# Patient Record
Sex: MALE | Race: WHITE | NOT HISPANIC OR LATINO
[De-identification: names, ages, dates, MRNs, and addresses within clinical notes are randomized per-mention and may not be internally consistent; named-entity substitution may affect disease eponyms.]

---

## 2018-11-19 ENCOUNTER — RESULT REVIEW (OUTPATIENT)
Age: 68
End: 2018-11-19

## 2020-09-29 PROBLEM — Z00.00 ENCOUNTER FOR PREVENTIVE HEALTH EXAMINATION: Status: ACTIVE | Noted: 2020-09-29

## 2020-09-30 ENCOUNTER — APPOINTMENT (OUTPATIENT)
Dept: DISASTER EMERGENCY | Facility: CLINIC | Age: 70
End: 2020-09-30

## 2020-09-30 DIAGNOSIS — Z01.818 ENCOUNTER FOR OTHER PREPROCEDURAL EXAMINATION: ICD-10-CM

## 2020-10-01 LAB — SARS-COV-2 N GENE NPH QL NAA+PROBE: NOT DETECTED

## 2020-10-05 ENCOUNTER — RESULT REVIEW (OUTPATIENT)
Age: 70
End: 2020-10-05

## 2021-06-28 ENCOUNTER — RESULT REVIEW (OUTPATIENT)
Age: 71
End: 2021-06-28

## 2021-07-06 ENCOUNTER — NON-APPOINTMENT (OUTPATIENT)
Age: 71
End: 2021-07-06

## 2021-07-06 ENCOUNTER — APPOINTMENT (OUTPATIENT)
Dept: OPHTHALMOLOGY | Facility: CLINIC | Age: 71
End: 2021-07-06
Payer: MEDICARE

## 2021-07-06 PROCEDURE — 92250 FUNDUS PHOTOGRAPHY W/I&R: CPT

## 2021-07-06 PROCEDURE — 92014 COMPRE OPH EXAM EST PT 1/>: CPT

## 2021-12-10 ENCOUNTER — TRANSCRIPTION ENCOUNTER (OUTPATIENT)
Age: 71
End: 2021-12-10

## 2022-01-10 ENCOUNTER — APPOINTMENT (OUTPATIENT)
Dept: OPHTHALMOLOGY | Facility: CLINIC | Age: 72
End: 2022-01-10

## 2022-01-19 ENCOUNTER — APPOINTMENT (OUTPATIENT)
Dept: OPHTHALMOLOGY | Facility: CLINIC | Age: 72
End: 2022-01-19

## 2022-01-25 ENCOUNTER — APPOINTMENT (OUTPATIENT)
Dept: OPHTHALMOLOGY | Facility: CLINIC | Age: 72
End: 2022-01-25
Payer: MEDICARE

## 2022-01-25 ENCOUNTER — NON-APPOINTMENT (OUTPATIENT)
Age: 72
End: 2022-01-25

## 2022-01-25 PROCEDURE — 92133 CPTRZD OPH DX IMG PST SGM ON: CPT

## 2022-01-25 PROCEDURE — 92014 COMPRE OPH EXAM EST PT 1/>: CPT

## 2022-04-08 ENCOUNTER — INPATIENT (INPATIENT)
Facility: HOSPITAL | Age: 72
LOS: 7 days | Discharge: HOME | DRG: 236 | End: 2022-04-16
Attending: THORACIC SURGERY (CARDIOTHORACIC VASCULAR SURGERY) | Admitting: THORACIC SURGERY (CARDIOTHORACIC VASCULAR SURGERY)
Payer: MEDICARE

## 2022-04-08 VITALS
HEART RATE: 71 BPM | OXYGEN SATURATION: 100 % | RESPIRATION RATE: 18 BRPM | TEMPERATURE: 98 F | DIASTOLIC BLOOD PRESSURE: 73 MMHG | SYSTOLIC BLOOD PRESSURE: 162 MMHG

## 2022-04-08 DIAGNOSIS — Z98.890 OTHER SPECIFIED POSTPROCEDURAL STATES: Chronic | ICD-10-CM

## 2022-04-08 DIAGNOSIS — Z90.49 ACQUIRED ABSENCE OF OTHER SPECIFIED PARTS OF DIGESTIVE TRACT: Chronic | ICD-10-CM

## 2022-04-08 DIAGNOSIS — Z29.9 ENCOUNTER FOR PROPHYLACTIC MEASURES, UNSPECIFIED: ICD-10-CM

## 2022-04-08 DIAGNOSIS — I25.10 ATHEROSCLEROTIC HEART DISEASE OF NATIVE CORONARY ARTERY WITHOUT ANGINA PECTORIS: ICD-10-CM

## 2022-04-08 DIAGNOSIS — E11.9 TYPE 2 DIABETES MELLITUS WITHOUT COMPLICATIONS: ICD-10-CM

## 2022-04-08 DIAGNOSIS — E78.5 HYPERLIPIDEMIA, UNSPECIFIED: ICD-10-CM

## 2022-04-08 DIAGNOSIS — E89.0 POSTPROCEDURAL HYPOTHYROIDISM: Chronic | ICD-10-CM

## 2022-04-08 DIAGNOSIS — Z98.84 BARIATRIC SURGERY STATUS: Chronic | ICD-10-CM

## 2022-04-08 DIAGNOSIS — I10 ESSENTIAL (PRIMARY) HYPERTENSION: ICD-10-CM

## 2022-04-08 DIAGNOSIS — F32.9 MAJOR DEPRESSIVE DISORDER, SINGLE EPISODE, UNSPECIFIED: ICD-10-CM

## 2022-04-08 DIAGNOSIS — E89.0 POSTPROCEDURAL HYPOTHYROIDISM: ICD-10-CM

## 2022-04-08 LAB — GLUCOSE BLDC GLUCOMTR-MCNC: 148 MG/DL — HIGH (ref 70–99)

## 2022-04-08 PROCEDURE — 71045 X-RAY EXAM CHEST 1 VIEW: CPT | Mod: 26

## 2022-04-08 PROCEDURE — 93010 ELECTROCARDIOGRAM REPORT: CPT

## 2022-04-08 PROCEDURE — 93880 EXTRACRANIAL BILAT STUDY: CPT | Mod: 26

## 2022-04-08 PROCEDURE — 93306 TTE W/DOPPLER COMPLETE: CPT | Mod: 26

## 2022-04-08 RX ORDER — LEVOTHYROXINE SODIUM 125 MCG
137 TABLET ORAL DAILY
Refills: 0 | Status: DISCONTINUED | OUTPATIENT
Start: 2022-04-08 | End: 2022-04-11

## 2022-04-08 RX ORDER — INSULIN LISPRO 100/ML
VIAL (ML) SUBCUTANEOUS
Refills: 0 | Status: DISCONTINUED | OUTPATIENT
Start: 2022-04-08 | End: 2022-04-11

## 2022-04-08 RX ORDER — METOPROLOL TARTRATE 50 MG
25 TABLET ORAL
Refills: 0 | Status: DISCONTINUED | OUTPATIENT
Start: 2022-04-08 | End: 2022-04-09

## 2022-04-08 RX ORDER — ENOXAPARIN SODIUM 100 MG/ML
40 INJECTION SUBCUTANEOUS EVERY 24 HOURS
Refills: 0 | Status: DISCONTINUED | OUTPATIENT
Start: 2022-04-08 | End: 2022-04-10

## 2022-04-08 RX ORDER — ISOSORBIDE MONONITRATE 60 MG/1
1 TABLET, EXTENDED RELEASE ORAL
Qty: 0 | Refills: 0 | DISCHARGE

## 2022-04-08 RX ORDER — DEXTROSE 50 % IN WATER 50 %
15 SYRINGE (ML) INTRAVENOUS ONCE
Refills: 0 | Status: DISCONTINUED | OUTPATIENT
Start: 2022-04-08 | End: 2022-04-11

## 2022-04-08 RX ORDER — ROSUVASTATIN CALCIUM 5 MG/1
1 TABLET ORAL
Qty: 0 | Refills: 0 | DISCHARGE

## 2022-04-08 RX ORDER — METOPROLOL TARTRATE 50 MG
25 TABLET ORAL ONCE
Refills: 0 | Status: COMPLETED | OUTPATIENT
Start: 2022-04-08 | End: 2022-04-08

## 2022-04-08 RX ORDER — DEXTROSE 50 % IN WATER 50 %
25 SYRINGE (ML) INTRAVENOUS ONCE
Refills: 0 | Status: DISCONTINUED | OUTPATIENT
Start: 2022-04-08 | End: 2022-04-11

## 2022-04-08 RX ORDER — ASPIRIN/CALCIUM CARB/MAGNESIUM 324 MG
81 TABLET ORAL DAILY
Refills: 0 | Status: DISCONTINUED | OUTPATIENT
Start: 2022-04-08 | End: 2022-04-10

## 2022-04-08 RX ORDER — TRAZODONE HCL 50 MG
100 TABLET ORAL AT BEDTIME
Refills: 0 | Status: DISCONTINUED | OUTPATIENT
Start: 2022-04-08 | End: 2022-04-11

## 2022-04-08 RX ORDER — PANTOPRAZOLE SODIUM 20 MG/1
40 TABLET, DELAYED RELEASE ORAL
Refills: 0 | Status: DISCONTINUED | OUTPATIENT
Start: 2022-04-08 | End: 2022-04-11

## 2022-04-08 RX ORDER — ATORVASTATIN CALCIUM 80 MG/1
40 TABLET, FILM COATED ORAL AT BEDTIME
Refills: 0 | Status: DISCONTINUED | OUTPATIENT
Start: 2022-04-08 | End: 2022-04-11

## 2022-04-08 RX ORDER — METFORMIN HYDROCHLORIDE 850 MG/1
2 TABLET ORAL
Qty: 0 | Refills: 0 | DISCHARGE

## 2022-04-08 RX ORDER — SUCRALFATE 1 G
1 TABLET ORAL
Refills: 0 | Status: DISCONTINUED | OUTPATIENT
Start: 2022-04-08 | End: 2022-04-11

## 2022-04-08 RX ORDER — GLUCAGON INJECTION, SOLUTION 0.5 MG/.1ML
1 INJECTION, SOLUTION SUBCUTANEOUS ONCE
Refills: 0 | Status: DISCONTINUED | OUTPATIENT
Start: 2022-04-08 | End: 2022-04-11

## 2022-04-08 RX ORDER — ESCITALOPRAM OXALATE 10 MG/1
20 TABLET, FILM COATED ORAL DAILY
Refills: 0 | Status: DISCONTINUED | OUTPATIENT
Start: 2022-04-08 | End: 2022-04-11

## 2022-04-08 RX ORDER — SODIUM CHLORIDE 9 MG/ML
3 INJECTION INTRAMUSCULAR; INTRAVENOUS; SUBCUTANEOUS EVERY 8 HOURS
Refills: 0 | Status: DISCONTINUED | OUTPATIENT
Start: 2022-04-08 | End: 2022-04-11

## 2022-04-08 RX ORDER — DEXTROSE 50 % IN WATER 50 %
12.5 SYRINGE (ML) INTRAVENOUS ONCE
Refills: 0 | Status: DISCONTINUED | OUTPATIENT
Start: 2022-04-08 | End: 2022-04-11

## 2022-04-08 RX ADMIN — Medication 100 MILLIGRAM(S): at 22:24

## 2022-04-08 RX ADMIN — SODIUM CHLORIDE 3 MILLILITER(S): 9 INJECTION INTRAMUSCULAR; INTRAVENOUS; SUBCUTANEOUS at 22:00

## 2022-04-08 RX ADMIN — Medication 25 MILLIGRAM(S): at 23:33

## 2022-04-08 RX ADMIN — ATORVASTATIN CALCIUM 40 MILLIGRAM(S): 80 TABLET, FILM COATED ORAL at 22:23

## 2022-04-08 NOTE — H&P ADULT - NSICDXPASTSURGICALHX_GEN_ALL_CORE_FT
PAST SURGICAL HISTORY:  H/O thyroidectomy     S/P cholecystectomy     S/P gastric bypass complicated by PUD    S/P hernia repair

## 2022-04-08 NOTE — H&P ADULT - PROBLEM SELECTOR PLAN 2
Transition to insulin sliding scale  Trend POC glucose to determine long acting insulin requirements  Will consult Endo in the morning   f/u A1C

## 2022-04-08 NOTE — H&P ADULT - NSICDXFAMILYHX_GEN_ALL_CORE_FT
FAMILY HISTORY:  Father  Still living? No  Family history of cancer of larynx, Age at diagnosis: Age Unknown  FHx: myocardial infarction, Age at diagnosis: Age Unknown

## 2022-04-08 NOTE — H&P ADULT - ASSESSMENT
72M with PMHx of MI s/p stents (1983), CAD s/p multiple balloon angioplasties up to a few years ago, DM II,  HLD, HTN, thyroid cancer s/p thyroidectomy, cholecystitis s/p sepsis requiring cholecystectomy, PUD s/p gastric bypass, spinal stenosis, depression presented to the [place] ED 4/8/22 c/o of chest pain, nausea, and regurgitation. Pt underwent cardiac catheterization at [place] showing multivessel disease and was transferred to [facility] for CABG evaluation.

## 2022-04-08 NOTE — H&P ADULT - NSHPLABSRESULTS_GEN_ALL_CORE
Coronary Angiography - 4/8/22 [place]  The coronary circulation is co-dominant.    LM  Left main artery: The segment is visually normal in size and structure.     LAD  Mid left anterior descending: There is a 60% stenosis in the middle third portion of the segment. The previously placed stent is an unknown type stent and is partially occluded. There is an in-stent restenosis. There is no in-stent thrombosis.   Distal left anterior descending: There is a 90% stenosis in the distal third portion of the segment.  Second diagonal: There is an 85% stenosis in the proximal third portion of the segment.    CX  Proximal circumflex: There is a 90% stenosis. The previously placed stent is an unknown type stent and is partially occluded. There is in-stent restenosis. There is no in-stent thrombosis.  Distal circumflex: There is a 90% stenosis.   First obtuse marginal: There is an 80% stenosis.    RCA  Proximal right coronary artery: There is a 95% stenosis in the proximal third portion of the segment. The previously placed stent is an unknown type stent and is partially occluded. There is in-stent restenosis. There is no in-stent thrombosis.  Mid right coronary artery: There is a 90% stenosis in the middle third portion of the segment.     Left Heart Cath  Left ventricular function was assessed. Global left ventricular function is normal. Ejection fraction was visually estimated by LV Gram with a value of 65%. LV to AO pullback was performed and there is no pressure gradient. The left ventricular end diastolic pressure was 6 mmHg.

## 2022-04-08 NOTE — H&P ADULT - NSHPPHYSICALEXAM_GEN_ALL_CORE
T(C): 36.8 (04-08-22 @ 17:35), Max: 36.8 (04-08-22 @ 17:35)  HR: 71 (04-08-22 @ 17:35) (71 - 71)  BP: 162/73 (04-08-22 @ 17:35) (162/73 - 162/73)  RR: 18 (04-08-22 @ 17:35) (18 - 18)  SpO2: 100% (04-08-22 @ 17:35) (100% - 100%)    CONSTITUTIONAL: Well groomed, no apparent distress    EYES: PERRL and symmetric, EOMI, No conjunctival or scleral injection, non-icteric    ENMT: Oral mucosa with moist membranes. No external nasal lesions; nasal mucosa not inflamed; normal dentition; no pharyngeal injection or exudates.   	NECK: Supple, symmetric and without tracheal deviation; thyroid gland not enlarged and without palpable masses    RESPIRATORY: No respiratory distress, no use of accessory muscles; CTA b/l, no wheezes, rales or rhonchi.     CARDIOVASCULAR: RRRR, +S1S2, no murmurs, no rubs, no gallops; no JVD; no peripheral edema  	Vascular: no carotid bruits; radial pulse palpable, dorsalis pedis pulse palpable, posterior tibialis pulse palpable    GASTROINTESTINAL: +Bowel sounds. Soft, non tender, non distended, no rebound, no guarding; No palpable masses    MUSCULOSKELETAL: no digital clubbing or cyanosis; normal muscle strength/tone    SKIN: No rashes or ulcers noted; no subcutaneous nodules or induration palpable    NEUROLOGIC: sensation intact in upper and lower extremities     PSYCHIATRIC: Appropriate insight/judgment; A+O x 3, mood and affect appropriate, recent/remote memory intact

## 2022-04-08 NOTE — H&P ADULT - NSHPSOCIALHISTORY_GEN_ALL_CORE
Patient lives with wife in house in [place].  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  Pt has 20 year smoking history 2PPD, quit in 1983.  Drinks 3-4 drinks every other day.  Denies illicit drug use.

## 2022-04-08 NOTE — H&P ADULT - NSICDXPASTMEDICALHX_GEN_ALL_CORE_FT
PAST MEDICAL HISTORY:  CAD (coronary artery disease)     Depression, major     H/O cholecystitis     HLD (hyperlipidemia)     HTN (hypertension)     Myocardial infarction     OA (osteoarthritis) of knee     Spinal stenosis     Thyroid cancer     Type 2 diabetes mellitus

## 2022-04-08 NOTE — H&P ADULT - PROBLEM SELECTOR PLAN 1
S/P Cath with multivessel disease with in-stent stenosis   Preoperative workup for CABG ordered  Continue ASA, Lopressor, Lipitor  Surgery timing TBD, d/w Dr. Stewart

## 2022-04-08 NOTE — H&P ADULT - HISTORY OF PRESENT ILLNESS
72M with PMHx of CAD s/p multiple stents and balloon angioplasties, MI (1983), DM II,  HLD, HTN, thyroid cancer s/p thyroidectomy, cholecystitis s/p sepsis requiring cholecystectomy, PUD s/p gastric bypass, spinal stenosis, depression was transferred from [place] for a CABG evaluation.  Presented to the [place] ED this morning 4/8/22 c/o of chest pain, nausea, and regurgitation. Pt states that chest pain began last night with no relief from 2 tabs of NTG or tums. Also admits to weakness associated with symptoms last night. Pt endorsed fatigue for several months, saying that he becomes tired by 3pm with no attributable cause.  Pt underwent cardiac catheterization, showing multivessel disease. Denies radiation of pain, HA, SOB.

## 2022-04-09 LAB
A1C WITH ESTIMATED AVERAGE GLUCOSE RESULT: 7.5 % — HIGH (ref 4–5.6)
ALBUMIN SERPL ELPH-MCNC: 3.6 G/DL — SIGNIFICANT CHANGE UP (ref 3.3–5.2)
ALP SERPL-CCNC: 49 U/L — SIGNIFICANT CHANGE UP (ref 40–120)
ALT FLD-CCNC: 26 U/L — SIGNIFICANT CHANGE UP
ANION GAP SERPL CALC-SCNC: 13 MMOL/L — SIGNIFICANT CHANGE UP (ref 5–17)
APPEARANCE UR: CLEAR — SIGNIFICANT CHANGE UP
APTT BLD: 25.7 SEC — LOW (ref 27.5–35.5)
AST SERPL-CCNC: 13 U/L — SIGNIFICANT CHANGE UP
BACTERIA # UR AUTO: ABNORMAL
BILIRUB DIRECT SERPL-MCNC: 0.1 MG/DL — SIGNIFICANT CHANGE UP (ref 0–0.3)
BILIRUB INDIRECT FLD-MCNC: 0.3 MG/DL — SIGNIFICANT CHANGE UP (ref 0.2–1)
BILIRUB SERPL-MCNC: 0.4 MG/DL — SIGNIFICANT CHANGE UP (ref 0.4–2)
BILIRUB UR-MCNC: NEGATIVE — SIGNIFICANT CHANGE UP
BLD GP AB SCN SERPL QL: SIGNIFICANT CHANGE UP
BUN SERPL-MCNC: 25.9 MG/DL — HIGH (ref 8–20)
CALCIUM SERPL-MCNC: 9.1 MG/DL — SIGNIFICANT CHANGE UP (ref 8.6–10.2)
CHLORIDE SERPL-SCNC: 97 MMOL/L — LOW (ref 98–107)
CK SERPL-CCNC: 41 U/L — SIGNIFICANT CHANGE UP (ref 30–200)
CO2 SERPL-SCNC: 24 MMOL/L — SIGNIFICANT CHANGE UP (ref 22–29)
COLOR SPEC: YELLOW — SIGNIFICANT CHANGE UP
CREAT SERPL-MCNC: 0.66 MG/DL — SIGNIFICANT CHANGE UP (ref 0.5–1.3)
DIFF PNL FLD: NEGATIVE — SIGNIFICANT CHANGE UP
EGFR: 100 ML/MIN/1.73M2 — SIGNIFICANT CHANGE UP
EPI CELLS # UR: SIGNIFICANT CHANGE UP
ESTIMATED AVERAGE GLUCOSE: 169 MG/DL — HIGH (ref 68–114)
GLUCOSE BLDC GLUCOMTR-MCNC: 120 MG/DL — HIGH (ref 70–99)
GLUCOSE BLDC GLUCOMTR-MCNC: 129 MG/DL — HIGH (ref 70–99)
GLUCOSE BLDC GLUCOMTR-MCNC: 164 MG/DL — HIGH (ref 70–99)
GLUCOSE BLDC GLUCOMTR-MCNC: 203 MG/DL — HIGH (ref 70–99)
GLUCOSE SERPL-MCNC: 118 MG/DL — HIGH (ref 70–99)
GLUCOSE UR QL: 1000 MG/DL
HCT VFR BLD CALC: 37.3 % — LOW (ref 39–50)
HCV AB S/CO SERPL IA: 0.06 S/CO — SIGNIFICANT CHANGE UP (ref 0–0.99)
HCV AB SERPL-IMP: SIGNIFICANT CHANGE UP
HGB BLD-MCNC: 12 G/DL — LOW (ref 13–17)
INR BLD: 0.97 RATIO — SIGNIFICANT CHANGE UP (ref 0.88–1.16)
KETONES UR-MCNC: NEGATIVE — SIGNIFICANT CHANGE UP
LEUKOCYTE ESTERASE UR-ACNC: NEGATIVE — SIGNIFICANT CHANGE UP
MAGNESIUM SERPL-MCNC: 2.2 MG/DL — SIGNIFICANT CHANGE UP (ref 1.6–2.6)
MCHC RBC-ENTMCNC: 26.1 PG — LOW (ref 27–34)
MCHC RBC-ENTMCNC: 32.2 GM/DL — SIGNIFICANT CHANGE UP (ref 32–36)
MCV RBC AUTO: 81.3 FL — SIGNIFICANT CHANGE UP (ref 80–100)
MRSA PCR RESULT.: SIGNIFICANT CHANGE UP
NITRITE UR-MCNC: NEGATIVE — SIGNIFICANT CHANGE UP
NT-PROBNP SERPL-SCNC: 964 PG/ML — HIGH (ref 0–300)
PA ADP PRP-ACNC: 277 PRU — SIGNIFICANT CHANGE UP (ref 180–376)
PH UR: 6 — SIGNIFICANT CHANGE UP (ref 5–8)
PHOSPHATE SERPL-MCNC: 3.5 MG/DL — SIGNIFICANT CHANGE UP (ref 2.4–4.7)
PLATELET # BLD AUTO: 150 K/UL — SIGNIFICANT CHANGE UP (ref 150–400)
POTASSIUM SERPL-MCNC: 4 MMOL/L — SIGNIFICANT CHANGE UP (ref 3.5–5.3)
POTASSIUM SERPL-SCNC: 4 MMOL/L — SIGNIFICANT CHANGE UP (ref 3.5–5.3)
PREALB SERPL-MCNC: 27 MG/DL — SIGNIFICANT CHANGE UP (ref 18–38)
PROT SERPL-MCNC: 5.6 G/DL — LOW (ref 6.6–8.7)
PROT UR-MCNC: 15
PROTHROM AB SERPL-ACNC: 11.3 SEC — SIGNIFICANT CHANGE UP (ref 10.5–13.4)
RBC # BLD: 4.59 M/UL — SIGNIFICANT CHANGE UP (ref 4.2–5.8)
RBC # FLD: 19.4 % — HIGH (ref 10.3–14.5)
RBC CASTS # UR COMP ASSIST: NEGATIVE /HPF — SIGNIFICANT CHANGE UP (ref 0–4)
S AUREUS DNA NOSE QL NAA+PROBE: SIGNIFICANT CHANGE UP
SODIUM SERPL-SCNC: 134 MMOL/L — LOW (ref 135–145)
SP GR SPEC: 1.01 — SIGNIFICANT CHANGE UP (ref 1.01–1.02)
T3 SERPL-MCNC: 59 NG/DL — LOW (ref 80–200)
T4 AB SER-ACNC: 7.6 UG/DL — SIGNIFICANT CHANGE UP (ref 4.5–12)
T4 FREE SERPL-MCNC: 1.7 NG/DL — SIGNIFICANT CHANGE UP (ref 0.9–1.8)
TROPONIN T SERPL-MCNC: 0.05 NG/ML — SIGNIFICANT CHANGE UP (ref 0–0.06)
TSH SERPL-MCNC: <0.1 UIU/ML — LOW (ref 0.27–4.2)
UROBILINOGEN FLD QL: NEGATIVE MG/DL — SIGNIFICANT CHANGE UP
WBC # BLD: 9.89 K/UL — SIGNIFICANT CHANGE UP (ref 3.8–10.5)
WBC # FLD AUTO: 9.89 K/UL — SIGNIFICANT CHANGE UP (ref 3.8–10.5)
WBC UR QL: SIGNIFICANT CHANGE UP /HPF (ref 0–5)

## 2022-04-09 PROCEDURE — 71045 X-RAY EXAM CHEST 1 VIEW: CPT | Mod: 26

## 2022-04-09 PROCEDURE — 71250 CT THORAX DX C-: CPT | Mod: 26

## 2022-04-09 PROCEDURE — 99222 1ST HOSP IP/OBS MODERATE 55: CPT

## 2022-04-09 PROCEDURE — 99232 SBSQ HOSP IP/OBS MODERATE 35: CPT

## 2022-04-09 RX ORDER — LOSARTAN POTASSIUM 100 MG/1
100 TABLET, FILM COATED ORAL DAILY
Refills: 0 | Status: DISCONTINUED | OUTPATIENT
Start: 2022-04-09 | End: 2022-04-10

## 2022-04-09 RX ORDER — METOPROLOL TARTRATE 50 MG
25 TABLET ORAL ONCE
Refills: 0 | Status: COMPLETED | OUTPATIENT
Start: 2022-04-09 | End: 2022-04-09

## 2022-04-09 RX ORDER — METOPROLOL TARTRATE 50 MG
50 TABLET ORAL
Refills: 0 | Status: DISCONTINUED | OUTPATIENT
Start: 2022-04-09 | End: 2022-04-11

## 2022-04-09 RX ADMIN — Medication 81 MILLIGRAM(S): at 09:32

## 2022-04-09 RX ADMIN — SODIUM CHLORIDE 3 MILLILITER(S): 9 INJECTION INTRAMUSCULAR; INTRAVENOUS; SUBCUTANEOUS at 06:40

## 2022-04-09 RX ADMIN — ATORVASTATIN CALCIUM 40 MILLIGRAM(S): 80 TABLET, FILM COATED ORAL at 21:12

## 2022-04-09 RX ADMIN — ESCITALOPRAM OXALATE 20 MILLIGRAM(S): 10 TABLET, FILM COATED ORAL at 12:09

## 2022-04-09 RX ADMIN — Medication 4: at 16:29

## 2022-04-09 RX ADMIN — Medication 1 GRAM(S): at 16:32

## 2022-04-09 RX ADMIN — LOSARTAN POTASSIUM 100 MILLIGRAM(S): 100 TABLET, FILM COATED ORAL at 09:33

## 2022-04-09 RX ADMIN — Medication 1 GRAM(S): at 09:33

## 2022-04-09 RX ADMIN — Medication 25 MILLIGRAM(S): at 09:32

## 2022-04-09 RX ADMIN — ENOXAPARIN SODIUM 40 MILLIGRAM(S): 100 INJECTION SUBCUTANEOUS at 21:13

## 2022-04-09 RX ADMIN — Medication 25 MILLIGRAM(S): at 06:45

## 2022-04-09 RX ADMIN — SODIUM CHLORIDE 3 MILLILITER(S): 9 INJECTION INTRAMUSCULAR; INTRAVENOUS; SUBCUTANEOUS at 12:08

## 2022-04-09 RX ADMIN — Medication 2: at 21:19

## 2022-04-09 RX ADMIN — Medication 50 MILLIGRAM(S): at 16:30

## 2022-04-09 RX ADMIN — Medication 100 MILLIGRAM(S): at 21:12

## 2022-04-09 RX ADMIN — Medication 1 GRAM(S): at 12:09

## 2022-04-09 RX ADMIN — SODIUM CHLORIDE 3 MILLILITER(S): 9 INJECTION INTRAMUSCULAR; INTRAVENOUS; SUBCUTANEOUS at 21:22

## 2022-04-09 RX ADMIN — Medication 137 MICROGRAM(S): at 06:45

## 2022-04-09 RX ADMIN — PANTOPRAZOLE SODIUM 40 MILLIGRAM(S): 20 TABLET, DELAYED RELEASE ORAL at 06:45

## 2022-04-09 NOTE — PROGRESS NOTE ADULT - ASSESSMENT
72M with PMHx of MI s/p stents (1983), CAD s/p multiple balloon angioplasties up to a few years ago, DM II,  HLD, HTN, thyroid cancer s/p thyroidectomy, cholecystitis s/p sepsis requiring cholecystectomy, PUD s/p gastric bypass, spinal stenosis, depression presented to the [place] ED 4/8/22 c/o of chest pain, nausea, and regurgitation. Pt underwent cardiac catheterization at [place] showing multivessel disease and was transferred to [facility] for CABG evaluation.

## 2022-04-09 NOTE — CONSULT NOTE ADULT - ASSESSMENT
72M with PMHx of MI s/p stents (1983), CAD s/p multiple balloon angioplasties up to a few years ago, DM II,  HLD, HTN, thyroid cancer s/p thyroidectomy, cholecystitis s/p sepsis requiring cholecystectomy, PUD s/p gastric bypass, spinal stenosis, depression presented to the [place] ED 4/8/22 c/o of chest pain, nausea, and regurgitation. Pt underwent cardiac catheterization at [place] showing multivessel disease and was transferred to [facility] for CABG evaluation.     1. T2DM complicated by CAD - A1c 7.5%, glucoses controlled in hospital  - cont Admelog scale, can start Lantus if glucoses worsening    2. Papillary thyroid cancer s/p Total thyroidectomy in 2018 with reported recurrence in upper chest area which he thinks is growing. TSH <0.01 and is at goal given thyroid cancer with mets to chest  - cont LT4 137 mcg daily  - chest CT, prior to CABG  - tried calling Dr Barber, to get more info on the chest recurrence, but office closed on weekends  - check thyroglobulin and Tg ab  - check free T4 levels    3. CAD (coronary artery disease) p/p Cath with multivessel disease and in-stent stenosis  - workup for CABG per CTS  - continue ASA, Lipitor, and Lopressor

## 2022-04-09 NOTE — PROGRESS NOTE ADULT - SUBJECTIVE AND OBJECTIVE BOX
Preop Evaluation for CABG for Multivessel CAD    Patient is a 72y old  Male who presents with a chief complaint of Transfer from [place] for CABG evaluation (08 Apr 2022 17:47)    HPI:  72M with PMHx of CAD s/p multiple stents and balloon angioplasties, MI (1983), DM II,  HLD, HTN, thyroid cancer s/p thyroidectomy, cholecystitis s/p sepsis requiring cholecystectomy, PUD s/p gastric bypass, spinal stenosis, depression was transferred from [place] for a CABG evaluation.  Presented to the [place] ED this morning 4/8/22 c/o of chest pain, nausea, and regurgitation. Pt states that chest pain began last night with no relief from 2 tabs of NTG or tums. Also admits to weakness associated with symptoms last night. Pt endorsed fatigue for several months, saying that he becomes tired by 3pm with no attributable cause.  Pt underwent cardiac catheterization, showing multivessel disease. Denies radiation of pain, HA, SOB.  (08 Apr 2022 17:47)    PAST MEDICAL & SURGICAL HISTORY:  Type 2 diabetes mellitus  HTN (hypertension)  HLD (hyperlipidemia)  Thyroid cancer  CAD (coronary artery disease)  Myocardial infarction  Spinal stenosis  OA (osteoarthritis) of knee  Depression, major  H/O cholecystitis  S/P gastric bypass, complicated by PUD  H/O thyroidectomy  S/P cholecystectomy  S/P hernia repair    FAMILY HISTORY:  FHx: myocardial infarction (Father)  Family history of cancer of larynx (Father)    Significant recent/past 24 hr events: No overnight events reported.    Subjective: Patient lying in bed in NAD. +Tolerating diet. +Passing gas. No pain elicited at this time. Denies fevers, chills, lightheadedness, dizziness, HA, CP, palpitations, SOB, cough, abdominal pain, N/V, diarrhea, numbness/tingling in extremities, or any other acute complaints.  ROS negative x 10 systems except as noted above.    MEDICATIONS  (STANDING):  aspirin enteric coated 81 milliGRAM(s) Oral daily  atorvastatin 40 milliGRAM(s) Oral at bedtime  enoxaparin Injectable 40 milliGRAM(s) SubCutaneous every 24 hours  escitalopram 20 milliGRAM(s) Oral daily  insulin lispro (ADMELOG) corrective regimen sliding scale   SubCutaneous Before meals and at bedtime  levothyroxine 137 MICROGram(s) Oral daily  metoprolol tartrate 25 milliGRAM(s) Oral two times a day  pantoprazole    Tablet 40 milliGRAM(s) Oral before breakfast  sodium chloride 0.9% lock flush 3 milliLiter(s) IV Push every 8 hours  sucralfate 1 Gram(s) Oral four times a day  traZODone 100 milliGRAM(s) Oral at bedtime    Allergies:  ACE inhibitors (Other)    Vitals   T(C): 36.8 (08 Apr 2022 17:35), Max: 36.8 (08 Apr 2022 17:35)  T(F): 98.3 (08 Apr 2022 17:35), Max: 98.3 (08 Apr 2022 17:35)  HR: 61 (09 Apr 2022 00:27) (60 - 71)  BP: 135/64 (09 Apr 2022 00:27) (135/64 - 171/79)  RR: 18 (08 Apr 2022 23:27) (18 - 18)  SpO2: 98% (08 Apr 2022 23:27) (92% - 100%)    Physical Exam  Neuro: A+O x 3, non-focal, speech clear and intact  HEENT:  NCAT, No conjuctival edema or icterus, no thrush.    Neck:  Supple, trachea midline  Pulm: CTA, good air entry, equal bilaterally, no rales/rhonchi/wheezing, no accessory muscle use noted  CV: regular rate, regular rhythm, +S1S2, no murmur or rub noted  Abd: soft, NT, ND, + BS  Ext: MARIANO x 4, no edema, no cyanosis, distal motor/neuro/circ intact  Skin: warm, dry, perfused      POCT Blood Glucose.: 148 mg/dL (04-08-22 @ 22:28)    Labs: Pending  CXR: Pending read.   Carotid US: Pending read.   TTE: Ordered  PFTs: Performed  UA: Pending

## 2022-04-09 NOTE — CONSULT NOTE ADULT - SUBJECTIVE AND OBJECTIVE BOX
HPI:  72M with PMHx of CAD s/p multiple stents and balloon angioplasties, MI (1983), DM II,  HLD, HTN, thyroid cancer s/p thyroidectomy, cholecystitis s/p sepsis requiring cholecystectomy, PUD s/p gastric bypass, spinal stenosis, depression was transferred from [place] for a CABG evaluation.  Presented to the [place] ED this morning 4/8/22 c/o of chest pain, nausea, and regurgitation. Pt states that chest pain began last night with no relief from 2 tabs of NTG or tums. Also admits to weakness associated with symptoms last night. Pt endorsed fatigue for several months, saying that he becomes tired by 3pm with no attributable cause.  Pt underwent cardiac catheterization, showing multivessel disease. Denies radiation of pain, HA, SOB.  (08 Apr 2022 17:47)    Endocrine consulted for thyroid and diabetes management  In 2018 he was dx with papillary thyroid cancer and underwent total thyroidectomy at [facility]. He did not receive COLVIN ablation but has been maintained on thyroid hormone suppressive therapy. He follows with Dr Barber, at [facility] and the [facility] in [facility], who has been monitoring pt with serial sonogram and Thyrogen WBS.  Patient does report that he has recurrence in the chest area which he thinks is increasing in size      ALso with T2DM x15 years, persisted despite bariatric surgery.  He reports good control with outpt A1c consistently around 7%.  He does not test his sugars.  He is currently taking Victoza 1.8 mg daily, MFN 1000 mg BID and Jardiance 10 mg daily.    ROS - denies headache/dizziness. denies blurry vision. denies CP/palp. denies SOB. denies abd pain/N. denies polydipsia. denies polyuria/nocturia.  Remaining ROS negative.    PAST MEDICAL & SURGICAL HISTORY:  Type 2 diabetes mellitus  HTN (hypertension)  HLD (hyperlipidemia)  Thyroid cancer  CAD (coronary artery disease)  Myocardial infarction  Spinal stenosis  OA (osteoarthritis) of knee  Depression, major  H/O cholecystitis  S/P gastric bypass complicated by PUD  H/O thyroidectomy  S/P cholecystectomy  S/P hernia repair    FAMILY HISTORY:  FHx: myocardial infarction (Father)  Family history of cancer of larynx (Father)    SOCIAL HISTORY: denies tobacco, illicit drugs or EtOh use    MEDICATIONS  (STANDING):  aspirin enteric coated 81 milliGRAM(s) Oral daily  atorvastatin 40 milliGRAM(s) Oral at bedtime  dextrose 50% Injectable 25 Gram(s) IV Push once  dextrose 50% Injectable 12.5 Gram(s) IV Push once  dextrose 50% Injectable 25 Gram(s) IV Push once  dextrose Oral Gel 15 Gram(s) Oral once  enoxaparin Injectable 40 milliGRAM(s) SubCutaneous every 24 hours  escitalopram 20 milliGRAM(s) Oral daily  glucagon  Injectable 1 milliGRAM(s) IntraMuscular once  insulin lispro (ADMELOG) corrective regimen sliding scale   SubCutaneous Before meals and at bedtime  levothyroxine 137 MICROGram(s) Oral daily  losartan 100 milliGRAM(s) Oral daily  metoprolol tartrate 50 milliGRAM(s) Oral two times a day  pantoprazole    Tablet 40 milliGRAM(s) Oral before breakfast  sodium chloride 0.9% lock flush 3 milliLiter(s) IV Push every 8 hours  sucralfate 1 Gram(s) Oral four times a day  traZODone 100 milliGRAM(s) Oral at bedtime    MEDICATIONS  (PRN):    ALLERGIES: No Known Allergies    Vital Signs Last 24 Hrs  T(C): 36.8 (09 Apr 2022 16:23), Max: 36.8 (08 Apr 2022 17:35)  T(F): 98.3 (09 Apr 2022 16:23), Max: 98.3 (08 Apr 2022 17:35)  HR: 74 (09 Apr 2022 16:23) (60 - 74)  BP: 137/88 (09 Apr 2022 16:23) (135/64 - 173/85)  BP(mean): --  RR: 18 (09 Apr 2022 16:23) (18 - 18)  SpO2: 97% (09 Apr 2022 16:23) (92% - 100%)    Physical Exam:  General appearance: NAD, resting comfortable  Eyes:  EOMI  Neck: (+) surgical scar, no lymphadenopathy  Lungs: Normal respiratory excursion. Lungs clear no w/r/r  CV: Normal S1S2, regular. No m/r/g.  Pedal pulses intact.  Abdomen: Soft, nontender, nondistended, (+) BS  Musculoskeletal: No cyanosis, clubbing, or edema.  Skin: Warm and moist.  Feet - no ulcers  Neuro: Cranial nerves intact. Good sensation to light touch.  Psych: Normal affect, good judgement/insight      LABS:                        12.0   9.89  )-----------( 150      ( 09 Apr 2022 02:58 )             37.3     04-09    134<L>  |  97<L>  |  25.9<H>  ----------------------------<  118<H>  4.0   |  24.0  |  0.66    Ca    9.1      09 Apr 2022 02:58  Phos  3.5     04-09  Mg     2.2     04-09    TPro  5.6<L>  /  Alb  3.6  /  TBili  0.4  /  DBili  0.1  /  AST  13  /  ALT  26  /  AlkPhos  49  04-09    LIVER FUNCTIONS - ( 09 Apr 2022 02:58 )  Alb: 3.6 g/dL / Pro: 5.6 g/dL / ALK PHOS: 49 U/L / ALT: 26 U/L / AST: 13 U/L / GGT: x           A1C with Estimated Average Glucose Result: 7.5 % (04-09-22 @ 02:58)    CAPILLARY BLOOD GLUCOSE  POCT Blood Glucose.: 129 mg/dL (09 Apr 2022 12:12)  POCT Blood Glucose.: 120 mg/dL (09 Apr 2022 08:30)  POCT Blood Glucose.: 148 mg/dL (08 Apr 2022 22:28)      T4, Serum: 7.6 ug/dL [4.5 - 12.0] (04-09-22)  Triiodothyronine, Total (T3 Total): 59 ng/dL [80 - 200] (04-09-22)  Thyroid Stimulating Hormone, Serum: <0.10 uIU/mL [0.27 - 4.20] (04-09-22)

## 2022-04-10 LAB
ABO RH CONFIRMATION: SIGNIFICANT CHANGE UP
ANION GAP SERPL CALC-SCNC: 13 MMOL/L — SIGNIFICANT CHANGE UP (ref 5–17)
BILIRUB SERPL-MCNC: 0.4 MG/DL — SIGNIFICANT CHANGE UP (ref 0.4–2)
BUN SERPL-MCNC: 25.3 MG/DL — HIGH (ref 8–20)
CALCIUM SERPL-MCNC: 8.9 MG/DL — SIGNIFICANT CHANGE UP (ref 8.6–10.2)
CHLORIDE SERPL-SCNC: 102 MMOL/L — SIGNIFICANT CHANGE UP (ref 98–107)
CO2 SERPL-SCNC: 24 MMOL/L — SIGNIFICANT CHANGE UP (ref 22–29)
CREAT SERPL-MCNC: 0.67 MG/DL — SIGNIFICANT CHANGE UP (ref 0.5–1.3)
EGFR: 99 ML/MIN/1.73M2 — SIGNIFICANT CHANGE UP
GLUCOSE BLDC GLUCOMTR-MCNC: 125 MG/DL — HIGH (ref 70–99)
GLUCOSE BLDC GLUCOMTR-MCNC: 164 MG/DL — HIGH (ref 70–99)
GLUCOSE BLDC GLUCOMTR-MCNC: 167 MG/DL — HIGH (ref 70–99)
GLUCOSE BLDC GLUCOMTR-MCNC: 227 MG/DL — HIGH (ref 70–99)
GLUCOSE BLDC GLUCOMTR-MCNC: 265 MG/DL — HIGH (ref 70–99)
GLUCOSE SERPL-MCNC: 160 MG/DL — HIGH (ref 70–99)
HCT VFR BLD CALC: 38.3 % — LOW (ref 39–50)
HGB BLD-MCNC: 12.3 G/DL — LOW (ref 13–17)
INR BLD: 1.03 RATIO — SIGNIFICANT CHANGE UP (ref 0.88–1.16)
MAGNESIUM SERPL-MCNC: 2.2 MG/DL — SIGNIFICANT CHANGE UP (ref 1.6–2.6)
MCHC RBC-ENTMCNC: 26.1 PG — LOW (ref 27–34)
MCHC RBC-ENTMCNC: 32.1 GM/DL — SIGNIFICANT CHANGE UP (ref 32–36)
MCV RBC AUTO: 81.1 FL — SIGNIFICANT CHANGE UP (ref 80–100)
MELD SCORE WITH DIALYSIS: 20 POINTS — SIGNIFICANT CHANGE UP
MELD SCORE WITHOUT DIALYSIS: 7 POINTS — SIGNIFICANT CHANGE UP
PLATELET # BLD AUTO: 132 K/UL — LOW (ref 150–400)
POTASSIUM SERPL-MCNC: 4 MMOL/L — SIGNIFICANT CHANGE UP (ref 3.5–5.3)
POTASSIUM SERPL-SCNC: 4 MMOL/L — SIGNIFICANT CHANGE UP (ref 3.5–5.3)
PROTHROM AB SERPL-ACNC: 12 SEC — SIGNIFICANT CHANGE UP (ref 10.5–13.4)
RBC # BLD: 4.72 M/UL — SIGNIFICANT CHANGE UP (ref 4.2–5.8)
RBC # FLD: 19.8 % — HIGH (ref 10.3–14.5)
SARS-COV-2 RNA SPEC QL NAA+PROBE: SIGNIFICANT CHANGE UP
SODIUM SERPL-SCNC: 139 MMOL/L — SIGNIFICANT CHANGE UP (ref 135–145)
WBC # BLD: 8.61 K/UL — SIGNIFICANT CHANGE UP (ref 3.8–10.5)
WBC # FLD AUTO: 8.61 K/UL — SIGNIFICANT CHANGE UP (ref 3.8–10.5)

## 2022-04-10 PROCEDURE — 99231 SBSQ HOSP IP/OBS SF/LOW 25: CPT | Mod: 25

## 2022-04-10 RX ORDER — VANCOMYCIN HCL 1 G
1000 VIAL (EA) INTRAVENOUS ONCE
Refills: 0 | Status: COMPLETED | OUTPATIENT
Start: 2022-04-10 | End: 2022-04-10

## 2022-04-10 RX ORDER — CHLORHEXIDINE GLUCONATE 213 G/1000ML
15 SOLUTION TOPICAL
Refills: 0 | Status: DISCONTINUED | OUTPATIENT
Start: 2022-04-10 | End: 2022-04-11

## 2022-04-10 RX ORDER — CHLORHEXIDINE GLUCONATE 213 G/1000ML
1 SOLUTION TOPICAL
Refills: 0 | Status: DISCONTINUED | OUTPATIENT
Start: 2022-04-10 | End: 2022-04-11

## 2022-04-10 RX ORDER — SORBITOL SOLUTION 70 %
30 SOLUTION, ORAL MISCELLANEOUS ONCE
Refills: 0 | Status: COMPLETED | OUTPATIENT
Start: 2022-04-10 | End: 2022-04-10

## 2022-04-10 RX ORDER — CEFUROXIME AXETIL 250 MG
1500 TABLET ORAL ONCE
Refills: 0 | Status: COMPLETED | OUTPATIENT
Start: 2022-04-10 | End: 2022-04-10

## 2022-04-10 RX ORDER — POLYETHYLENE GLYCOL 3350 17 G/17G
17 POWDER, FOR SOLUTION ORAL DAILY
Refills: 0 | Status: DISCONTINUED | OUTPATIENT
Start: 2022-04-10 | End: 2022-04-11

## 2022-04-10 RX ADMIN — Medication 30 MILLILITER(S): at 16:30

## 2022-04-10 RX ADMIN — Medication 6: at 09:51

## 2022-04-10 RX ADMIN — CHLORHEXIDINE GLUCONATE 1 APPLICATION(S): 213 SOLUTION TOPICAL at 18:43

## 2022-04-10 RX ADMIN — CHLORHEXIDINE GLUCONATE 15 MILLILITER(S): 213 SOLUTION TOPICAL at 18:42

## 2022-04-10 RX ADMIN — PANTOPRAZOLE SODIUM 40 MILLIGRAM(S): 20 TABLET, DELAYED RELEASE ORAL at 06:37

## 2022-04-10 RX ADMIN — Medication 1 GRAM(S): at 06:37

## 2022-04-10 RX ADMIN — ENOXAPARIN SODIUM 40 MILLIGRAM(S): 100 INJECTION SUBCUTANEOUS at 21:50

## 2022-04-10 RX ADMIN — SODIUM CHLORIDE 3 MILLILITER(S): 9 INJECTION INTRAMUSCULAR; INTRAVENOUS; SUBCUTANEOUS at 06:30

## 2022-04-10 RX ADMIN — Medication 137 MICROGRAM(S): at 06:36

## 2022-04-10 RX ADMIN — LOSARTAN POTASSIUM 100 MILLIGRAM(S): 100 TABLET, FILM COATED ORAL at 06:36

## 2022-04-10 RX ADMIN — Medication 4: at 18:37

## 2022-04-10 RX ADMIN — SODIUM CHLORIDE 3 MILLILITER(S): 9 INJECTION INTRAMUSCULAR; INTRAVENOUS; SUBCUTANEOUS at 13:40

## 2022-04-10 RX ADMIN — POLYETHYLENE GLYCOL 3350 17 GRAM(S): 17 POWDER, FOR SOLUTION ORAL at 09:51

## 2022-04-10 RX ADMIN — Medication 2: at 21:58

## 2022-04-10 RX ADMIN — SODIUM CHLORIDE 3 MILLILITER(S): 9 INJECTION INTRAMUSCULAR; INTRAVENOUS; SUBCUTANEOUS at 21:47

## 2022-04-10 RX ADMIN — Medication 50 MILLIGRAM(S): at 09:51

## 2022-04-10 RX ADMIN — Medication 1 GRAM(S): at 18:39

## 2022-04-10 RX ADMIN — Medication 1 GRAM(S): at 00:00

## 2022-04-10 RX ADMIN — ATORVASTATIN CALCIUM 40 MILLIGRAM(S): 80 TABLET, FILM COATED ORAL at 21:50

## 2022-04-10 RX ADMIN — Medication 1 GRAM(S): at 11:19

## 2022-04-10 RX ADMIN — Medication 100 MILLIGRAM(S): at 21:51

## 2022-04-10 RX ADMIN — Medication 81 MILLIGRAM(S): at 09:50

## 2022-04-10 RX ADMIN — CHLORHEXIDINE GLUCONATE 15 MILLILITER(S): 213 SOLUTION TOPICAL at 13:48

## 2022-04-10 RX ADMIN — Medication 50 MILLIGRAM(S): at 18:39

## 2022-04-10 RX ADMIN — ESCITALOPRAM OXALATE 20 MILLIGRAM(S): 10 TABLET, FILM COATED ORAL at 09:51

## 2022-04-10 NOTE — PROGRESS NOTE ADULT - ASSESSMENT
72M with PMHx of MI s/p stents (1983), CAD s/p multiple balloon angioplasties up to a few years ago, DM II,  HLD, HTN, thyroid cancer s/p thyroidectomy, cholecystitis s/p sepsis requiring cholecystectomy, PUD s/p gastric bypass, spinal stenosis, depression presented to the [place] ED 4/8/22 c/o of chest pain, nausea, and regurgitation. Pt underwent cardiac catheterization at [facility] showing multivessel disease and was transferred to [facility] for CABG evaluation.

## 2022-04-10 NOTE — PROGRESS NOTE ADULT - SUBJECTIVE AND OBJECTIVE BOX
Patient seen and examined.  Denies CP, SOB, N/V.  Some questions about scheduled surgery answered.      T(C): 36.8 (04-09-22 @ 21:47)  T(F): 98.3 (04-09-22 @ 21:47)  HR: 68 (04-09-22 @ 21:47)  BP: 148/78 (04-09-22 @ 21:47)    RR: 18 (04-09-22 @ 21:47)  SpO2: 95% (04-09-22 @ 21:47)      Physical Exam:  Gen: A&Ox3  Pulm:  CTA b/l, no r/r/w  CV:  S1S2, RRR, no m/r/g  Abd: +BS, soft, NT, ND  Ext:  +DP b/l, no c/c/e      I&O's Detail    08 Apr 2022 07:01  -  09 Apr 2022 07:00  --------------------------------------------------------  IN:    Oral Fluid: 240 mL  Total IN: 240 mL    OUT:  Total OUT: 0 mL    Total NET: 240 mL      09 Apr 2022 07:01  -  10 Apr 2022 00:24  --------------------------------------------------------  IN:    Oral Fluid: 360 mL  Total IN: 360 mL    OUT:  Total OUT: 0 mL    Total NET: 360 mL                              12.0   9.89  )-----------( 150      ( 09 Apr 2022 02:58 )             37.3   04-09    134<L>  |  97<L>  |  25.9<H>  ----------------------------<  118<H>  4.0   |  24.0  |  0.66    Ca    9.1      09 Apr 2022 02:58  Phos  3.5     04-09  Mg     2.2     04-09    TPro  5.6<L>  /  Alb  3.6  /  TBili  0.4  /  DBili  0.1  /  AST  13  /  ALT  26  /  AlkPhos  49  04-09  aPTT: 25.7 sec; PT: 11.3 sec; INR: 0.97 ratio  04-09-22 @ 03:00         CAPILLARY BLOOD GLUCOSE      POCT Blood Glucose.: 164 mg/dL (09 Apr 2022 21:09)        Medications:  aspirin enteric coated 81 milliGRAM(s) Oral daily  atorvastatin 40 milliGRAM(s) Oral at bedtime  dextrose 50% Injectable 25 Gram(s) IV Push once  dextrose 50% Injectable 12.5 Gram(s) IV Push once  dextrose 50% Injectable 25 Gram(s) IV Push once  dextrose Oral Gel 15 Gram(s) Oral once  enoxaparin Injectable 40 milliGRAM(s) SubCutaneous every 24 hours  escitalopram 20 milliGRAM(s) Oral daily  glucagon  Injectable 1 milliGRAM(s) IntraMuscular once  insulin lispro (ADMELOG) corrective regimen sliding scale   SubCutaneous Before meals and at bedtime  levothyroxine 137 MICROGram(s) Oral daily  losartan 100 milliGRAM(s) Oral daily  metoprolol tartrate 50 milliGRAM(s) Oral two times a day  pantoprazole    Tablet 40 milliGRAM(s) Oral before breakfast  sodium chloride 0.9% lock flush 3 milliLiter(s) IV Push every 8 hours  sucralfate 1 Gram(s) Oral four times a day  traZODone 100 milliGRAM(s) Oral at bedtime      CT:  < from: CT Chest No Cont (04.09.22 @ 15:46) >  FINDINGS:    Tubes/Lines: None.    Lungs, airways and pleura: The bilateral lower lobe are 2 mm noncalcified   nodules (right, series 4 image 136; left, series 4 image 107, 114).    Mediastinum: Thyroidectomy. The chest lymph nodes measure less than 10 mm   in the short axis.    The esophagus is normal. The heart is normal in size. Small amount   pericardial fluid. Coronary artery calcifications. Aortic valve   calcifications. Mitral annular calcification. Left-sided aortic arch and   left-sided descending thoracic aorta. Atheromatous disease of the aorta.    Upper Abdomen: Cholecystectomy. Status post gastric bypass.    Bones And Soft Tissues: The bones are unremarkable.  The soft tissues are   unremarkable.      IMPRESSION:    1.  Thyroidectomy.  2.  No enlarged chest lymph nodes.  3.  Bilateral 2 mm lower lobe noncalcified nodules.    < end of copied text >

## 2022-04-11 ENCOUNTER — APPOINTMENT (OUTPATIENT)
Dept: CARDIOTHORACIC SURGERY | Facility: HOSPITAL | Age: 72
End: 2022-04-11

## 2022-04-11 ENCOUNTER — RESULT REVIEW (OUTPATIENT)
Age: 72
End: 2022-04-11

## 2022-04-11 LAB
ALBUMIN SERPL ELPH-MCNC: 2.7 G/DL — LOW (ref 3.3–5.2)
ALP SERPL-CCNC: 29 U/L — LOW (ref 40–120)
ALT FLD-CCNC: 31 U/L — SIGNIFICANT CHANGE UP
ANION GAP SERPL CALC-SCNC: 11 MMOL/L — SIGNIFICANT CHANGE UP (ref 5–17)
APTT BLD: 29.1 SEC — SIGNIFICANT CHANGE UP (ref 27.5–35.5)
AST SERPL-CCNC: 53 U/L — HIGH
BILIRUB SERPL-MCNC: 0.6 MG/DL — SIGNIFICANT CHANGE UP (ref 0.4–2)
BUN SERPL-MCNC: 19 MG/DL — SIGNIFICANT CHANGE UP (ref 8–20)
CALCIUM SERPL-MCNC: 8.1 MG/DL — LOW (ref 8.6–10.2)
CHLORIDE SERPL-SCNC: 103 MMOL/L — SIGNIFICANT CHANGE UP (ref 98–107)
CK MB CFR SERPL CALC: 23.2 NG/ML — HIGH (ref 0–6.7)
CK SERPL-CCNC: 181 U/L — SIGNIFICANT CHANGE UP (ref 30–200)
CO2 SERPL-SCNC: 24 MMOL/L — SIGNIFICANT CHANGE UP (ref 22–29)
CREAT SERPL-MCNC: 0.5 MG/DL — SIGNIFICANT CHANGE UP (ref 0.5–1.3)
EGFR: 108 ML/MIN/1.73M2 — SIGNIFICANT CHANGE UP
GAS PNL BLDA: SIGNIFICANT CHANGE UP
GAS PNL BLDA: SIGNIFICANT CHANGE UP
GLUCOSE BLDC GLUCOMTR-MCNC: 105 MG/DL — HIGH (ref 70–99)
GLUCOSE BLDC GLUCOMTR-MCNC: 131 MG/DL — HIGH (ref 70–99)
GLUCOSE BLDC GLUCOMTR-MCNC: 138 MG/DL — HIGH (ref 70–99)
GLUCOSE BLDC GLUCOMTR-MCNC: 165 MG/DL — HIGH (ref 70–99)
GLUCOSE BLDC GLUCOMTR-MCNC: 182 MG/DL — HIGH (ref 70–99)
GLUCOSE BLDC GLUCOMTR-MCNC: 189 MG/DL — HIGH (ref 70–99)
GLUCOSE BLDC GLUCOMTR-MCNC: 206 MG/DL — HIGH (ref 70–99)
GLUCOSE BLDC GLUCOMTR-MCNC: 245 MG/DL — HIGH (ref 70–99)
GLUCOSE BLDC GLUCOMTR-MCNC: 92 MG/DL — SIGNIFICANT CHANGE UP (ref 70–99)
GLUCOSE SERPL-MCNC: 157 MG/DL — HIGH (ref 70–99)
HCT VFR BLD CALC: 30.8 % — LOW (ref 39–50)
HGB BLD-MCNC: 10.5 G/DL — LOW (ref 13–17)
INR BLD: 1.43 RATIO — HIGH (ref 0.88–1.16)
MAGNESIUM SERPL-MCNC: 1.8 MG/DL — SIGNIFICANT CHANGE UP (ref 1.6–2.6)
MCHC RBC-ENTMCNC: 27 PG — SIGNIFICANT CHANGE UP (ref 27–34)
MCHC RBC-ENTMCNC: 34.1 GM/DL — SIGNIFICANT CHANGE UP (ref 32–36)
MCV RBC AUTO: 79.2 FL — LOW (ref 80–100)
PLATELET # BLD AUTO: 77 K/UL — LOW (ref 150–400)
POTASSIUM SERPL-MCNC: 4 MMOL/L — SIGNIFICANT CHANGE UP (ref 3.5–5.3)
POTASSIUM SERPL-SCNC: 4 MMOL/L — SIGNIFICANT CHANGE UP (ref 3.5–5.3)
PROT SERPL-MCNC: 4 G/DL — LOW (ref 6.6–8.7)
PROTHROM AB SERPL-ACNC: 16.7 SEC — HIGH (ref 10.5–13.4)
RBC # BLD: 3.89 M/UL — LOW (ref 4.2–5.8)
RBC # FLD: 18.6 % — HIGH (ref 10.3–14.5)
SODIUM SERPL-SCNC: 138 MMOL/L — SIGNIFICANT CHANGE UP (ref 135–145)
THYROGLOB AB FLD IA-ACNC: <20 IU/ML — SIGNIFICANT CHANGE UP
THYROGLOB AB SERPL-ACNC: <20 IU/ML — SIGNIFICANT CHANGE UP
THYROGLOBULIN REFLEX TO MS OR IA RESULT: <1.8 IU/ML — SIGNIFICANT CHANGE UP
TROPONIN T SERPL-MCNC: 0.22 NG/ML — HIGH (ref 0–0.06)
WBC # BLD: 12.91 K/UL — HIGH (ref 3.8–10.5)
WBC # FLD AUTO: 12.91 K/UL — HIGH (ref 3.8–10.5)

## 2022-04-11 PROCEDURE — 76998 US GUIDE INTRAOP: CPT | Mod: 26,59

## 2022-04-11 PROCEDURE — 33518 CABG ARTERY-VEIN TWO: CPT | Mod: AS

## 2022-04-11 PROCEDURE — 88311 DECALCIFY TISSUE: CPT | Mod: 26

## 2022-04-11 PROCEDURE — 33518 CABG ARTERY-VEIN TWO: CPT

## 2022-04-11 PROCEDURE — 33533 CABG ARTERIAL SINGLE: CPT

## 2022-04-11 PROCEDURE — 33572 OPEN CORONARY ENDARTERECTOMY: CPT

## 2022-04-11 PROCEDURE — 33533 CABG ARTERIAL SINGLE: CPT | Mod: AS

## 2022-04-11 PROCEDURE — 33572 OPEN CORONARY ENDARTERECTOMY: CPT | Mod: AS

## 2022-04-11 PROCEDURE — 99233 SBSQ HOSP IP/OBS HIGH 50: CPT

## 2022-04-11 PROCEDURE — 33508 ENDOSCOPIC VEIN HARVEST: CPT | Mod: 59

## 2022-04-11 PROCEDURE — 71045 X-RAY EXAM CHEST 1 VIEW: CPT | Mod: 26

## 2022-04-11 PROCEDURE — 88304 TISSUE EXAM BY PATHOLOGIST: CPT | Mod: 26

## 2022-04-11 PROCEDURE — 93010 ELECTROCARDIOGRAM REPORT: CPT

## 2022-04-11 PROCEDURE — 76998 US GUIDE INTRAOP: CPT | Mod: 26,NC,AS,59

## 2022-04-11 DEVICE — SYS EXCLUSION LAA ATRICLIP STD 50MM: Type: IMPLANTABLE DEVICE | Status: FUNCTIONAL

## 2022-04-11 DEVICE — CATH CARDIOPLEGIA RETROGRADE: Type: IMPLANTABLE DEVICE | Status: FUNCTIONAL

## 2022-04-11 DEVICE — SYS EXCLUSION LAA DISP ATRICLIP STD 40MM: Type: IMPLANTABLE DEVICE | Status: FUNCTIONAL

## 2022-04-11 DEVICE — SYS EXCLUSION LAA DISP ATRICLIP STD 35MM: Type: IMPLANTABLE DEVICE | Status: FUNCTIONAL

## 2022-04-11 DEVICE — OCCL VES INT FLO-RES 1X12MM: Type: IMPLANTABLE DEVICE | Status: FUNCTIONAL

## 2022-04-11 DEVICE — CANNULA AOR ROOT FLNG 7FRX14CM: Type: IMPLANTABLE DEVICE | Status: FUNCTIONAL

## 2022-04-11 DEVICE — CATH THERMODIL PACE 7.5FR: Type: IMPLANTABLE DEVICE | Status: FUNCTIONAL

## 2022-04-11 DEVICE — IMPLANTABLE DEVICE: Type: IMPLANTABLE DEVICE | Status: FUNCTIONAL

## 2022-04-11 DEVICE — OCCL VES INT FLO-RES 2.5X12MM: Type: IMPLANTABLE DEVICE | Status: FUNCTIONAL

## 2022-04-11 DEVICE — SHUNT INTLUMN 1.5X12MM: Type: IMPLANTABLE DEVICE | Status: FUNCTIONAL

## 2022-04-11 DEVICE — SPONGE GELFOAM SZ 100 UNCOMPRESSED: Type: IMPLANTABLE DEVICE | Status: FUNCTIONAL

## 2022-04-11 DEVICE — SPONGE HSTAT SURGCEL FIBRILLAR 4X4IN: Type: IMPLANTABLE DEVICE | Status: FUNCTIONAL

## 2022-04-11 DEVICE — BONE WAX 2.5GM: Type: IMPLANTABLE DEVICE | Status: FUNCTIONAL

## 2022-04-11 DEVICE — CANNULA ATRA SUMP .25IN 38CM: Type: IMPLANTABLE DEVICE | Status: FUNCTIONAL

## 2022-04-11 DEVICE — SYS EXCLUSION LAA DISP ATRICLIP STD 45MM: Type: IMPLANTABLE DEVICE | Status: FUNCTIONAL

## 2022-04-11 DEVICE — SHUNT INTLUMN 3X12MM: Type: IMPLANTABLE DEVICE | Status: FUNCTIONAL

## 2022-04-11 DEVICE — CATH INFUS SL 7FR 20CM: Type: IMPLANTABLE DEVICE | Status: FUNCTIONAL

## 2022-04-11 DEVICE — SEALANT FLOSEAL FAST PREP HEMOSTATIC MATRIX 10ML: Type: IMPLANTABLE DEVICE | Status: FUNCTIONAL

## 2022-04-11 DEVICE — CATH VENTRICULAR VENT PVC 18FRX10.8CM: Type: IMPLANTABLE DEVICE | Status: FUNCTIONAL

## 2022-04-11 DEVICE — MEDIASTINAL CATH DRAIN 9MM: Type: IMPLANTABLE DEVICE | Status: FUNCTIONAL

## 2022-04-11 DEVICE — SHUNT E/OTH2U: Type: IMPLANTABLE DEVICE | Status: FUNCTIONAL

## 2022-04-11 DEVICE — CANNULA VENOUS 11.3X15.3MM: Type: IMPLANTABLE DEVICE | Status: FUNCTIONAL

## 2022-04-11 DEVICE — SHUNT INTLUMN 2.5X12MM: Type: IMPLANTABLE DEVICE | Status: FUNCTIONAL

## 2022-04-11 DEVICE — BIOGLUE 5ML SYR: Type: IMPLANTABLE DEVICE | Status: FUNCTIONAL

## 2022-04-11 DEVICE — KIT CVC 2LUM MAC 9FR CHG: Type: IMPLANTABLE DEVICE | Status: FUNCTIONAL

## 2022-04-11 DEVICE — LIGATING CLIPS AESCULAP SMALL WIDE 24: Type: IMPLANTABLE DEVICE | Status: FUNCTIONAL

## 2022-04-11 DEVICE — CANNULA SOFT FLOW ART EXT BODY 8MM 24FR: Type: IMPLANTABLE DEVICE | Status: FUNCTIONAL

## 2022-04-11 DEVICE — CATH THOR RT ANG 28FR: Type: IMPLANTABLE DEVICE | Status: FUNCTIONAL

## 2022-04-11 DEVICE — M25 WHT WIRE PACING TEMP: Type: IMPLANTABLE DEVICE | Status: FUNCTIONAL

## 2022-04-11 DEVICE — CANNULA MEDTRONIC VES 1 WAY 3MMX6.4CM: Type: IMPLANTABLE DEVICE | Status: FUNCTIONAL

## 2022-04-11 DEVICE — SHUNT INTLUMN FLO-THRU 2X12MM: Type: IMPLANTABLE DEVICE | Status: FUNCTIONAL

## 2022-04-11 DEVICE — CANNULA SOFT FLOW ART EXT BODY 7MM 21FR: Type: IMPLANTABLE DEVICE | Status: FUNCTIONAL

## 2022-04-11 DEVICE — OCCL VES INT FLO-RES 2X12MM: Type: IMPLANTABLE DEVICE | Status: FUNCTIONAL

## 2022-04-11 DEVICE — HEARTSTRING III PROXIMAL SEAL SYSTEM: Type: IMPLANTABLE DEVICE | Status: FUNCTIONAL

## 2022-04-11 DEVICE — ELECT PACE MYOCARDIAL TEMP ORANGE: Type: IMPLANTABLE DEVICE | Status: FUNCTIONAL

## 2022-04-11 DEVICE — KIT A-LINE 1LUM 20GX12CM SAFE KIT: Type: IMPLANTABLE DEVICE | Status: FUNCTIONAL

## 2022-04-11 DEVICE — CANNULA VENOUS DUAL DRAINAGE LIGHTHOUSE TIP 32 TO 40FR X 37.: Type: IMPLANTABLE DEVICE | Status: FUNCTIONAL

## 2022-04-11 RX ORDER — DEXTROSE 50 % IN WATER 50 %
50 SYRINGE (ML) INTRAVENOUS
Refills: 0 | Status: DISCONTINUED | OUTPATIENT
Start: 2022-04-11 | End: 2022-04-12

## 2022-04-11 RX ORDER — TAMSULOSIN HYDROCHLORIDE 0.4 MG/1
0.8 CAPSULE ORAL AT BEDTIME
Refills: 0 | Status: DISCONTINUED | OUTPATIENT
Start: 2022-04-11 | End: 2022-04-16

## 2022-04-11 RX ORDER — ACETAMINOPHEN 500 MG
1000 TABLET ORAL ONCE
Refills: 0 | Status: COMPLETED | OUTPATIENT
Start: 2022-04-11 | End: 2022-04-11

## 2022-04-11 RX ORDER — HYDROMORPHONE HYDROCHLORIDE 2 MG/ML
0.25 INJECTION INTRAMUSCULAR; INTRAVENOUS; SUBCUTANEOUS ONCE
Refills: 0 | Status: DISCONTINUED | OUTPATIENT
Start: 2022-04-11 | End: 2022-04-11

## 2022-04-11 RX ORDER — CLOPIDOGREL BISULFATE 75 MG/1
75 TABLET, FILM COATED ORAL ONCE
Refills: 0 | Status: DISCONTINUED | OUTPATIENT
Start: 2022-04-11 | End: 2022-04-11

## 2022-04-11 RX ORDER — NICARDIPINE HYDROCHLORIDE 30 MG/1
5 CAPSULE, EXTENDED RELEASE ORAL
Qty: 40 | Refills: 0 | Status: DISCONTINUED | OUTPATIENT
Start: 2022-04-11 | End: 2022-04-12

## 2022-04-11 RX ORDER — ASPIRIN/CALCIUM CARB/MAGNESIUM 324 MG
324 TABLET ORAL ONCE
Refills: 0 | Status: DISCONTINUED | OUTPATIENT
Start: 2022-04-11 | End: 2022-04-11

## 2022-04-11 RX ORDER — PROPOFOL 10 MG/ML
10 INJECTION, EMULSION INTRAVENOUS
Qty: 1000 | Refills: 0 | Status: DISCONTINUED | OUTPATIENT
Start: 2022-04-11 | End: 2022-04-11

## 2022-04-11 RX ORDER — ATORVASTATIN CALCIUM 80 MG/1
80 TABLET, FILM COATED ORAL AT BEDTIME
Refills: 0 | Status: DISCONTINUED | OUTPATIENT
Start: 2022-04-11 | End: 2022-04-16

## 2022-04-11 RX ORDER — POTASSIUM CHLORIDE 20 MEQ
10 PACKET (EA) ORAL
Refills: 0 | Status: DISCONTINUED | OUTPATIENT
Start: 2022-04-11 | End: 2022-04-11

## 2022-04-11 RX ORDER — CEFUROXIME AXETIL 250 MG
1500 TABLET ORAL EVERY 8 HOURS
Refills: 0 | Status: COMPLETED | OUTPATIENT
Start: 2022-04-11 | End: 2022-04-13

## 2022-04-11 RX ORDER — CLOPIDOGREL BISULFATE 75 MG/1
75 TABLET, FILM COATED ORAL DAILY
Refills: 0 | Status: DISCONTINUED | OUTPATIENT
Start: 2022-04-12 | End: 2022-04-16

## 2022-04-11 RX ORDER — CHLORHEXIDINE GLUCONATE 213 G/1000ML
1 SOLUTION TOPICAL DAILY
Refills: 0 | Status: DISCONTINUED | OUTPATIENT
Start: 2022-04-11 | End: 2022-04-13

## 2022-04-11 RX ORDER — POTASSIUM CHLORIDE 20 MEQ
10 PACKET (EA) ORAL
Refills: 0 | Status: COMPLETED | OUTPATIENT
Start: 2022-04-11 | End: 2022-04-11

## 2022-04-11 RX ORDER — SENNA PLUS 8.6 MG/1
2 TABLET ORAL AT BEDTIME
Refills: 0 | Status: DISCONTINUED | OUTPATIENT
Start: 2022-04-11 | End: 2022-04-16

## 2022-04-11 RX ORDER — MAGNESIUM SULFATE 500 MG/ML
2 VIAL (ML) INJECTION ONCE
Refills: 0 | Status: COMPLETED | OUTPATIENT
Start: 2022-04-11 | End: 2022-04-11

## 2022-04-11 RX ORDER — ESCITALOPRAM OXALATE 10 MG/1
20 TABLET, FILM COATED ORAL DAILY
Refills: 0 | Status: DISCONTINUED | OUTPATIENT
Start: 2022-04-12 | End: 2022-04-16

## 2022-04-11 RX ORDER — POLYETHYLENE GLYCOL 3350 17 G/17G
17 POWDER, FOR SOLUTION ORAL DAILY
Refills: 0 | Status: DISCONTINUED | OUTPATIENT
Start: 2022-04-12 | End: 2022-04-16

## 2022-04-11 RX ORDER — HYDROMORPHONE HYDROCHLORIDE 2 MG/ML
0.5 INJECTION INTRAMUSCULAR; INTRAVENOUS; SUBCUTANEOUS ONCE
Refills: 0 | Status: DISCONTINUED | OUTPATIENT
Start: 2022-04-11 | End: 2022-04-11

## 2022-04-11 RX ORDER — PANTOPRAZOLE SODIUM 20 MG/1
40 TABLET, DELAYED RELEASE ORAL DAILY
Refills: 0 | Status: DISCONTINUED | OUTPATIENT
Start: 2022-04-12 | End: 2022-04-16

## 2022-04-11 RX ORDER — ASPIRIN/CALCIUM CARB/MAGNESIUM 324 MG
81 TABLET ORAL DAILY
Refills: 0 | Status: DISCONTINUED | OUTPATIENT
Start: 2022-04-12 | End: 2022-04-16

## 2022-04-11 RX ORDER — VANCOMYCIN HCL 1 G
1000 VIAL (EA) INTRAVENOUS EVERY 12 HOURS
Refills: 0 | Status: COMPLETED | OUTPATIENT
Start: 2022-04-11 | End: 2022-04-13

## 2022-04-11 RX ORDER — SODIUM CHLORIDE 9 MG/ML
1000 INJECTION, SOLUTION INTRAVENOUS ONCE
Refills: 0 | Status: COMPLETED | OUTPATIENT
Start: 2022-04-11 | End: 2022-04-11

## 2022-04-11 RX ORDER — SODIUM CHLORIDE 9 MG/ML
1000 INJECTION INTRAMUSCULAR; INTRAVENOUS; SUBCUTANEOUS
Refills: 0 | Status: DISCONTINUED | OUTPATIENT
Start: 2022-04-11 | End: 2022-04-13

## 2022-04-11 RX ORDER — INSULIN HUMAN 100 [IU]/ML
2 INJECTION, SOLUTION SUBCUTANEOUS
Qty: 50 | Refills: 0 | Status: DISCONTINUED | OUTPATIENT
Start: 2022-04-11 | End: 2022-04-13

## 2022-04-11 RX ORDER — SODIUM CHLORIDE 9 MG/ML
500 INJECTION, SOLUTION INTRAVENOUS ONCE
Refills: 0 | Status: COMPLETED | OUTPATIENT
Start: 2022-04-11 | End: 2022-04-11

## 2022-04-11 RX ORDER — PANTOPRAZOLE SODIUM 20 MG/1
40 TABLET, DELAYED RELEASE ORAL ONCE
Refills: 0 | Status: COMPLETED | OUTPATIENT
Start: 2022-04-11 | End: 2022-04-11

## 2022-04-11 RX ORDER — DEXTROSE 50 % IN WATER 50 %
25 SYRINGE (ML) INTRAVENOUS
Refills: 0 | Status: DISCONTINUED | OUTPATIENT
Start: 2022-04-11 | End: 2022-04-12

## 2022-04-11 RX ORDER — ASPIRIN/CALCIUM CARB/MAGNESIUM 324 MG
81 TABLET ORAL ONCE
Refills: 0 | Status: COMPLETED | OUTPATIENT
Start: 2022-04-11 | End: 2022-04-11

## 2022-04-11 RX ORDER — CHLORHEXIDINE GLUCONATE 213 G/1000ML
15 SOLUTION TOPICAL EVERY 12 HOURS
Refills: 0 | Status: DISCONTINUED | OUTPATIENT
Start: 2022-04-11 | End: 2022-04-11

## 2022-04-11 RX ORDER — LEVOTHYROXINE SODIUM 125 MCG
137 TABLET ORAL DAILY
Refills: 0 | Status: DISCONTINUED | OUTPATIENT
Start: 2022-04-11 | End: 2022-04-16

## 2022-04-11 RX ADMIN — PROPOFOL 4.08 MICROGRAM(S)/KG/MIN: 10 INJECTION, EMULSION INTRAVENOUS at 14:47

## 2022-04-11 RX ADMIN — Medication 100 MILLIEQUIVALENT(S): at 22:55

## 2022-04-11 RX ADMIN — HYDROMORPHONE HYDROCHLORIDE 0.25 MILLIGRAM(S): 2 INJECTION INTRAMUSCULAR; INTRAVENOUS; SUBCUTANEOUS at 18:35

## 2022-04-11 RX ADMIN — Medication 1 GRAM(S): at 05:36

## 2022-04-11 RX ADMIN — ATORVASTATIN CALCIUM 80 MILLIGRAM(S): 80 TABLET, FILM COATED ORAL at 21:06

## 2022-04-11 RX ADMIN — PROPOFOL 4.08 MICROGRAM(S)/KG/MIN: 10 INJECTION, EMULSION INTRAVENOUS at 13:34

## 2022-04-11 RX ADMIN — HYDROMORPHONE HYDROCHLORIDE 0.25 MILLIGRAM(S): 2 INJECTION INTRAMUSCULAR; INTRAVENOUS; SUBCUTANEOUS at 20:00

## 2022-04-11 RX ADMIN — SODIUM CHLORIDE 5 MILLILITER(S): 9 INJECTION INTRAMUSCULAR; INTRAVENOUS; SUBCUTANEOUS at 17:28

## 2022-04-11 RX ADMIN — Medication 100 MILLIEQUIVALENT(S): at 23:56

## 2022-04-11 RX ADMIN — Medication 100 MILLIGRAM(S): at 16:28

## 2022-04-11 RX ADMIN — Medication 400 MILLIGRAM(S): at 19:12

## 2022-04-11 RX ADMIN — CHLORHEXIDINE GLUCONATE 15 MILLILITER(S): 213 SOLUTION TOPICAL at 05:35

## 2022-04-11 RX ADMIN — TAMSULOSIN HYDROCHLORIDE 0.8 MILLIGRAM(S): 0.4 CAPSULE ORAL at 21:07

## 2022-04-11 RX ADMIN — Medication 100 MILLIEQUIVALENT(S): at 14:46

## 2022-04-11 RX ADMIN — Medication 1000 MILLIGRAM(S): at 19:13

## 2022-04-11 RX ADMIN — HYDROMORPHONE HYDROCHLORIDE 0.5 MILLIGRAM(S): 2 INJECTION INTRAMUSCULAR; INTRAVENOUS; SUBCUTANEOUS at 22:40

## 2022-04-11 RX ADMIN — SODIUM CHLORIDE 10 MILLILITER(S): 9 INJECTION INTRAMUSCULAR; INTRAVENOUS; SUBCUTANEOUS at 17:28

## 2022-04-11 RX ADMIN — SODIUM CHLORIDE 3 MILLILITER(S): 9 INJECTION INTRAMUSCULAR; INTRAVENOUS; SUBCUTANEOUS at 05:42

## 2022-04-11 RX ADMIN — HYDROMORPHONE HYDROCHLORIDE 0.25 MILLIGRAM(S): 2 INJECTION INTRAMUSCULAR; INTRAVENOUS; SUBCUTANEOUS at 19:04

## 2022-04-11 RX ADMIN — Medication 25 GRAM(S): at 14:46

## 2022-04-11 RX ADMIN — INSULIN HUMAN 2 UNIT(S)/HR: 100 INJECTION, SOLUTION SUBCUTANEOUS at 16:30

## 2022-04-11 RX ADMIN — PANTOPRAZOLE SODIUM 40 MILLIGRAM(S): 20 TABLET, DELAYED RELEASE ORAL at 05:37

## 2022-04-11 RX ADMIN — PANTOPRAZOLE SODIUM 40 MILLIGRAM(S): 20 TABLET, DELAYED RELEASE ORAL at 14:46

## 2022-04-11 RX ADMIN — SODIUM CHLORIDE 1000 MILLILITER(S): 9 INJECTION, SOLUTION INTRAVENOUS at 17:27

## 2022-04-11 RX ADMIN — Medication 137 MICROGRAM(S): at 05:37

## 2022-04-11 RX ADMIN — Medication 100 MILLIEQUIVALENT(S): at 15:29

## 2022-04-11 RX ADMIN — Medication 81 MILLIGRAM(S): at 20:34

## 2022-04-11 RX ADMIN — Medication 250 MILLIGRAM(S): at 20:34

## 2022-04-11 RX ADMIN — SENNA PLUS 2 TABLET(S): 8.6 TABLET ORAL at 21:07

## 2022-04-11 RX ADMIN — NICARDIPINE HYDROCHLORIDE 25 MG/HR: 30 CAPSULE, EXTENDED RELEASE ORAL at 14:46

## 2022-04-11 RX ADMIN — CHLORHEXIDINE GLUCONATE 15 MILLILITER(S): 213 SOLUTION TOPICAL at 18:33

## 2022-04-11 RX ADMIN — Medication 100 MILLIEQUIVALENT(S): at 22:13

## 2022-04-11 RX ADMIN — SODIUM CHLORIDE 500 MILLILITER(S): 9 INJECTION, SOLUTION INTRAVENOUS at 13:47

## 2022-04-11 RX ADMIN — CHLORHEXIDINE GLUCONATE 1 APPLICATION(S): 213 SOLUTION TOPICAL at 05:42

## 2022-04-11 RX ADMIN — HYDROMORPHONE HYDROCHLORIDE 0.5 MILLIGRAM(S): 2 INJECTION INTRAMUSCULAR; INTRAVENOUS; SUBCUTANEOUS at 22:55

## 2022-04-11 RX ADMIN — SODIUM CHLORIDE 500 MILLILITER(S): 9 INJECTION, SOLUTION INTRAVENOUS at 22:58

## 2022-04-11 NOTE — BRIEF OPERATIVE NOTE - NSICDXBRIEFPROCEDURE_GEN_ALL_CORE_FT
PROCEDURES:  CABG, with COOKIE 11-Apr-2022 13:14:05  Clarke Echevarria  Coronary endarterectomy 11-Apr-2022 13:16:12 LAD Clarke Echevarria

## 2022-04-11 NOTE — PROGRESS NOTE ADULT - ASSESSMENT
72M with PMHx of MI s/p stents (1983), CAD s/p multiple balloon angioplasties up to a few years ago, DM II,  HLD, HTN, thyroid cancer s/p thyroidectomy, cholecystitis s/p sepsis requiring cholecystectomy,   PUD s/p gastric bypass, spinal stenosis, depression presented to the [place] ED 4/8/22 c/o of chest pain, nausea, and regurgitation. Pt underwent cardiac catheterization at [place] showing multivessel disease and was transferred to [facility] for CABG, s/p CABG 4/11/22.Endo consulted for DM and hypothyroidism.  T2DM x15 years, persisted despite bariatric surgery.  He reports good control with outpt A1c consistently around 7%.  He does not test his sugars.    He is currently taking Victoza 1.8 mg daily, MFN 1000 mg BID and Jardiance 10 mg daily.      T2DM complicated by CAD - A1c 7.5%, glucoses controlled in hospital  -will start insulin gtt per CT surgery protocol.  -Will transition when appropriate,     Papillary thyroid cancer s/p Total thyroidectomy in 2018 with reported recurrence in upper chest area which he thinks is growing. TSH <0.01 and is at goal given thyroid cancer with mets to chest  - cont LT4 137 mcg daily  - tried calling Dr Barber, to get more info on the chest recurrence, but office closed  - check thyroglobulin and Tg ab  - check free T4 levels     CAD (coronary artery disease) p/p Cath with multivessel disease and in-stent stenosis  -s/p  CABG today   - continue ASA, Lipitor, and Lopressor as BP permits

## 2022-04-11 NOTE — BRIEF OPERATIVE NOTE - COMMENTS
Cell Saver Utilized - Unable to Quantify Blood Loss  Invasive Lines: RRAL, Rt IJ Cordis   IV Medication Infusions: Levo, Insulin   No qualified resident was available to assist in this case. I have personally first assisted the Cardiothoracic Surgeon listed in this brief op note throughout the entirety of this case.

## 2022-04-11 NOTE — PROGRESS NOTE ADULT - SUBJECTIVE AND OBJECTIVE BOX
INTERVAL HPI/OVERNIGHT EVENTS:  still intubated, just cam out of OR    MEDICATIONS  (STANDING):  acetaminophen   IVPB .. 1000 milliGRAM(s) IV Intermittent once  aspirin  chewable 81 milliGRAM(s) Oral once  atorvastatin 80 milliGRAM(s) Oral at bedtime  cefuroxime  IVPB 1500 milliGRAM(s) IV Intermittent every 8 hours  chlorhexidine 0.12% Liquid 15 milliLiter(s) Oral Mucosa every 12 hours  chlorhexidine 2% Cloths 1 Application(s) Topical daily  dextrose 50% Injectable 50 milliLiter(s) IV Push every 15 minutes  dextrose 50% Injectable 25 milliLiter(s) IV Push every 15 minutes  insulin regular Infusion 2 Unit(s)/Hr (2 mL/Hr) IV Continuous <Continuous>  levothyroxine 137 MICROGram(s) Oral daily  magnesium sulfate  IVPB 2 Gram(s) IV Intermittent once  niCARdipine Infusion 5 mG/Hr (25 mL/Hr) IV Continuous <Continuous>  pantoprazole  Injectable 40 milliGRAM(s) IV Push once  potassium chloride  10 mEq/50 mL IVPB 10 milliEquivalent(s) IV Intermittent every 1 hour  potassium chloride  10 mEq/50 mL IVPB 10 milliEquivalent(s) IV Intermittent every 1 hour  potassium chloride  10 mEq/50 mL IVPB 10 milliEquivalent(s) IV Intermittent every 1 hour  potassium chloride  10 mEq/50 mL IVPB 10 milliEquivalent(s) IV Intermittent every 1 hour  propofol Infusion 10 MICROgram(s)/kG/Min (4.08 mL/Hr) IV Continuous <Continuous>  senna 2 Tablet(s) Oral at bedtime  sodium chloride 0.9%. 1000 milliLiter(s) (10 mL/Hr) IV Continuous <Continuous>  sodium chloride 0.9%. 1000 milliLiter(s) (5 mL/Hr) IV Continuous <Continuous>  tamsulosin 0.8 milliGRAM(s) Oral at bedtime  vancomycin  IVPB 1000 milliGRAM(s) IV Intermittent every 12 hours    MEDICATIONS  (PRN):      Allergies    No Known Allergies    Intolerances    ACE inhibitors (Other)  levofloxacin (Joint Pain)      Review of systems:  NOT DONE, patient intubated    Vital Signs Last 24 Hrs  T(C): 35.2 (11 Apr 2022 13:00), Max: 37.1 (10 Apr 2022 15:30)  T(F): 95.4 (11 Apr 2022 13:00), Max: 98.8 (10 Apr 2022 15:30)  HR: 58 (11 Apr 2022 13:15) (57 - 69)  BP: 164/86 (11 Apr 2022 05:59) (134/73 - 169/84)  BP(mean): --  RR: 10 (11 Apr 2022 13:15) (10 - 18)  SpO2: 100% (11 Apr 2022 13:15) (94% - 100%)    PHYSICAL EXAM:  Constitutional: intubated, well-groomed, well-developed  Respiratory: CTAB  Cardiovascular: S1 and S2, RRR, no M/G/R  Gastrointestinal: BS+, soft, no organomegaly or mass  Extremities: No peripheral edema, no pedal lesions  Skin: No rashes, no acanthosis        LABS:                        10.5   12.91 )-----------( 77       ( 11 Apr 2022 12:39 )             30.8     04-11    138  |  103  |  19.0  ----------------------------<  157<H>  4.0   |  24.0  |  0.50    Ca    8.1<L>      11 Apr 2022 12:39  Mg     1.8     04-11    TPro  4.0<L>  /  Alb  2.7<L>  /  TBili  0.6  /  DBili  x   /  AST  53<H>  /  ALT  31  /  AlkPhos  29<L>  04-11          CAPILLARY BLOOD GLUCOSE      RADIOLOGY & ADDITIONAL TESTS:

## 2022-04-12 ENCOUNTER — APPOINTMENT (OUTPATIENT)
Dept: ORTHOPEDIC SURGERY | Facility: CLINIC | Age: 72
End: 2022-04-12

## 2022-04-12 LAB
ALBUMIN SERPL ELPH-MCNC: 3.1 G/DL — LOW (ref 3.3–5.2)
ALP SERPL-CCNC: 44 U/L — SIGNIFICANT CHANGE UP (ref 40–120)
ALT FLD-CCNC: 58 U/L — HIGH
ANION GAP SERPL CALC-SCNC: 10 MMOL/L — SIGNIFICANT CHANGE UP (ref 5–17)
AST SERPL-CCNC: 75 U/L — HIGH
BILIRUB SERPL-MCNC: 0.5 MG/DL — SIGNIFICANT CHANGE UP (ref 0.4–2)
BUN SERPL-MCNC: 13.5 MG/DL — SIGNIFICANT CHANGE UP (ref 8–20)
CALCIUM SERPL-MCNC: 8.3 MG/DL — LOW (ref 8.6–10.2)
CHLORIDE SERPL-SCNC: 104 MMOL/L — SIGNIFICANT CHANGE UP (ref 98–107)
CK MB CFR SERPL CALC: 50 NG/ML — HIGH (ref 0–6.7)
CK SERPL-CCNC: 458 U/L — HIGH (ref 30–200)
CO2 SERPL-SCNC: 24 MMOL/L — SIGNIFICANT CHANGE UP (ref 22–29)
CREAT SERPL-MCNC: 0.47 MG/DL — LOW (ref 0.5–1.3)
EGFR: 110 ML/MIN/1.73M2 — SIGNIFICANT CHANGE UP
GLUCOSE BLDC GLUCOMTR-MCNC: 110 MG/DL — HIGH (ref 70–99)
GLUCOSE BLDC GLUCOMTR-MCNC: 118 MG/DL — HIGH (ref 70–99)
GLUCOSE BLDC GLUCOMTR-MCNC: 123 MG/DL — HIGH (ref 70–99)
GLUCOSE BLDC GLUCOMTR-MCNC: 130 MG/DL — HIGH (ref 70–99)
GLUCOSE BLDC GLUCOMTR-MCNC: 142 MG/DL — HIGH (ref 70–99)
GLUCOSE BLDC GLUCOMTR-MCNC: 145 MG/DL — HIGH (ref 70–99)
GLUCOSE BLDC GLUCOMTR-MCNC: 145 MG/DL — HIGH (ref 70–99)
GLUCOSE BLDC GLUCOMTR-MCNC: 147 MG/DL — HIGH (ref 70–99)
GLUCOSE BLDC GLUCOMTR-MCNC: 151 MG/DL — HIGH (ref 70–99)
GLUCOSE BLDC GLUCOMTR-MCNC: 155 MG/DL — HIGH (ref 70–99)
GLUCOSE BLDC GLUCOMTR-MCNC: 155 MG/DL — HIGH (ref 70–99)
GLUCOSE BLDC GLUCOMTR-MCNC: 160 MG/DL — HIGH (ref 70–99)
GLUCOSE BLDC GLUCOMTR-MCNC: 164 MG/DL — HIGH (ref 70–99)
GLUCOSE BLDC GLUCOMTR-MCNC: 185 MG/DL — HIGH (ref 70–99)
GLUCOSE BLDC GLUCOMTR-MCNC: 186 MG/DL — HIGH (ref 70–99)
GLUCOSE BLDC GLUCOMTR-MCNC: 217 MG/DL — HIGH (ref 70–99)
GLUCOSE SERPL-MCNC: 157 MG/DL — HIGH (ref 70–99)
HCT VFR BLD CALC: 35.2 % — LOW (ref 39–50)
HGB BLD-MCNC: 11.6 G/DL — LOW (ref 13–17)
MAGNESIUM SERPL-MCNC: 1.9 MG/DL — SIGNIFICANT CHANGE UP (ref 1.6–2.6)
MCHC RBC-ENTMCNC: 26.4 PG — LOW (ref 27–34)
MCHC RBC-ENTMCNC: 33 GM/DL — SIGNIFICANT CHANGE UP (ref 32–36)
MCV RBC AUTO: 80 FL — SIGNIFICANT CHANGE UP (ref 80–100)
PLATELET # BLD AUTO: 105 K/UL — LOW (ref 150–400)
POTASSIUM SERPL-MCNC: 4.8 MMOL/L — SIGNIFICANT CHANGE UP (ref 3.5–5.3)
POTASSIUM SERPL-SCNC: 4.8 MMOL/L — SIGNIFICANT CHANGE UP (ref 3.5–5.3)
PROT SERPL-MCNC: 4.8 G/DL — LOW (ref 6.6–8.7)
RBC # BLD: 4.4 M/UL — SIGNIFICANT CHANGE UP (ref 4.2–5.8)
RBC # FLD: 19.6 % — HIGH (ref 10.3–14.5)
SODIUM SERPL-SCNC: 138 MMOL/L — SIGNIFICANT CHANGE UP (ref 135–145)
TROPONIN T SERPL-MCNC: 0.6 NG/ML — HIGH (ref 0–0.06)
WBC # BLD: 17.64 K/UL — HIGH (ref 3.8–10.5)
WBC # FLD AUTO: 17.64 K/UL — HIGH (ref 3.8–10.5)

## 2022-04-12 PROCEDURE — 99024 POSTOP FOLLOW-UP VISIT: CPT

## 2022-04-12 PROCEDURE — 99233 SBSQ HOSP IP/OBS HIGH 50: CPT

## 2022-04-12 PROCEDURE — 93010 ELECTROCARDIOGRAM REPORT: CPT

## 2022-04-12 PROCEDURE — 93010 ELECTROCARDIOGRAM REPORT: CPT | Mod: 77

## 2022-04-12 PROCEDURE — 71045 X-RAY EXAM CHEST 1 VIEW: CPT | Mod: 26

## 2022-04-12 RX ORDER — ESCITALOPRAM OXALATE 10 MG/1
20 TABLET, FILM COATED ORAL DAILY
Refills: 0 | Status: DISCONTINUED | OUTPATIENT
Start: 2022-04-12 | End: 2022-04-12

## 2022-04-12 RX ORDER — INSULIN GLARGINE 100 [IU]/ML
16 INJECTION, SOLUTION SUBCUTANEOUS AT BEDTIME
Refills: 0 | Status: DISCONTINUED | OUTPATIENT
Start: 2022-04-12 | End: 2022-04-14

## 2022-04-12 RX ORDER — ACETAMINOPHEN 500 MG
1000 TABLET ORAL ONCE
Refills: 0 | Status: COMPLETED | OUTPATIENT
Start: 2022-04-12 | End: 2022-04-12

## 2022-04-12 RX ORDER — INSULIN LISPRO 100/ML
VIAL (ML) SUBCUTANEOUS
Refills: 0 | Status: DISCONTINUED | OUTPATIENT
Start: 2022-04-12 | End: 2022-04-16

## 2022-04-12 RX ORDER — FUROSEMIDE 40 MG
40 TABLET ORAL ONCE
Refills: 0 | Status: COMPLETED | OUTPATIENT
Start: 2022-04-12 | End: 2022-04-12

## 2022-04-12 RX ORDER — OXYCODONE HYDROCHLORIDE 5 MG/1
5 TABLET ORAL EVERY 4 HOURS
Refills: 0 | Status: DISCONTINUED | OUTPATIENT
Start: 2022-04-12 | End: 2022-04-16

## 2022-04-12 RX ORDER — OXYCODONE HYDROCHLORIDE 5 MG/1
10 TABLET ORAL EVERY 4 HOURS
Refills: 0 | Status: DISCONTINUED | OUTPATIENT
Start: 2022-04-12 | End: 2022-04-16

## 2022-04-12 RX ORDER — FUROSEMIDE 40 MG
40 TABLET ORAL DAILY
Refills: 0 | Status: DISCONTINUED | OUTPATIENT
Start: 2022-04-13 | End: 2022-04-16

## 2022-04-12 RX ORDER — MAGNESIUM SULFATE 500 MG/ML
2 VIAL (ML) INJECTION ONCE
Refills: 0 | Status: COMPLETED | OUTPATIENT
Start: 2022-04-12 | End: 2022-04-12

## 2022-04-12 RX ORDER — INSULIN LISPRO 100/ML
2 VIAL (ML) SUBCUTANEOUS ONCE
Refills: 0 | Status: COMPLETED | OUTPATIENT
Start: 2022-04-12 | End: 2022-04-12

## 2022-04-12 RX ORDER — ACETAMINOPHEN 500 MG
975 TABLET ORAL EVERY 6 HOURS
Refills: 0 | Status: DISCONTINUED | OUTPATIENT
Start: 2022-04-12 | End: 2022-04-16

## 2022-04-12 RX ORDER — ALBUMIN HUMAN 25 %
250 VIAL (ML) INTRAVENOUS ONCE
Refills: 0 | Status: COMPLETED | OUTPATIENT
Start: 2022-04-12 | End: 2022-04-12

## 2022-04-12 RX ORDER — METOPROLOL TARTRATE 50 MG
25 TABLET ORAL EVERY 12 HOURS
Refills: 0 | Status: DISCONTINUED | OUTPATIENT
Start: 2022-04-12 | End: 2022-04-16

## 2022-04-12 RX ORDER — HYDROMORPHONE HYDROCHLORIDE 2 MG/ML
0.5 INJECTION INTRAMUSCULAR; INTRAVENOUS; SUBCUTANEOUS
Refills: 0 | Status: DISCONTINUED | OUTPATIENT
Start: 2022-04-12 | End: 2022-04-13

## 2022-04-12 RX ORDER — INSULIN LISPRO 100/ML
4 VIAL (ML) SUBCUTANEOUS
Refills: 0 | Status: DISCONTINUED | OUTPATIENT
Start: 2022-04-12 | End: 2022-04-13

## 2022-04-12 RX ORDER — FUROSEMIDE 40 MG
20 TABLET ORAL ONCE
Refills: 0 | Status: COMPLETED | OUTPATIENT
Start: 2022-04-12 | End: 2022-04-12

## 2022-04-12 RX ORDER — HYDROMORPHONE HYDROCHLORIDE 2 MG/ML
0.5 INJECTION INTRAMUSCULAR; INTRAVENOUS; SUBCUTANEOUS ONCE
Refills: 0 | Status: DISCONTINUED | OUTPATIENT
Start: 2022-04-12 | End: 2022-04-12

## 2022-04-12 RX ORDER — POTASSIUM CHLORIDE 20 MEQ
40 PACKET (EA) ORAL DAILY
Refills: 0 | Status: DISCONTINUED | OUTPATIENT
Start: 2022-04-13 | End: 2022-04-16

## 2022-04-12 RX ORDER — TRAZODONE HCL 50 MG
100 TABLET ORAL AT BEDTIME
Refills: 0 | Status: DISCONTINUED | OUTPATIENT
Start: 2022-04-12 | End: 2022-04-16

## 2022-04-12 RX ORDER — ENOXAPARIN SODIUM 100 MG/ML
40 INJECTION SUBCUTANEOUS EVERY 24 HOURS
Refills: 0 | Status: DISCONTINUED | OUTPATIENT
Start: 2022-04-12 | End: 2022-04-16

## 2022-04-12 RX ADMIN — INSULIN GLARGINE 16 UNIT(S): 100 INJECTION, SOLUTION SUBCUTANEOUS at 22:53

## 2022-04-12 RX ADMIN — OXYCODONE HYDROCHLORIDE 10 MILLIGRAM(S): 5 TABLET ORAL at 11:44

## 2022-04-12 RX ADMIN — Medication 25 MILLIGRAM(S): at 06:22

## 2022-04-12 RX ADMIN — Medication 125 MILLILITER(S): at 02:32

## 2022-04-12 RX ADMIN — Medication 400 MILLIGRAM(S): at 10:14

## 2022-04-12 RX ADMIN — Medication 25 MILLIGRAM(S): at 17:03

## 2022-04-12 RX ADMIN — ENOXAPARIN SODIUM 40 MILLIGRAM(S): 100 INJECTION SUBCUTANEOUS at 11:45

## 2022-04-12 RX ADMIN — Medication 250 MILLIGRAM(S): at 07:57

## 2022-04-12 RX ADMIN — HYDROMORPHONE HYDROCHLORIDE 0.5 MILLIGRAM(S): 2 INJECTION INTRAMUSCULAR; INTRAVENOUS; SUBCUTANEOUS at 06:00

## 2022-04-12 RX ADMIN — INSULIN HUMAN 2 UNIT(S)/HR: 100 INJECTION, SOLUTION SUBCUTANEOUS at 02:32

## 2022-04-12 RX ADMIN — ESCITALOPRAM OXALATE 20 MILLIGRAM(S): 10 TABLET, FILM COATED ORAL at 12:33

## 2022-04-12 RX ADMIN — Medication 25 GRAM(S): at 06:00

## 2022-04-12 RX ADMIN — Medication 2 UNIT(S): at 17:00

## 2022-04-12 RX ADMIN — Medication 100 MILLIGRAM(S): at 16:56

## 2022-04-12 RX ADMIN — HYDROMORPHONE HYDROCHLORIDE 0.5 MILLIGRAM(S): 2 INJECTION INTRAMUSCULAR; INTRAVENOUS; SUBCUTANEOUS at 18:44

## 2022-04-12 RX ADMIN — HYDROMORPHONE HYDROCHLORIDE 0.5 MILLIGRAM(S): 2 INJECTION INTRAMUSCULAR; INTRAVENOUS; SUBCUTANEOUS at 06:15

## 2022-04-12 RX ADMIN — HYDROMORPHONE HYDROCHLORIDE 0.5 MILLIGRAM(S): 2 INJECTION INTRAMUSCULAR; INTRAVENOUS; SUBCUTANEOUS at 18:13

## 2022-04-12 RX ADMIN — Medication 2 UNIT(S): at 11:50

## 2022-04-12 RX ADMIN — Medication 1000 MILLIGRAM(S): at 10:44

## 2022-04-12 RX ADMIN — Medication 2 UNIT(S): at 07:57

## 2022-04-12 RX ADMIN — Medication 100 MILLIGRAM(S): at 09:03

## 2022-04-12 RX ADMIN — Medication 100 MILLIGRAM(S): at 23:53

## 2022-04-12 RX ADMIN — OXYCODONE HYDROCHLORIDE 10 MILLIGRAM(S): 5 TABLET ORAL at 17:03

## 2022-04-12 RX ADMIN — CHLORHEXIDINE GLUCONATE 1 APPLICATION(S): 213 SOLUTION TOPICAL at 11:44

## 2022-04-12 RX ADMIN — Medication 100 MILLIGRAM(S): at 00:52

## 2022-04-12 RX ADMIN — OXYCODONE HYDROCHLORIDE 10 MILLIGRAM(S): 5 TABLET ORAL at 12:44

## 2022-04-12 RX ADMIN — Medication 100 MILLIGRAM(S): at 23:52

## 2022-04-12 RX ADMIN — SENNA PLUS 2 TABLET(S): 8.6 TABLET ORAL at 22:40

## 2022-04-12 RX ADMIN — Medication 40 MILLIGRAM(S): at 22:43

## 2022-04-12 RX ADMIN — Medication 250 MILLIGRAM(S): at 20:27

## 2022-04-12 RX ADMIN — HYDROMORPHONE HYDROCHLORIDE 0.5 MILLIGRAM(S): 2 INJECTION INTRAMUSCULAR; INTRAVENOUS; SUBCUTANEOUS at 10:13

## 2022-04-12 RX ADMIN — CLOPIDOGREL BISULFATE 75 MILLIGRAM(S): 75 TABLET, FILM COATED ORAL at 06:34

## 2022-04-12 RX ADMIN — POLYETHYLENE GLYCOL 3350 17 GRAM(S): 17 POWDER, FOR SOLUTION ORAL at 11:43

## 2022-04-12 RX ADMIN — Medication 137 MICROGRAM(S): at 06:22

## 2022-04-12 RX ADMIN — HYDROMORPHONE HYDROCHLORIDE 0.5 MILLIGRAM(S): 2 INJECTION INTRAMUSCULAR; INTRAVENOUS; SUBCUTANEOUS at 11:03

## 2022-04-12 RX ADMIN — NICARDIPINE HYDROCHLORIDE 25 MG/HR: 30 CAPSULE, EXTENDED RELEASE ORAL at 03:53

## 2022-04-12 RX ADMIN — Medication 81 MILLIGRAM(S): at 11:44

## 2022-04-12 RX ADMIN — OXYCODONE HYDROCHLORIDE 10 MILLIGRAM(S): 5 TABLET ORAL at 17:33

## 2022-04-12 RX ADMIN — ATORVASTATIN CALCIUM 80 MILLIGRAM(S): 80 TABLET, FILM COATED ORAL at 22:40

## 2022-04-12 RX ADMIN — TAMSULOSIN HYDROCHLORIDE 0.8 MILLIGRAM(S): 0.4 CAPSULE ORAL at 22:40

## 2022-04-12 RX ADMIN — Medication 25 GRAM(S): at 22:42

## 2022-04-12 RX ADMIN — Medication 20 MILLIGRAM(S): at 12:36

## 2022-04-12 RX ADMIN — PANTOPRAZOLE SODIUM 40 MILLIGRAM(S): 20 TABLET, DELAYED RELEASE ORAL at 11:44

## 2022-04-12 NOTE — PROGRESS NOTE ADULT - ASSESSMENT
72M with PMHx of MI s/p stents (1983), CAD s/p multiple balloon angioplasties up to a few years ago, DM II,  HLD, HTN, thyroid cancer s/p thyroidectomy, cholecystitis s/p sepsis requiring cholecystectomy, PUD s/p gastric bypass, spinal stenosis, depression presented to the [facility] ED 4/8/22 c/o of chest pain, nausea, and regurgitation. Pt underwent cardiac catheterization at [facility] showing multivessel disease and was transferred to [facility].  Pt underwent ABG c3, LAD endarterectomy with svg patch.  Pt extubated, off pressors.  NAD.

## 2022-04-12 NOTE — PROGRESS NOTE ADULT - ASSESSMENT
72M with PMHx of MI s/p stents (1983), CAD s/p multiple balloon angioplasties up to a few years ago, DM II,  HLD, HTN, thyroid cancer s/p thyroidectomy, cholecystitis s/p sepsis requiring cholecystectomy,   PUD s/p gastric bypass, spinal stenosis, depression presented to the [facility] ED 4/8/22 c/o of chest pain, nausea, and regurgitation. Pt underwent cardiac catheterization at [facility] showing multivessel disease and was transferred to [facility] for CABG, s/p CABG 4/11/22.Endo consulted for DM and hypothyroidism.  T2DM x15 years, persisted despite bariatric surgery.  He reports good control with outpt A1c consistently around 7%.  He does not test his sugars.    He is currently taking Victoza 1.8 mg daily, MFN 1000 mg BID and Jardiance 10 mg daily at home.      T2DM complicated by CAD - A1c 7.5%  -On insulin gtt per CT surgery protocol, currently on insulin 3u/hr  -Will transition when appropriate,     Papillary thyroid cancer s/p Total thyroidectomy in 2018 with reported recurrence in upper chest area which he thinks is growing. TSH <0.01 and is at goal given thyroid cancer with mets to chest  - cont LT4 137 mcg daily  - tried calling Dr Barber, to get more info on the chest recurrence, but office closed  - check thyroglobulin and Tg ab  - check free T4 levels     CAD (coronary artery disease) p/p Cath with multivessel disease and in-stent stenosis  -s/p  CABG today   - continue ASA, Lipitor, and Lopressor as BP permits   72M with PMHx of MI s/p stents (1983), CAD s/p multiple balloon angioplasties up to a few years ago, DM II,  HLD, HTN, thyroid cancer s/p thyroidectomy, cholecystitis s/p sepsis requiring cholecystectomy,   PUD s/p gastric bypass, spinal stenosis, depression presented to the [place] ED 4/8/22 c/o of chest pain, nausea, and regurgitation. Pt underwent cardiac catheterization at [place] showing multivessel disease and was transferred to [facility] for CABG, s/p CABG 4/11/22.Endo consulted for DM and hypothyroidism.  T2DM x15 years, persisted despite bariatric surgery.  He reports good control with outpt A1c consistently around 7%.  He does not test his sugars.    He is currently taking Victoza 1.8 mg daily, MFN 1000 mg BID and Jardiance 10 mg daily at home.      T2DM complicated by CAD - A1c 7.5%  -On insulin gtt per CT surgery protocol, currently on insulin 3u/hr  -Will transition when appropriate  -diabetic diet.  -start postprandial coverage with admelog 4 units tid with meals, once starts eating      Papillary thyroid cancer s/p Total thyroidectomy in 2018 with reported recurrence in upper chest area which he thinks is growing. TSH <0.01 and is at goal given thyroid cancer with mets to chest  - cont LT4 137 mcg daily  - tried calling Dr Barber, to get more info on the chest recurrence, but office closed  - check thyroglobulin and Tg ab  - check free T4 levels     CAD (coronary artery disease) p/p Cath with multivessel disease and in-stent stenosis  -s/p  CABG today   - continue ASA, Lipitor, and Lopressor as BP permits

## 2022-04-12 NOTE — PROGRESS NOTE ADULT - SUBJECTIVE AND OBJECTIVE BOX
Patient seen and examined.  Denies CP, SOB, N/V. c/o incision pain worse with inspiration    T(C): 37.1 (04-12-22 @ 01:00)  T(F): 98.8 (04-12-22 @ 01:00)  HR: 93 (04-12-22 @ 01:30)  BP: 118/61 (04-11-22 @ 15:30)  BP(mean): 83 (04-11-22 @ 15:30)  ABP: 144/55 (04-12-22 @ 01:30)  ABP(mean): 77 (04-12-22 @ 01:30)  RR: 18 (04-12-22 @ 01:30)  SpO2: 96% (04-12-22 @ 01:30)  Wt(kg): --  CVP(mm Hg): 10 (04-12-22 @ 01:30)  CI: --  PA: --  PA(mean): --  PA(direct): --  SVRI: --  Mode: CPAP with PS, FiO2: 40, PEEP: 5, PS: 10, MAP: 9    Physical Exam:  Gen: A&Ox3  Pulm:  CTA b/l, no r/r/w splinting 2/2 pain   CV:  S1S2, RRR, no m/r/g  Abd: +BS, soft, NT, ND  Ext:  +DP b/l, no c/c/e ace L  Incision:  c/d/i no click, no exudate    I&O's Detail    10 Apr 2022 07:01  -  11 Apr 2022 07:00  --------------------------------------------------------  IN:    Oral Fluid: 960 mL  Total IN: 960 mL    OUT:  Total OUT: 0 mL    Total NET: 960 mL      11 Apr 2022 07:01  -  12 Apr 2022 01:44  --------------------------------------------------------  IN:    Insulin: 26 mL    IV PiggyBack: 100 mL    IV PiggyBack: 50 mL    IV PiggyBack: 100 mL    IV PiggyBack: 250 mL    IV PiggyBack: 250 mL    Lactated Ringers Bolus: 2000 mL    NiCARdipine: 125 mL    Oral Fluid: 150 mL    Propofol: 48 mL    sodium chloride 0.9%: 70 mL    sodium chloride 0.9%: 140 mL  Total IN: 3309 mL    OUT:    Chest Tube (mL): 150 mL    Chest Tube (mL): 530 mL    Indwelling Catheter - Urethral (mL): 2405 mL  Total OUT: 3085 mL    Total NET: 224 mL                              10.5   12.91 )-----------( 77       ( 11 Apr 2022 12:39 )             30.8   04-11    138  |  103  |  19.0  ----------------------------<  157<H>  4.0   |  24.0  |  0.50    Ca    8.1<L>      11 Apr 2022 12:39  Mg     1.8     04-11    TPro  4.0<L>  /  Alb  2.7<L>  /  TBili  0.6  /  DBili  x   /  AST  53<H>  /  ALT  31  /  AlkPhos  29<L>  04-11  aPTT: 29.1 sec; PT: 16.7 sec; INR: 1.43 ratio  04-11-22 @ 12:39       ABG - ( 11 Apr 2022 20:56 )  pH: 7.420 /  pCO2: 42    /  pO2: 91    / HCO3: 27    / Base Excess: 2.7   /  SaO2: 96.9 /  Lactate: x        CAPILLARY BLOOD GLUCOSE      POCT Blood Glucose.: 160 mg/dL (12 Apr 2022 00:55)        Medications:  aspirin  chewable 81 milliGRAM(s) Oral daily  atorvastatin 80 milliGRAM(s) Oral at bedtime  cefuroxime  IVPB 1500 milliGRAM(s) IV Intermittent every 8 hours  chlorhexidine 2% Cloths 1 Application(s) Topical daily  clopidogrel Tablet 75 milliGRAM(s) Oral daily  dextrose 50% Injectable 50 milliLiter(s) IV Push every 15 minutes  dextrose 50% Injectable 25 milliLiter(s) IV Push every 15 minutes  escitalopram 20 milliGRAM(s) Oral daily  insulin regular Infusion 2 Unit(s)/Hr IV Continuous <Continuous>  levothyroxine 137 MICROGram(s) Oral daily  niCARdipine Infusion 5 mG/Hr IV Continuous <Continuous>  pantoprazole    Tablet 40 milliGRAM(s) Oral daily  polyethylene glycol 3350 17 Gram(s) Oral daily  senna 2 Tablet(s) Oral at bedtime  sodium chloride 0.9%. 1000 milliLiter(s) IV Continuous <Continuous>  sodium chloride 0.9%. 1000 milliLiter(s) IV Continuous <Continuous>  tamsulosin 0.8 milliGRAM(s) Oral at bedtime  vancomycin  IVPB 1000 milliGRAM(s) IV Intermittent every 12 hours      CXR: pending

## 2022-04-12 NOTE — PHYSICAL THERAPY INITIAL EVALUATION ADULT - ADDITIONAL COMMENTS
Pt. states he lives in a private home with 1 step to enter and full flight to bedroom with handrail.    Wife will be home to assist as needed.

## 2022-04-12 NOTE — PROGRESS NOTE ADULT - SUBJECTIVE AND OBJECTIVE BOX
INTERVAL HPI/OVERNIGHT EVENTS:  now extubates, off pressors.    MEDICATIONS  (STANDING):  acetaminophen   IVPB .. 1000 milliGRAM(s) IV Intermittent once  aspirin  chewable 81 milliGRAM(s) Oral once  atorvastatin 80 milliGRAM(s) Oral at bedtime  cefuroxime  IVPB 1500 milliGRAM(s) IV Intermittent every 8 hours  chlorhexidine 0.12% Liquid 15 milliLiter(s) Oral Mucosa every 12 hours  chlorhexidine 2% Cloths 1 Application(s) Topical daily  dextrose 50% Injectable 50 milliLiter(s) IV Push every 15 minutes  dextrose 50% Injectable 25 milliLiter(s) IV Push every 15 minutes  insulin regular Infusion 2 Unit(s)/Hr (2 mL/Hr) IV Continuous <Continuous>  levothyroxine 137 MICROGram(s) Oral daily  magnesium sulfate  IVPB 2 Gram(s) IV Intermittent once  niCARdipine Infusion 5 mG/Hr (25 mL/Hr) IV Continuous <Continuous>  pantoprazole  Injectable 40 milliGRAM(s) IV Push once  potassium chloride  10 mEq/50 mL IVPB 10 milliEquivalent(s) IV Intermittent every 1 hour  potassium chloride  10 mEq/50 mL IVPB 10 milliEquivalent(s) IV Intermittent every 1 hour  potassium chloride  10 mEq/50 mL IVPB 10 milliEquivalent(s) IV Intermittent every 1 hour  potassium chloride  10 mEq/50 mL IVPB 10 milliEquivalent(s) IV Intermittent every 1 hour  propofol Infusion 10 MICROgram(s)/kG/Min (4.08 mL/Hr) IV Continuous <Continuous>  senna 2 Tablet(s) Oral at bedtime  sodium chloride 0.9%. 1000 milliLiter(s) (10 mL/Hr) IV Continuous <Continuous>  sodium chloride 0.9%. 1000 milliLiter(s) (5 mL/Hr) IV Continuous <Continuous>  tamsulosin 0.8 milliGRAM(s) Oral at bedtime  vancomycin  IVPB 1000 milliGRAM(s) IV Intermittent every 12 hours    MEDICATIONS  (PRN):      Allergies    No Known Allergies    Intolerances    ACE inhibitors (Other)  levofloxacin (Joint Pain)      Review of systems:  NOT DONE, patient intubated    Vital Signs Last 24 Hrs  T(C): 35.2 (11 Apr 2022 13:00), Max: 37.1 (10 Apr 2022 15:30)  T(F): 95.4 (11 Apr 2022 13:00), Max: 98.8 (10 Apr 2022 15:30)  HR: 58 (11 Apr 2022 13:15) (57 - 69)  BP: 164/86 (11 Apr 2022 05:59) (134/73 - 169/84)  BP(mean): --  RR: 10 (11 Apr 2022 13:15) (10 - 18)  SpO2: 100% (11 Apr 2022 13:15) (94% - 100%)    PHYSICAL EXAM:  Constitutional: intubated, well-groomed, well-developed  Respiratory: CTAB  Cardiovascular: S1 and S2, RRR, no M/G/R  Gastrointestinal: BS+, soft, no organomegaly or mass  Extremities: No peripheral edema, no pedal lesions  Skin: No rashes, no acanthosis        LABS:                        10.5   12.91 )-----------( 77       ( 11 Apr 2022 12:39 )             30.8     04-11    138  |  103  |  19.0  ----------------------------<  157<H>  4.0   |  24.0  |  0.50    Ca    8.1<L>      11 Apr 2022 12:39  Mg     1.8     04-11    TPro  4.0<L>  /  Alb  2.7<L>  /  TBili  0.6  /  DBili  x   /  AST  53<H>  /  ALT  31  /  AlkPhos  29<L>  04-11          CAPILLARY BLOOD GLUCOSE      RADIOLOGY & ADDITIONAL TESTS:       INTERVAL HPI/OVERNIGHT EVENTS:  now extubates, off pressors. sitting comfortaby in chair.    MEDICATIONS  (STANDING):  acetaminophen   IVPB .. 1000 milliGRAM(s) IV Intermittent once  aspirin  chewable 81 milliGRAM(s) Oral once  atorvastatin 80 milliGRAM(s) Oral at bedtime  cefuroxime  IVPB 1500 milliGRAM(s) IV Intermittent every 8 hours  chlorhexidine 0.12% Liquid 15 milliLiter(s) Oral Mucosa every 12 hours  chlorhexidine 2% Cloths 1 Application(s) Topical daily  dextrose 50% Injectable 50 milliLiter(s) IV Push every 15 minutes  dextrose 50% Injectable 25 milliLiter(s) IV Push every 15 minutes  insulin regular Infusion 2 Unit(s)/Hr (2 mL/Hr) IV Continuous <Continuous>  levothyroxine 137 MICROGram(s) Oral daily  magnesium sulfate  IVPB 2 Gram(s) IV Intermittent once  niCARdipine Infusion 5 mG/Hr (25 mL/Hr) IV Continuous <Continuous>  pantoprazole  Injectable 40 milliGRAM(s) IV Push once  potassium chloride  10 mEq/50 mL IVPB 10 milliEquivalent(s) IV Intermittent every 1 hour  potassium chloride  10 mEq/50 mL IVPB 10 milliEquivalent(s) IV Intermittent every 1 hour  potassium chloride  10 mEq/50 mL IVPB 10 milliEquivalent(s) IV Intermittent every 1 hour  potassium chloride  10 mEq/50 mL IVPB 10 milliEquivalent(s) IV Intermittent every 1 hour  propofol Infusion 10 MICROgram(s)/kG/Min (4.08 mL/Hr) IV Continuous <Continuous>  senna 2 Tablet(s) Oral at bedtime  sodium chloride 0.9%. 1000 milliLiter(s) (10 mL/Hr) IV Continuous <Continuous>  sodium chloride 0.9%. 1000 milliLiter(s) (5 mL/Hr) IV Continuous <Continuous>  tamsulosin 0.8 milliGRAM(s) Oral at bedtime  vancomycin  IVPB 1000 milliGRAM(s) IV Intermittent every 12 hours    MEDICATIONS  (PRN):      Allergies    No Known Allergies    Intolerances    ACE inhibitors (Other)  levofloxacin (Joint Pain)      Review of systems:  NOT DONE, patient intubated    Vital Signs Last 24 Hrs  T(C): 35.2 (11 Apr 2022 13:00), Max: 37.1 (10 Apr 2022 15:30)  T(F): 95.4 (11 Apr 2022 13:00), Max: 98.8 (10 Apr 2022 15:30)  HR: 58 (11 Apr 2022 13:15) (57 - 69)  BP: 164/86 (11 Apr 2022 05:59) (134/73 - 169/84)  BP(mean): --  RR: 10 (11 Apr 2022 13:15) (10 - 18)  SpO2: 100% (11 Apr 2022 13:15) (94% - 100%)    PHYSICAL EXAM:  Constitutional: well-groomed, well-developed  Respiratory: CTAB  Cardiovascular: S1 and S2, RRR, no M/G/R  Gastrointestinal: BS+, soft, no organomegaly or mass  Extremities: No peripheral edema, no pedal lesions  Skin: No rashes, no acanthosis        LABS:                        10.5   12.91 )-----------( 77       ( 11 Apr 2022 12:39 )             30.8     04-11    138  |  103  |  19.0  ----------------------------<  157<H>  4.0   |  24.0  |  0.50    Ca    8.1<L>      11 Apr 2022 12:39  Mg     1.8     04-11    TPro  4.0<L>  /  Alb  2.7<L>  /  TBili  0.6  /  DBili  x   /  AST  53<H>  /  ALT  31  /  AlkPhos  29<L>  04-11          CAPILLARY BLOOD GLUCOSE      RADIOLOGY & ADDITIONAL TESTS:

## 2022-04-13 LAB
ALBUMIN SERPL ELPH-MCNC: 3 G/DL — LOW (ref 3.3–5.2)
ALP SERPL-CCNC: 51 U/L — SIGNIFICANT CHANGE UP (ref 40–120)
ALT FLD-CCNC: 48 U/L — HIGH
ANION GAP SERPL CALC-SCNC: 14 MMOL/L — SIGNIFICANT CHANGE UP (ref 5–17)
AST SERPL-CCNC: 47 U/L — HIGH
BILIRUB DIRECT SERPL-MCNC: 0.2 MG/DL — SIGNIFICANT CHANGE UP (ref 0–0.3)
BILIRUB INDIRECT FLD-MCNC: 0.3 MG/DL — SIGNIFICANT CHANGE UP (ref 0.2–1)
BILIRUB SERPL-MCNC: 0.5 MG/DL — SIGNIFICANT CHANGE UP (ref 0.4–2)
BUN SERPL-MCNC: 16.4 MG/DL — SIGNIFICANT CHANGE UP (ref 8–20)
CALCIUM SERPL-MCNC: 8.9 MG/DL — SIGNIFICANT CHANGE UP (ref 8.6–10.2)
CHLORIDE SERPL-SCNC: 98 MMOL/L — SIGNIFICANT CHANGE UP (ref 98–107)
CO2 SERPL-SCNC: 22 MMOL/L — SIGNIFICANT CHANGE UP (ref 22–29)
CREAT SERPL-MCNC: 0.61 MG/DL — SIGNIFICANT CHANGE UP (ref 0.5–1.3)
EGFR: 102 ML/MIN/1.73M2 — SIGNIFICANT CHANGE UP
GLUCOSE BLDC GLUCOMTR-MCNC: 112 MG/DL — HIGH (ref 70–99)
GLUCOSE BLDC GLUCOMTR-MCNC: 119 MG/DL — HIGH (ref 70–99)
GLUCOSE BLDC GLUCOMTR-MCNC: 201 MG/DL — HIGH (ref 70–99)
GLUCOSE BLDC GLUCOMTR-MCNC: 259 MG/DL — HIGH (ref 70–99)
GLUCOSE BLDC GLUCOMTR-MCNC: 289 MG/DL — HIGH (ref 70–99)
GLUCOSE BLDC GLUCOMTR-MCNC: 90 MG/DL — SIGNIFICANT CHANGE UP (ref 70–99)
GLUCOSE SERPL-MCNC: 115 MG/DL — HIGH (ref 70–99)
HCT VFR BLD CALC: 33 % — LOW (ref 39–50)
HGB BLD-MCNC: 10.7 G/DL — LOW (ref 13–17)
MAGNESIUM SERPL-MCNC: 2.5 MG/DL — SIGNIFICANT CHANGE UP (ref 1.6–2.6)
MCHC RBC-ENTMCNC: 26 PG — LOW (ref 27–34)
MCHC RBC-ENTMCNC: 32.4 GM/DL — SIGNIFICANT CHANGE UP (ref 32–36)
MCV RBC AUTO: 80.3 FL — SIGNIFICANT CHANGE UP (ref 80–100)
PLATELET # BLD AUTO: 96 K/UL — LOW (ref 150–400)
POTASSIUM SERPL-MCNC: 3.9 MMOL/L — SIGNIFICANT CHANGE UP (ref 3.5–5.3)
POTASSIUM SERPL-SCNC: 3.9 MMOL/L — SIGNIFICANT CHANGE UP (ref 3.5–5.3)
PROT SERPL-MCNC: 5.1 G/DL — LOW (ref 6.6–8.7)
RBC # BLD: 4.11 M/UL — LOW (ref 4.2–5.8)
RBC # FLD: 19.5 % — HIGH (ref 10.3–14.5)
SODIUM SERPL-SCNC: 134 MMOL/L — LOW (ref 135–145)
T3 SERPL-MCNC: 51 NG/DL — LOW (ref 80–200)
T4 AB SER-ACNC: 6.2 UG/DL — SIGNIFICANT CHANGE UP (ref 4.5–12)
T4 FREE SERPL-MCNC: 1.5 NG/DL — SIGNIFICANT CHANGE UP (ref 0.9–1.8)
THYROGLOB AB FLD IA-ACNC: <20 IU/ML — SIGNIFICANT CHANGE UP
THYROGLOB AB SERPL-ACNC: <20 IU/ML — SIGNIFICANT CHANGE UP
TSH SERPL-MCNC: <0.1 UIU/ML — LOW (ref 0.27–4.2)
WBC # BLD: 13.63 K/UL — HIGH (ref 3.8–10.5)
WBC # FLD AUTO: 13.63 K/UL — HIGH (ref 3.8–10.5)

## 2022-04-13 PROCEDURE — 71045 X-RAY EXAM CHEST 1 VIEW: CPT | Mod: 26,76

## 2022-04-13 PROCEDURE — 99232 SBSQ HOSP IP/OBS MODERATE 35: CPT

## 2022-04-13 PROCEDURE — 93010 ELECTROCARDIOGRAM REPORT: CPT

## 2022-04-13 PROCEDURE — 99024 POSTOP FOLLOW-UP VISIT: CPT

## 2022-04-13 RX ORDER — AMIODARONE HYDROCHLORIDE 400 MG/1
200 TABLET ORAL DAILY
Refills: 0 | Status: CANCELLED | OUTPATIENT
Start: 2022-04-17 | End: 2022-04-16

## 2022-04-13 RX ORDER — MAGNESIUM SULFATE 500 MG/ML
2 VIAL (ML) INJECTION ONCE
Refills: 0 | Status: COMPLETED | OUTPATIENT
Start: 2022-04-13 | End: 2022-04-13

## 2022-04-13 RX ORDER — POTASSIUM CHLORIDE 20 MEQ
10 PACKET (EA) ORAL ONCE
Refills: 0 | Status: COMPLETED | OUTPATIENT
Start: 2022-04-13 | End: 2022-04-13

## 2022-04-13 RX ORDER — AMIODARONE HYDROCHLORIDE 400 MG/1
400 TABLET ORAL EVERY 8 HOURS
Refills: 0 | Status: DISCONTINUED | OUTPATIENT
Start: 2022-04-13 | End: 2022-04-16

## 2022-04-13 RX ORDER — INSULIN LISPRO 100/ML
6 VIAL (ML) SUBCUTANEOUS
Refills: 0 | Status: DISCONTINUED | OUTPATIENT
Start: 2022-04-13 | End: 2022-04-16

## 2022-04-13 RX ORDER — AMIODARONE HYDROCHLORIDE 400 MG/1
TABLET ORAL
Refills: 0 | Status: DISCONTINUED | OUTPATIENT
Start: 2022-04-13 | End: 2022-04-16

## 2022-04-13 RX ORDER — SODIUM CHLORIDE 9 MG/ML
3 INJECTION INTRAMUSCULAR; INTRAVENOUS; SUBCUTANEOUS EVERY 8 HOURS
Refills: 0 | Status: DISCONTINUED | OUTPATIENT
Start: 2022-04-13 | End: 2022-04-16

## 2022-04-13 RX ADMIN — Medication 40 MILLIEQUIVALENT(S): at 11:00

## 2022-04-13 RX ADMIN — Medication 6: at 17:26

## 2022-04-13 RX ADMIN — Medication 25 MILLIGRAM(S): at 17:27

## 2022-04-13 RX ADMIN — AMIODARONE HYDROCHLORIDE 400 MILLIGRAM(S): 400 TABLET ORAL at 06:12

## 2022-04-13 RX ADMIN — Medication 250 MILLIGRAM(S): at 09:16

## 2022-04-13 RX ADMIN — Medication 40 MILLIGRAM(S): at 06:12

## 2022-04-13 RX ADMIN — HYDROMORPHONE HYDROCHLORIDE 0.5 MILLIGRAM(S): 2 INJECTION INTRAMUSCULAR; INTRAVENOUS; SUBCUTANEOUS at 09:29

## 2022-04-13 RX ADMIN — ESCITALOPRAM OXALATE 20 MILLIGRAM(S): 10 TABLET, FILM COATED ORAL at 11:00

## 2022-04-13 RX ADMIN — Medication 4 UNIT(S): at 11:01

## 2022-04-13 RX ADMIN — SODIUM CHLORIDE 3 MILLILITER(S): 9 INJECTION INTRAMUSCULAR; INTRAVENOUS; SUBCUTANEOUS at 14:00

## 2022-04-13 RX ADMIN — SODIUM CHLORIDE 3 MILLILITER(S): 9 INJECTION INTRAMUSCULAR; INTRAVENOUS; SUBCUTANEOUS at 21:16

## 2022-04-13 RX ADMIN — OXYCODONE HYDROCHLORIDE 5 MILLIGRAM(S): 5 TABLET ORAL at 08:25

## 2022-04-13 RX ADMIN — Medication 4 UNIT(S): at 17:26

## 2022-04-13 RX ADMIN — INSULIN GLARGINE 16 UNIT(S): 100 INJECTION, SOLUTION SUBCUTANEOUS at 21:12

## 2022-04-13 RX ADMIN — PANTOPRAZOLE SODIUM 40 MILLIGRAM(S): 20 TABLET, DELAYED RELEASE ORAL at 11:00

## 2022-04-13 RX ADMIN — HYDROMORPHONE HYDROCHLORIDE 0.5 MILLIGRAM(S): 2 INJECTION INTRAMUSCULAR; INTRAVENOUS; SUBCUTANEOUS at 10:00

## 2022-04-13 RX ADMIN — Medication 25 GRAM(S): at 04:31

## 2022-04-13 RX ADMIN — Medication 50 MILLIEQUIVALENT(S): at 04:31

## 2022-04-13 RX ADMIN — Medication 6: at 21:13

## 2022-04-13 RX ADMIN — ATORVASTATIN CALCIUM 80 MILLIGRAM(S): 80 TABLET, FILM COATED ORAL at 21:11

## 2022-04-13 RX ADMIN — OXYCODONE HYDROCHLORIDE 5 MILLIGRAM(S): 5 TABLET ORAL at 07:46

## 2022-04-13 RX ADMIN — CLOPIDOGREL BISULFATE 75 MILLIGRAM(S): 75 TABLET, FILM COATED ORAL at 11:00

## 2022-04-13 RX ADMIN — SENNA PLUS 2 TABLET(S): 8.6 TABLET ORAL at 21:12

## 2022-04-13 RX ADMIN — Medication 100 MILLIGRAM(S): at 07:46

## 2022-04-13 RX ADMIN — TAMSULOSIN HYDROCHLORIDE 0.8 MILLIGRAM(S): 0.4 CAPSULE ORAL at 21:12

## 2022-04-13 RX ADMIN — Medication 137 MICROGRAM(S): at 06:12

## 2022-04-13 RX ADMIN — AMIODARONE HYDROCHLORIDE 400 MILLIGRAM(S): 400 TABLET ORAL at 13:17

## 2022-04-13 RX ADMIN — Medication 4: at 11:01

## 2022-04-13 RX ADMIN — AMIODARONE HYDROCHLORIDE 400 MILLIGRAM(S): 400 TABLET ORAL at 21:11

## 2022-04-13 RX ADMIN — Medication 25 MILLIGRAM(S): at 06:12

## 2022-04-13 RX ADMIN — OXYCODONE HYDROCHLORIDE 10 MILLIGRAM(S): 5 TABLET ORAL at 15:13

## 2022-04-13 RX ADMIN — Medication 81 MILLIGRAM(S): at 11:00

## 2022-04-13 RX ADMIN — OXYCODONE HYDROCHLORIDE 10 MILLIGRAM(S): 5 TABLET ORAL at 16:15

## 2022-04-13 RX ADMIN — ENOXAPARIN SODIUM 40 MILLIGRAM(S): 100 INJECTION SUBCUTANEOUS at 11:00

## 2022-04-13 RX ADMIN — Medication 100 MILLIGRAM(S): at 21:12

## 2022-04-13 RX ADMIN — POLYETHYLENE GLYCOL 3350 17 GRAM(S): 17 POWDER, FOR SOLUTION ORAL at 11:00

## 2022-04-13 RX ADMIN — Medication 4 UNIT(S): at 07:46

## 2022-04-13 NOTE — PROGRESS NOTE ADULT - SUBJECTIVE AND OBJECTIVE BOX
INTERVAL HPI/OVERNIGHT EVENTS:  now extubates, off pressors. sitting comfortaby in chair.    MEDICATIONS  (STANDING):  acetaminophen   IVPB .. 1000 milliGRAM(s) IV Intermittent once  aspirin  chewable 81 milliGRAM(s) Oral once  atorvastatin 80 milliGRAM(s) Oral at bedtime  cefuroxime  IVPB 1500 milliGRAM(s) IV Intermittent every 8 hours  chlorhexidine 0.12% Liquid 15 milliLiter(s) Oral Mucosa every 12 hours  chlorhexidine 2% Cloths 1 Application(s) Topical daily  dextrose 50% Injectable 50 milliLiter(s) IV Push every 15 minutes  dextrose 50% Injectable 25 milliLiter(s) IV Push every 15 minutes  insulin regular Infusion 2 Unit(s)/Hr (2 mL/Hr) IV Continuous <Continuous>  levothyroxine 137 MICROGram(s) Oral daily  magnesium sulfate  IVPB 2 Gram(s) IV Intermittent once  niCARdipine Infusion 5 mG/Hr (25 mL/Hr) IV Continuous <Continuous>  pantoprazole  Injectable 40 milliGRAM(s) IV Push once  potassium chloride  10 mEq/50 mL IVPB 10 milliEquivalent(s) IV Intermittent every 1 hour  potassium chloride  10 mEq/50 mL IVPB 10 milliEquivalent(s) IV Intermittent every 1 hour  potassium chloride  10 mEq/50 mL IVPB 10 milliEquivalent(s) IV Intermittent every 1 hour  potassium chloride  10 mEq/50 mL IVPB 10 milliEquivalent(s) IV Intermittent every 1 hour  propofol Infusion 10 MICROgram(s)/kG/Min (4.08 mL/Hr) IV Continuous <Continuous>  senna 2 Tablet(s) Oral at bedtime  sodium chloride 0.9%. 1000 milliLiter(s) (10 mL/Hr) IV Continuous <Continuous>  sodium chloride 0.9%. 1000 milliLiter(s) (5 mL/Hr) IV Continuous <Continuous>  tamsulosin 0.8 milliGRAM(s) Oral at bedtime  vancomycin  IVPB 1000 milliGRAM(s) IV Intermittent every 12 hours    MEDICATIONS  (PRN):      Allergies    No Known Allergies    Intolerances    ACE inhibitors (Other)  levofloxacin (Joint Pain)      Review of systems:  NOT DONE, patient intubated    Vital Signs Last 24 Hrs  T(C): 35.2 (11 Apr 2022 13:00), Max: 37.1 (10 Apr 2022 15:30)  T(F): 95.4 (11 Apr 2022 13:00), Max: 98.8 (10 Apr 2022 15:30)  HR: 58 (11 Apr 2022 13:15) (57 - 69)  BP: 164/86 (11 Apr 2022 05:59) (134/73 - 169/84)  BP(mean): --  RR: 10 (11 Apr 2022 13:15) (10 - 18)  SpO2: 100% (11 Apr 2022 13:15) (94% - 100%)    PHYSICAL EXAM:  Constitutional: well-groomed, well-developed  Respiratory: CTAB  Cardiovascular: S1 and S2, RRR, no M/G/R  Gastrointestinal: BS+, soft, no organomegaly or mass  Extremities: No peripheral edema, no pedal lesions  Skin: No rashes, no acanthosis        LABS:                        10.5   12.91 )-----------( 77       ( 11 Apr 2022 12:39 )             30.8     04-11    138  |  103  |  19.0  ----------------------------<  157<H>  4.0   |  24.0  |  0.50    Ca    8.1<L>      11 Apr 2022 12:39  Mg     1.8     04-11    TPro  4.0<L>  /  Alb  2.7<L>  /  TBili  0.6  /  DBili  x   /  AST  53<H>  /  ALT  31  /  AlkPhos  29<L>  04-11          CAPILLARY BLOOD GLUCOSE      RADIOLOGY & ADDITIONAL TESTS:       INTERVAL HPI/OVERNIGHT EVENTS:   sitting comfortaby in chair.    MEDICATIONS  (STANDING):  acetaminophen   IVPB .. 1000 milliGRAM(s) IV Intermittent once  aspirin  chewable 81 milliGRAM(s) Oral once  atorvastatin 80 milliGRAM(s) Oral at bedtime  cefuroxime  IVPB 1500 milliGRAM(s) IV Intermittent every 8 hours  chlorhexidine 0.12% Liquid 15 milliLiter(s) Oral Mucosa every 12 hours  chlorhexidine 2% Cloths 1 Application(s) Topical daily  dextrose 50% Injectable 50 milliLiter(s) IV Push every 15 minutes  dextrose 50% Injectable 25 milliLiter(s) IV Push every 15 minutes  insulin regular Infusion 2 Unit(s)/Hr (2 mL/Hr) IV Continuous <Continuous>  levothyroxine 137 MICROGram(s) Oral daily  magnesium sulfate  IVPB 2 Gram(s) IV Intermittent once  niCARdipine Infusion 5 mG/Hr (25 mL/Hr) IV Continuous <Continuous>  pantoprazole  Injectable 40 milliGRAM(s) IV Push once  potassium chloride  10 mEq/50 mL IVPB 10 milliEquivalent(s) IV Intermittent every 1 hour  potassium chloride  10 mEq/50 mL IVPB 10 milliEquivalent(s) IV Intermittent every 1 hour  potassium chloride  10 mEq/50 mL IVPB 10 milliEquivalent(s) IV Intermittent every 1 hour  potassium chloride  10 mEq/50 mL IVPB 10 milliEquivalent(s) IV Intermittent every 1 hour  propofol Infusion 10 MICROgram(s)/kG/Min (4.08 mL/Hr) IV Continuous <Continuous>  senna 2 Tablet(s) Oral at bedtime  sodium chloride 0.9%. 1000 milliLiter(s) (10 mL/Hr) IV Continuous <Continuous>  sodium chloride 0.9%. 1000 milliLiter(s) (5 mL/Hr) IV Continuous <Continuous>  tamsulosin 0.8 milliGRAM(s) Oral at bedtime  vancomycin  IVPB 1000 milliGRAM(s) IV Intermittent every 12 hours    MEDICATIONS  (PRN):      Allergies    No Known Allergies    Intolerances    ACE inhibitors (Other)  levofloxacin (Joint Pain)      Review of systems:  no cp, no sob, no n/v, some mild chest tenderness    Vital Signs Last 24 Hrs  T(C): 35.2 (11 Apr 2022 13:00), Max: 37.1 (10 Apr 2022 15:30)  T(F): 95.4 (11 Apr 2022 13:00), Max: 98.8 (10 Apr 2022 15:30)  HR: 58 (11 Apr 2022 13:15) (57 - 69)  BP: 164/86 (11 Apr 2022 05:59) (134/73 - 169/84)  BP(mean): --  RR: 10 (11 Apr 2022 13:15) (10 - 18)  SpO2: 100% (11 Apr 2022 13:15) (94% - 100%)    PHYSICAL EXAM:  Constitutional: well-groomed, well-developed  Respiratory: CTAB  Cardiovascular: S1 and S2, RRR, no M/G/R  Gastrointestinal: BS+, soft, no organomegaly or mass  Extremities: No peripheral edema, no pedal lesions  Skin: No rashes, no acanthosis        LABS:                        10.5   12.91 )-----------( 77       ( 11 Apr 2022 12:39 )             30.8     04-11    138  |  103  |  19.0  ----------------------------<  157<H>  4.0   |  24.0  |  0.50    Ca    8.1<L>      11 Apr 2022 12:39  Mg     1.8     04-11    TPro  4.0<L>  /  Alb  2.7<L>  /  TBili  0.6  /  DBili  x   /  AST  53<H>  /  ALT  31  /  AlkPhos  29<L>  04-11

## 2022-04-13 NOTE — DIETITIAN INITIAL EVALUATION ADULT - NSFNSGIIOFT_GEN_A_CORE
04-12-22 @ 07:01  -  04-13-22 @ 07:00  --------------------------------------------------------  OUT:    Chest Tube (mL): 40 mL    Chest Tube (mL): 250 mL  Total OUT: 290 mL    Total NET: -290 mL      04-13-22 @ 07:01 - 04-13-22 @ 10:51  --------------------------------------------------------  OUT:    Chest Tube (mL): 0 mL    Chest Tube (mL): 40 mL  Total OUT: 40 mL    Total NET: -40 mL

## 2022-04-13 NOTE — DIETITIAN INITIAL EVALUATION ADULT - PERTINENT LABORATORY DATA
04-13    134<L>  |  98  |  16.4  ----------------------------<  115<H>  3.9   |  22.0  |  0.61    Ca    8.9      13 Apr 2022 03:53  Mg     2.5     04-13    TPro  5.1<L>  /  Alb  3.0<L>  /  TBili  0.5  /  DBili  0.2  /  AST  47<H>  /  ALT  48<H>  /  AlkPhos  51  04-13  POCT Blood Glucose.: 112 mg/dL (04-13-22 @ 07:33)  A1C with Estimated Average Glucose Result: 7.5 % (04-09-22 @ 02:58)

## 2022-04-13 NOTE — DIETITIAN INITIAL EVALUATION ADULT - OTHER INFO
Pt is a 72 year old M with PMHx of MI s/p stents (1983), CAD s/p multiple balloon angioplasties up to a few years ago, DM II,  HLD, HTN, thyroid cancer s/p thyroidectomy, cholecystitis s/p sepsis requiring cholecystectomy, PUD s/p gastric bypass, spinal stenosis, depression presented to the [facility] ED 4/8/22 c/o of chest pain, nausea, and regurgitation. Pt underwent cardiac catheterization at [facility] showing multivessel disease and was transferred to [facility].  Pt underwent CABG x3, LAD endarterectomy with svg patch.  Pt extubated, off pressors.

## 2022-04-13 NOTE — DIETITIAN INITIAL EVALUATION ADULT - ORAL INTAKE PTA/DIET HISTORY
Pt reports eating well PTA, however pt's wife at bedside reports pt has been eating very little. Pt reports history of gastric bypass in 2010 and since has lost 70lbs; however over past 1-2 weeks; pt and wife endorse a 5lb unintentional weight loss. Pt denies difficulty swallowing post op.

## 2022-04-13 NOTE — PROGRESS NOTE ADULT - SUBJECTIVE AND OBJECTIVE BOX
5 SUBJECTIVE   without acute complaints overnight -   pain adequately controleld at this time     INTERIM HISTORY SIGNIFICANT FOR   brief episode of afib overnight - started on amio     Patient is a 72y old  Male who presents with a chief complaint of Transfer from [facility] for CABG evaluation (12 Apr 2022 12:33)    HPI:  72M with PMHx of CAD s/p multiple stents and balloon angioplasties, MI (1983), DM II,  HLD, HTN, thyroid cancer s/p thyroidectomy, cholecystitis s/p sepsis requiring cholecystectomy, PUD s/p gastric bypass, spinal stenosis, depression was transferred from [facility] for a CABG evaluation.  Presented to the [facility] ED this morning 4/8/22 c/o of chest pain, nausea, and regurgitation. Pt states that chest pain began last night with no relief from 2 tabs of NTG or tums. Also admits to weakness associated with symptoms last night. Pt endorsed fatigue for several months, saying that he becomes tired by 3pm with no attributable cause.  Pt underwent cardiac catheterization, showing multivessel disease. Denies radiation of pain, HA, SOB.  (08 Apr 2022 17:47)    OBJECTIVE  PAST MEDICAL & SURGICAL HISTORY:  Type 2 diabetes mellitus    HTN (hypertension)    HLD (hyperlipidemia)    Thyroid cancer    CAD (coronary artery disease)    Myocardial infarction    Spinal stenosis    OA (osteoarthritis) of knee    Depression, major    H/O cholecystitis    S/P gastric bypass  complicated by PUD    H/O thyroidectomy    S/P cholecystectomy    S/P hernia repair      ACE inhibitors (Other)  levofloxacin (Joint Pain)  No Known Allergies    Home Medications:  alfuzosin 10 mg oral tablet, extended release: 1 tab(s) orally once a day (08 Apr 2022 18:57)  Ambien:  (08 Apr 2022 18:57)  Aspirin Enteric Coated 81 mg oral delayed release tablet: 1 tab(s) orally once a day (08 Apr 2022 18:57)  empagliflozin 10 mg oral tablet: 1 tab(s) orally once a day (in the morning) (08 Apr 2022 18:57)  escitalopram 20 mg oral tablet: 1 tab(s) orally once a day (08 Apr 2022 18:57)  famotidine 20 mg oral tablet: 1 tab(s) orally 2 times a day (08 Apr 2022 18:57)  imiquimod 5% topical cream: Apply topically to affected area 3 times a week (08 Apr 2022 18:57)  levothyroxine 137 mcg (0.137 mg) oral tablet: 1 tab(s) orally once a day (08 Apr 2022 18:57)  losartan-hydrochlorothiazide 100 mg-25 mg oral tablet: 1 tab(s) orally once a day (08 Apr 2022 18:57)  magnesium chloride:  (08 Apr 2022 18:57)  metFORMIN 500 mg oral tablet, extended release: 2 tab(s) orally 2 times a day (08 Apr 2022 18:57)  Nebivolol 20 mg oral tablet: 1 tab(s) orally once a day (08 Apr 2022 18:57)  nitroglycerin 0.4 mg sublingual tablet: 1 tab(s) sublingual every 5 minutes (08 Apr 2022 18:57)  rosuvastatin 10 mg oral tablet: 1 tab(s) orally once a day (at bedtime) (08 Apr 2022 18:57)  sucralfate 1 g oral tablet: 1 tab(s) orally 4 times a day (before meals and at bedtime) (08 Apr 2022 18:57)  traZODone 100 mg oral tablet: 1 tab(s) orally once a day (at bedtime) (08 Apr 2022 18:57)  Turmeric:  (08 Apr 2022 18:57)  Victoza 18 mg/3 mL subcutaneous solution: 1 dose(s) subcutaneous once a day (08 Apr 2022 18:57)  Vitamin B12: 5000 microgram(s) orally once a day (08 Apr 2022 18:57)  Vitamin D3 25 mcg (1000 intl units) oral tablet: 1 tab(s) orally once a day (08 Apr 2022 18:57)    VITALS  ICU Vital Signs Last 24 Hrs  T(C): 37.4 (12 Apr 2022 16:02), Max: 37.5 (12 Apr 2022 07:00)  T(F): 99.3 (12 Apr 2022 16:02), Max: 99.5 (12 Apr 2022 07:00)  HR: 83 (13 Apr 2022 03:35) (75 - 96)  ABP: 152/57 (13 Apr 2022 03:35) (114/61 - 188/71)  ABP(mean): 83 (13 Apr 2022 03:35) (67 - 107)  RR: 11 (13 Apr 2022 03:35) (9 - 31)  SpO2: 94% (13 Apr 2022 03:35) (93% - 100%)      LABS                        10.7   13.63 )-----------( 96       ( 13 Apr 2022 03:53 )             33.0   PT/INR - ( 11 Apr 2022 12:39 )   PT: 16.7 sec;   INR: 1.43 ratio         PTT - ( 11 Apr 2022 12:39 )  PTT:29.1 [id number]    134<L>  |  98  |  16.4  ----------------------------<  115<H>  3.9   |  22.0  |  0.61    Ca    8.9      13 Apr 2022 03:53  Mg     2.5     04-13    TPro  5.1<L>  /  Alb  3.0<L>  /  TBili  0.5  /  DBili  0.2  /  AST  47<H>  /  ALT  48<H>  /  AlkPhos  51  04-13  CAPILLARY BLOOD GLUCOSE      POCT Blood Glucose.: 119 mg/dL (13 Apr 2022 02:10)  CARDIAC MARKERS ( 12 Apr 2022 03:18 )  x     / 0.60 ng/mL / 458 U/L / x     / 50.0 ng/mL  CARDIAC MARKERS ( 11 Apr 2022 12:39 )  x     / 0.22 ng/mL / 181 U/L / x     / 23.2 ng/mL      ABG - ( 11 Apr 2022 20:56 )  pH, Arterial: 7.420 pH, Blood: x     /  pCO2: 42    /  pO2: 91    / HCO3: 27    / Base Excess: 2.7   /  SaO2: 96.9            IN/OUT    04-11-22 @ 07:01  -  04-12-22 @ 07:00  --------------------------------------------------------  IN: 3950.5 mL / OUT: 3510 mL / NET: 440.5 mL    04-12-22 @ 07:01  -  04-13-22 @ 04:50  --------------------------------------------------------  IN: 1320 mL / OUT: 1975 mL / NET: -655 mL      IMAGING  personally reviewed imaging     CURRENT MEDICATIONS  MEDICATIONS  (STANDING):  aMIOdarone    Tablet   Oral   aMIOdarone    Tablet 400 milliGRAM(s) Oral every 8 hours  aspirin  chewable 81 milliGRAM(s) Oral daily  atorvastatin 80 milliGRAM(s) Oral at bedtime  cefuroxime  IVPB 1500 milliGRAM(s) IV Intermittent every 8 hours  chlorhexidine 2% Cloths 1 Application(s) Topical daily  clopidogrel Tablet 75 milliGRAM(s) Oral daily  enoxaparin Injectable 40 milliGRAM(s) SubCutaneous every 24 hours  escitalopram 20 milliGRAM(s) Oral daily  furosemide    Tablet 40 milliGRAM(s) Oral daily  insulin glargine Injectable (LANTUS) 16 Unit(s) SubCutaneous at bedtime  insulin lispro (ADMELOG) corrective regimen sliding scale   SubCutaneous Before meals and at bedtime  insulin lispro Injectable (ADMELOG) 4 Unit(s) SubCutaneous three times a day before meals  insulin regular Infusion 2 Unit(s)/Hr (2 mL/Hr) IV Continuous <Continuous>  levothyroxine 137 MICROGram(s) Oral daily  metoprolol tartrate 25 milliGRAM(s) Oral every 12 hours  pantoprazole    Tablet 40 milliGRAM(s) Oral daily  polyethylene glycol 3350 17 Gram(s) Oral daily  potassium chloride    Tablet ER 40 milliEquivalent(s) Oral daily  senna 2 Tablet(s) Oral at bedtime  sodium chloride 0.9%. 1000 milliLiter(s) (10 mL/Hr) IV Continuous <Continuous>  sodium chloride 0.9%. 1000 milliLiter(s) (5 mL/Hr) IV Continuous <Continuous>  tamsulosin 0.8 milliGRAM(s) Oral at bedtime  traZODone 100 milliGRAM(s) Oral at bedtime  vancomycin  IVPB 1000 milliGRAM(s) IV Intermittent every 12 hours    MEDICATIONS  (PRN):  acetaminophen     Tablet .. 975 milliGRAM(s) Oral every 6 hours PRN Mild Pain (1 - 3)  HYDROmorphone  Injectable 0.5 milliGRAM(s) IV Push every 3 hours PRN breakthrouigh pain  oxyCODONE    IR 5 milliGRAM(s) Oral every 4 hours PRN Moderate Pain (4 - 6)  oxyCODONE    IR 10 milliGRAM(s) Oral every 4 hours PRN Severe Pain (7 - 10)

## 2022-04-13 NOTE — DIETITIAN INITIAL EVALUATION ADULT - PERTINENT MEDS FT
MEDICATIONS  (STANDING):  aMIOdarone    Tablet   Oral   aMIOdarone    Tablet 400 milliGRAM(s) Oral every 8 hours  aspirin  chewable 81 milliGRAM(s) Oral daily  atorvastatin 80 milliGRAM(s) Oral at bedtime  clopidogrel Tablet 75 milliGRAM(s) Oral daily  enoxaparin Injectable 40 milliGRAM(s) SubCutaneous every 24 hours  escitalopram 20 milliGRAM(s) Oral daily  furosemide    Tablet 40 milliGRAM(s) Oral daily  insulin glargine Injectable (LANTUS) 16 Unit(s) SubCutaneous at bedtime  insulin lispro (ADMELOG) corrective regimen sliding scale   SubCutaneous Before meals and at bedtime  insulin lispro Injectable (ADMELOG) 4 Unit(s) SubCutaneous three times a day before meals  levothyroxine 137 MICROGram(s) Oral daily  metoprolol tartrate 25 milliGRAM(s) Oral every 12 hours  pantoprazole    Tablet 40 milliGRAM(s) Oral daily  polyethylene glycol 3350 17 Gram(s) Oral daily  potassium chloride    Tablet ER 40 milliEquivalent(s) Oral daily  senna 2 Tablet(s) Oral at bedtime  sodium chloride 0.9% lock flush 3 milliLiter(s) IV Push every 8 hours  tamsulosin 0.8 milliGRAM(s) Oral at bedtime  traZODone 100 milliGRAM(s) Oral at bedtime    MEDICATIONS  (PRN):  acetaminophen     Tablet .. 975 milliGRAM(s) Oral every 6 hours PRN Mild Pain (1 - 3)  oxyCODONE    IR 5 milliGRAM(s) Oral every 4 hours PRN Moderate Pain (4 - 6)  oxyCODONE    IR 10 milliGRAM(s) Oral every 4 hours PRN Severe Pain (7 - 10)

## 2022-04-13 NOTE — DIETITIAN NUTRITION RISK NOTIFICATION - TREATMENT: THE FOLLOWING DIET HAS BEEN RECOMMENDED
Mom LVM returning call. Will be available anytime.   Diet, Consistent Carbohydrate/No Snacks:   DASH/TLC {Sodium & Cholesterol Restricted} (DASH) (04-12-22 @ 11:29) [Active]

## 2022-04-13 NOTE — DIETITIAN INITIAL EVALUATION ADULT - NS FNS DIET ORDER
Diet, Consistent Carbohydrate/No Snacks:   DASH/TLC {Sodium & Cholesterol Restricted} (DASH) (04-12-22 @ 11:29)

## 2022-04-14 LAB
ANION GAP SERPL CALC-SCNC: 8 MMOL/L — SIGNIFICANT CHANGE UP (ref 5–17)
BUN SERPL-MCNC: 21.1 MG/DL — HIGH (ref 8–20)
CALCIUM SERPL-MCNC: 8.6 MG/DL — SIGNIFICANT CHANGE UP (ref 8.6–10.2)
CHLORIDE SERPL-SCNC: 101 MMOL/L — SIGNIFICANT CHANGE UP (ref 98–107)
CO2 SERPL-SCNC: 26 MMOL/L — SIGNIFICANT CHANGE UP (ref 22–29)
CREAT SERPL-MCNC: 0.64 MG/DL — SIGNIFICANT CHANGE UP (ref 0.5–1.3)
EGFR: 101 ML/MIN/1.73M2 — SIGNIFICANT CHANGE UP
GLUCOSE BLDC GLUCOMTR-MCNC: 204 MG/DL — HIGH (ref 70–99)
GLUCOSE BLDC GLUCOMTR-MCNC: 206 MG/DL — HIGH (ref 70–99)
GLUCOSE BLDC GLUCOMTR-MCNC: 209 MG/DL — HIGH (ref 70–99)
GLUCOSE BLDC GLUCOMTR-MCNC: 216 MG/DL — HIGH (ref 70–99)
GLUCOSE BLDC GLUCOMTR-MCNC: 227 MG/DL — HIGH (ref 70–99)
GLUCOSE SERPL-MCNC: 163 MG/DL — HIGH (ref 70–99)
HCT VFR BLD CALC: 29 % — LOW (ref 39–50)
HGB BLD-MCNC: 9.8 G/DL — LOW (ref 13–17)
MAGNESIUM SERPL-MCNC: 2.1 MG/DL — SIGNIFICANT CHANGE UP (ref 1.6–2.6)
MCHC RBC-ENTMCNC: 26.8 PG — LOW (ref 27–34)
MCHC RBC-ENTMCNC: 33.8 GM/DL — SIGNIFICANT CHANGE UP (ref 32–36)
MCV RBC AUTO: 79.5 FL — LOW (ref 80–100)
PLATELET # BLD AUTO: 94 K/UL — LOW (ref 150–400)
POTASSIUM SERPL-MCNC: 4.3 MMOL/L — SIGNIFICANT CHANGE UP (ref 3.5–5.3)
POTASSIUM SERPL-SCNC: 4.3 MMOL/L — SIGNIFICANT CHANGE UP (ref 3.5–5.3)
RBC # BLD: 3.65 M/UL — LOW (ref 4.2–5.8)
RBC # FLD: 19.3 % — HIGH (ref 10.3–14.5)
SODIUM SERPL-SCNC: 135 MMOL/L — SIGNIFICANT CHANGE UP (ref 135–145)
WBC # BLD: 10.48 K/UL — SIGNIFICANT CHANGE UP (ref 3.8–10.5)
WBC # FLD AUTO: 10.48 K/UL — SIGNIFICANT CHANGE UP (ref 3.8–10.5)

## 2022-04-14 PROCEDURE — 71045 X-RAY EXAM CHEST 1 VIEW: CPT | Mod: 26

## 2022-04-14 PROCEDURE — 93010 ELECTROCARDIOGRAM REPORT: CPT

## 2022-04-14 PROCEDURE — 99232 SBSQ HOSP IP/OBS MODERATE 35: CPT

## 2022-04-14 PROCEDURE — 93308 TTE F-UP OR LMTD: CPT | Mod: 26

## 2022-04-14 RX ORDER — INSULIN GLARGINE 100 [IU]/ML
18 INJECTION, SOLUTION SUBCUTANEOUS AT BEDTIME
Refills: 0 | Status: DISCONTINUED | OUTPATIENT
Start: 2022-04-14 | End: 2022-04-16

## 2022-04-14 RX ORDER — CALCIUM GLUCONATE 100 MG/ML
2 VIAL (ML) INTRAVENOUS ONCE
Refills: 0 | Status: COMPLETED | OUTPATIENT
Start: 2022-04-14 | End: 2022-04-14

## 2022-04-14 RX ORDER — AMIODARONE HYDROCHLORIDE 400 MG/1
150 TABLET ORAL ONCE
Refills: 0 | Status: COMPLETED | OUTPATIENT
Start: 2022-04-14 | End: 2022-04-14

## 2022-04-14 RX ORDER — ALBUMIN HUMAN 25 %
250 VIAL (ML) INTRAVENOUS ONCE
Refills: 0 | Status: COMPLETED | OUTPATIENT
Start: 2022-04-14 | End: 2022-04-14

## 2022-04-14 RX ORDER — MAGNESIUM SULFATE 500 MG/ML
2 VIAL (ML) INJECTION ONCE
Refills: 0 | Status: COMPLETED | OUTPATIENT
Start: 2022-04-14 | End: 2022-04-14

## 2022-04-14 RX ADMIN — SENNA PLUS 2 TABLET(S): 8.6 TABLET ORAL at 22:21

## 2022-04-14 RX ADMIN — POLYETHYLENE GLYCOL 3350 17 GRAM(S): 17 POWDER, FOR SOLUTION ORAL at 12:29

## 2022-04-14 RX ADMIN — AMIODARONE HYDROCHLORIDE 618 MILLIGRAM(S): 400 TABLET ORAL at 09:37

## 2022-04-14 RX ADMIN — SODIUM CHLORIDE 3 MILLILITER(S): 9 INJECTION INTRAMUSCULAR; INTRAVENOUS; SUBCUTANEOUS at 22:00

## 2022-04-14 RX ADMIN — TAMSULOSIN HYDROCHLORIDE 0.8 MILLIGRAM(S): 0.4 CAPSULE ORAL at 22:20

## 2022-04-14 RX ADMIN — Medication 4: at 22:26

## 2022-04-14 RX ADMIN — AMIODARONE HYDROCHLORIDE 400 MILLIGRAM(S): 400 TABLET ORAL at 22:18

## 2022-04-14 RX ADMIN — OXYCODONE HYDROCHLORIDE 10 MILLIGRAM(S): 5 TABLET ORAL at 07:01

## 2022-04-14 RX ADMIN — Medication 25 MILLIGRAM(S): at 17:25

## 2022-04-14 RX ADMIN — Medication 6 UNIT(S): at 12:29

## 2022-04-14 RX ADMIN — PANTOPRAZOLE SODIUM 40 MILLIGRAM(S): 20 TABLET, DELAYED RELEASE ORAL at 12:28

## 2022-04-14 RX ADMIN — ESCITALOPRAM OXALATE 20 MILLIGRAM(S): 10 TABLET, FILM COATED ORAL at 12:28

## 2022-04-14 RX ADMIN — Medication 4: at 17:25

## 2022-04-14 RX ADMIN — Medication 137 MICROGRAM(S): at 05:08

## 2022-04-14 RX ADMIN — Medication 25 GRAM(S): at 09:41

## 2022-04-14 RX ADMIN — AMIODARONE HYDROCHLORIDE 400 MILLIGRAM(S): 400 TABLET ORAL at 14:45

## 2022-04-14 RX ADMIN — Medication 6 UNIT(S): at 17:25

## 2022-04-14 RX ADMIN — Medication 40 MILLIEQUIVALENT(S): at 12:28

## 2022-04-14 RX ADMIN — Medication 4: at 12:28

## 2022-04-14 RX ADMIN — Medication 4: at 08:33

## 2022-04-14 RX ADMIN — ATORVASTATIN CALCIUM 80 MILLIGRAM(S): 80 TABLET, FILM COATED ORAL at 22:18

## 2022-04-14 RX ADMIN — Medication 6 UNIT(S): at 08:34

## 2022-04-14 RX ADMIN — Medication 25 MILLIGRAM(S): at 04:42

## 2022-04-14 RX ADMIN — ENOXAPARIN SODIUM 40 MILLIGRAM(S): 100 INJECTION SUBCUTANEOUS at 22:21

## 2022-04-14 RX ADMIN — SODIUM CHLORIDE 3 MILLILITER(S): 9 INJECTION INTRAMUSCULAR; INTRAVENOUS; SUBCUTANEOUS at 04:39

## 2022-04-14 RX ADMIN — AMIODARONE HYDROCHLORIDE 400 MILLIGRAM(S): 400 TABLET ORAL at 04:42

## 2022-04-14 RX ADMIN — Medication 81 MILLIGRAM(S): at 12:28

## 2022-04-14 RX ADMIN — CLOPIDOGREL BISULFATE 75 MILLIGRAM(S): 75 TABLET, FILM COATED ORAL at 12:28

## 2022-04-14 RX ADMIN — INSULIN GLARGINE 16 UNIT(S): 100 INJECTION, SOLUTION SUBCUTANEOUS at 22:26

## 2022-04-14 RX ADMIN — SODIUM CHLORIDE 3 MILLILITER(S): 9 INJECTION INTRAMUSCULAR; INTRAVENOUS; SUBCUTANEOUS at 14:13

## 2022-04-14 RX ADMIN — OXYCODONE HYDROCHLORIDE 10 MILLIGRAM(S): 5 TABLET ORAL at 07:43

## 2022-04-14 RX ADMIN — Medication 200 GRAM(S): at 09:09

## 2022-04-14 RX ADMIN — Medication 125 MILLILITER(S): at 14:45

## 2022-04-14 RX ADMIN — Medication 100 MILLIGRAM(S): at 22:18

## 2022-04-14 RX ADMIN — Medication 40 MILLIGRAM(S): at 04:42

## 2022-04-14 NOTE — PROGRESS NOTE ADULT - SUBJECTIVE AND OBJECTIVE BOX
POD #3 s/p CABG x 3 (LIMA-LAD, SVG-OM, SVG-PDA) with LAD endarterectomy and SVG patch    PAST MEDICAL & SURGICAL HISTORY:  Type 2 diabetes mellitus  HTN (hypertension)  HLD (hyperlipidemia)  Thyroid cancer  CAD (coronary artery disease)  Myocardial infarction  Spinal stenosis  OA (osteoarthritis) of knee  Depression, major  H/O cholecystitis  S/P gastric bypass, complicated by PUD  H/O thyroidectomy  S/P cholecystectomy  S/P hernia repair    FAMILY HISTORY:  FHx: myocardial infarction (Father)  Family history of cancer of larynx (Father)    Brief Hospital Course: 72M with PMHx of MI s/p stents (1983), CAD s/p multiple balloon angioplasties up to a few years ago, DM II (HA1c 7.5),  HLD, HTN, thyroid cancer s/p thyroidectomy, cholecystitis s/p sepsis requiring cholecystectomy, PUD s/p gastric bypass, spinal stenosis, depression presented to the [facility] ED 4/8/22 c/o of chest pain, nausea, and regurgitation. Pt underwent cardiac catheterization at [facility] showing multivessel disease and was transferred to [facility].  Pt underwent CABG X 3, LAD endarterectomy with SVG patch 4/11/22.  Postop course notable for brief episode of afib (continue PO amio).      Significant recent/past 24 hr events: No overnight events reported.    Subjective: Patient lying in bed in NAD. +Tolerating diet. +Passing gas. Pain  currently controlled at this time. Denies fevers, chills, lightheadedness, dizziness, HA, CP, palpitations, SOB, cough, abdominal pain, N/V, diarrhea, numbness/tingling in extremities, or any other acute complaints. ROS negative x 10 systems except as noted above.    MEDICATIONS  (STANDING):  aMIOdarone    Tablet 400 milliGRAM(s) Oral every 8 hours  aspirin  chewable 81 milliGRAM(s) Oral daily  atorvastatin 80 milliGRAM(s) Oral at bedtime  clopidogrel Tablet 75 milliGRAM(s) Oral daily  enoxaparin Injectable 40 milliGRAM(s) SubCutaneous every 24 hours  escitalopram 20 milliGRAM(s) Oral daily  furosemide    Tablet 40 milliGRAM(s) Oral daily  insulin glargine Injectable (LANTUS) 16 Unit(s) SubCutaneous at bedtime  insulin lispro (ADMELOG) corrective regimen sliding scale   SubCutaneous Before meals and at bedtime  insulin lispro Injectable (ADMELOG) 6 Unit(s) SubCutaneous three times a day before meals  levothyroxine 137 MICROGram(s) Oral daily  metoprolol tartrate 25 milliGRAM(s) Oral every 12 hours  pantoprazole    Tablet 40 milliGRAM(s) Oral daily  polyethylene glycol 3350 17 Gram(s) Oral daily  potassium chloride    Tablet ER 40 milliEquivalent(s) Oral daily  senna 2 Tablet(s) Oral at bedtime  sodium chloride 0.9% lock flush 3 milliLiter(s) IV Push every 8 hours  tamsulosin 0.8 milliGRAM(s) Oral at bedtime  traZODone 100 milliGRAM(s) Oral at bedtime    MEDICATIONS  (PRN):  acetaminophen     Tablet .. 975 milliGRAM(s) Oral every 6 hours PRN Mild Pain (1 - 3)  oxyCODONE    IR 5 milliGRAM(s) Oral every 4 hours PRN Moderate Pain (4 - 6)  oxyCODONE    IR 10 milliGRAM(s) Oral every 4 hours PRN Severe Pain (7 - 10)    Allergies:  ACE inhibitors (Other)  levofloxacin (Joint Pain)    Vitals   T(C): 36.7 (14 Apr 2022 00:30), Max: 37.7 (13 Apr 2022 07:00)  T(F): 98 (14 Apr 2022 00:30), Max: 99.9 (13 Apr 2022 07:00)  HR: 79 (14 Apr 2022 00:30) (76 - 99)  BP: 112/70 (14 Apr 2022 00:30) (107/57 - 132/69)  BP(mean): 84 (14 Apr 2022 00:30) (76 - 84)  ABP: 130/54 (13 Apr 2022 08:00) (130/54 - 181/62)  ABP(mean): 80 (13 Apr 2022 08:00) (80 - 95)  RR: 17 (14 Apr 2022 00:30) (10 - 20)  SpO2: 95% (14 Apr 2022 00:30) (92% - 100%)    I&O's Detail    12 Apr 2022 07:01  -  13 Apr 2022 07:00  --------------------------------------------------------  IN:    Insulin: 35 mL    IV PiggyBack: 100 mL    IV PiggyBack: 100 mL    IV PiggyBack: 100 mL    IV PiggyBack: 500 mL    Oral Fluid: 170 mL    sodium chloride 0.9%: 230 mL    sodium chloride 0.9%: 115 mL  Total IN: 1350 mL    OUT:    Chest Tube (mL): 40 mL    Chest Tube (mL): 250 mL    Indwelling Catheter - Urethral (mL): 2380 mL    NiCARdipine: 0 mL  Total OUT: 2670 mL    Total NET: -1320 mL      13 Apr 2022 07:01  -  14 Apr 2022 02:05  --------------------------------------------------------  IN:    IV PiggyBack: 50 mL    IV PiggyBack: 250 mL    Oral Fluid: 240 mL    sodium chloride 0.9%: 10 mL    sodium chloride 0.9%: 5 mL  Total IN: 555 mL    OUT:    Chest Tube (mL): 0 mL    Chest Tube (mL): 40 mL    Indwelling Catheter - Urethral (mL): 550 mL    Voided (mL): 500 mL  Total OUT: 1090 mL    Total NET: -535 mL    Physical Exam  Neuro: A+O x 3, non-focal, speech clear and intact  HEENT:  NCAT, No conjuctival edema or icterus, no thrush.    Neck:  Supple, trachea midline  Pulm: +Diminished BSs bilaterally, no rales/rhonchi/wheezing, no accessory muscle use noted  Chest: +PW (settings: VVI, rate: 50, mA: 10, sensitivity: 2.0)  CV: regular rate, regular rhythm, +S1S2, no murmur or rub noted  Abd: soft, NT, ND, + BS  Ext: MARIANO x 4, no edema, no cyanosis, distal motor/neuro/circ intact  Skin: warm, dry, well perfused  Incisions: midsternal mepilex C/D/I, sternum stable, RLE C/D/I w/ dressing and Ace wrap    LABS                        10.7   13.63 )-----------( 96       ( 13 Apr 2022 03:53 )             33.0     04-13    134<L>  |  98  |  16.4  ----------------------------<  115<H>  3.9   |  22.0  |  0.61    Ca    8.9      13 Apr 2022 03:53  Mg     2.5     04-13    TPro  5.1<L>  /  Alb  3.0<L>  /  TBili  0.5  /  DBili  0.2  /  AST  47<H>  /  ALT  48<H>  /  AlkPhos  51  04-13    POCT Blood Glucose.: 289 mg/dL (04-13-22 @ 21:06)  POCT Blood Glucose.: 259 mg/dL (04-13-22 @ 17:01)  POCT Blood Glucose.: 201 mg/dL (04-13-22 @ 10:55)  POCT Blood Glucose.: 112 mg/dL (04-13-22 @ 07:33)  POCT Blood Glucose.: 119 mg/dL (04-13-22 @ 02:10)      Last CXR:  < from: Xray Chest 1 View- PORTABLE-Routine (Xray Chest 1 View- PORTABLE-Routine .) (04.11.22 @ 11:52) >  FINDINGS:  S/P sternotomy.  ET tube tip above the jacey. Enteric tube tip in stomach. Right IJ central venous catheter tip in SVC. Mediastinal drains and a left chest tube are present.  Heart/Vascular: Cardiomediastinal silhouette is within normal limits.  Pulmonary: No focal infiltrates. No pleural effusion or pneumothorax.  Bones: No acute bony finding.  Impression:  S/P cardiac surgery.  < end of copied text >

## 2022-04-14 NOTE — PROGRESS NOTE ADULT - ASSESSMENT
72M with PMHx of MI s/p stents (1983), CAD s/p multiple balloon angioplasties up to a few years ago, DM II (HA1c 7.5),  HLD, HTN, thyroid cancer s/p thyroidectomy, cholecystitis s/p sepsis requiring cholecystectomy, PUD s/p gastric bypass, spinal stenosis, depression presented to the [place] ED 4/8/22 c/o of chest pain, nausea, and regurgitation. Pt underwent cardiac catheterization at [place] showing multivessel disease and was transferred to [facility].  Pt underwent CABG X 3, LAD endarterectomy with SVG patch 4/11/22.  Postop course notable for brief episode of afib (continue PO amio).

## 2022-04-14 NOTE — PROGRESS NOTE ADULT - SUBJECTIVE AND OBJECTIVE BOX
INTERVAL HPI/OVERNIGHT EVENTS:   sitting comfortaby in chair.    MEDICATIONS  (STANDING):  acetaminophen   IVPB .. 1000 milliGRAM(s) IV Intermittent once  aspirin  chewable 81 milliGRAM(s) Oral once  atorvastatin 80 milliGRAM(s) Oral at bedtime  cefuroxime  IVPB 1500 milliGRAM(s) IV Intermittent every 8 hours  chlorhexidine 0.12% Liquid 15 milliLiter(s) Oral Mucosa every 12 hours  chlorhexidine 2% Cloths 1 Application(s) Topical daily  dextrose 50% Injectable 50 milliLiter(s) IV Push every 15 minutes  dextrose 50% Injectable 25 milliLiter(s) IV Push every 15 minutes  insulin regular Infusion 2 Unit(s)/Hr (2 mL/Hr) IV Continuous <Continuous>  levothyroxine 137 MICROGram(s) Oral daily  magnesium sulfate  IVPB 2 Gram(s) IV Intermittent once  niCARdipine Infusion 5 mG/Hr (25 mL/Hr) IV Continuous <Continuous>  pantoprazole  Injectable 40 milliGRAM(s) IV Push once  potassium chloride  10 mEq/50 mL IVPB 10 milliEquivalent(s) IV Intermittent every 1 hour  potassium chloride  10 mEq/50 mL IVPB 10 milliEquivalent(s) IV Intermittent every 1 hour  potassium chloride  10 mEq/50 mL IVPB 10 milliEquivalent(s) IV Intermittent every 1 hour  potassium chloride  10 mEq/50 mL IVPB 10 milliEquivalent(s) IV Intermittent every 1 hour  propofol Infusion 10 MICROgram(s)/kG/Min (4.08 mL/Hr) IV Continuous <Continuous>  senna 2 Tablet(s) Oral at bedtime  sodium chloride 0.9%. 1000 milliLiter(s) (10 mL/Hr) IV Continuous <Continuous>  sodium chloride 0.9%. 1000 milliLiter(s) (5 mL/Hr) IV Continuous <Continuous>  tamsulosin 0.8 milliGRAM(s) Oral at bedtime  vancomycin  IVPB 1000 milliGRAM(s) IV Intermittent every 12 hours    MEDICATIONS  (PRN):      Allergies    No Known Allergies    Intolerances    ACE inhibitors (Other)  levofloxacin (Joint Pain)      Review of systems:  no cp, no sob, no n/v, some mild chest tenderness    Vital Signs Last 24 Hrs  T(C): 35.2 (11 Apr 2022 13:00), Max: 37.1 (10 Apr 2022 15:30)  T(F): 95.4 (11 Apr 2022 13:00), Max: 98.8 (10 Apr 2022 15:30)  HR: 58 (11 Apr 2022 13:15) (57 - 69)  BP: 164/86 (11 Apr 2022 05:59) (134/73 - 169/84)  BP(mean): --  RR: 10 (11 Apr 2022 13:15) (10 - 18)  SpO2: 100% (11 Apr 2022 13:15) (94% - 100%)    PHYSICAL EXAM:  Constitutional: well-groomed, well-developed  Respiratory: CTAB  Cardiovascular: S1 and S2, RRR, no M/G/R  Gastrointestinal: BS+, soft, no organomegaly or mass  Extremities: No peripheral edema, no pedal lesions  Skin: No rashes, no acanthosis        LABS:                        10.5   12.91 )-----------( 77       ( 11 Apr 2022 12:39 )             30.8     04-11    138  |  103  |  19.0  ----------------------------<  157<H>  4.0   |  24.0  |  0.50    Ca    8.1<L>      11 Apr 2022 12:39  Mg     1.8     04-11    TPro  4.0<L>  /  Alb  2.7<L>  /  TBili  0.6  /  DBili  x   /  AST  53<H>  /  ALT  31  /  AlkPhos  29<L>  04-11         INTERVAL HPI/OVERNIGHT EVENTS:  did not sleep well last night. Now feeling better, ate his lunch    MEDICATIONS  (STANDING):  acetaminophen   IVPB .. 1000 milliGRAM(s) IV Intermittent once  aspirin  chewable 81 milliGRAM(s) Oral once  atorvastatin 80 milliGRAM(s) Oral at bedtime  cefuroxime  IVPB 1500 milliGRAM(s) IV Intermittent every 8 hours  chlorhexidine 0.12% Liquid 15 milliLiter(s) Oral Mucosa every 12 hours  chlorhexidine 2% Cloths 1 Application(s) Topical daily  dextrose 50% Injectable 50 milliLiter(s) IV Push every 15 minutes  dextrose 50% Injectable 25 milliLiter(s) IV Push every 15 minutes  insulin regular Infusion 2 Unit(s)/Hr (2 mL/Hr) IV Continuous <Continuous>  levothyroxine 137 MICROGram(s) Oral daily  magnesium sulfate  IVPB 2 Gram(s) IV Intermittent once  niCARdipine Infusion 5 mG/Hr (25 mL/Hr) IV Continuous <Continuous>  pantoprazole  Injectable 40 milliGRAM(s) IV Push once  potassium chloride  10 mEq/50 mL IVPB 10 milliEquivalent(s) IV Intermittent every 1 hour  potassium chloride  10 mEq/50 mL IVPB 10 milliEquivalent(s) IV Intermittent every 1 hour  potassium chloride  10 mEq/50 mL IVPB 10 milliEquivalent(s) IV Intermittent every 1 hour  potassium chloride  10 mEq/50 mL IVPB 10 milliEquivalent(s) IV Intermittent every 1 hour  propofol Infusion 10 MICROgram(s)/kG/Min (4.08 mL/Hr) IV Continuous <Continuous>  senna 2 Tablet(s) Oral at bedtime  sodium chloride 0.9%. 1000 milliLiter(s) (10 mL/Hr) IV Continuous <Continuous>  sodium chloride 0.9%. 1000 milliLiter(s) (5 mL/Hr) IV Continuous <Continuous>  tamsulosin 0.8 milliGRAM(s) Oral at bedtime  vancomycin  IVPB 1000 milliGRAM(s) IV Intermittent every 12 hours    MEDICATIONS  (PRN):      Allergies    No Known Allergies    Intolerances    ACE inhibitors (Other)  levofloxacin (Joint Pain)      Review of systems:  no cp, no sob, no n/v, some mild chest tenderness    Vital Signs Last 24 Hrs  T(C): 35.2 (11 Apr 2022 13:00), Max: 37.1 (10 Apr 2022 15:30)  T(F): 95.4 (11 Apr 2022 13:00), Max: 98.8 (10 Apr 2022 15:30)  HR: 58 (11 Apr 2022 13:15) (57 - 69)  BP: 164/86 (11 Apr 2022 05:59) (134/73 - 169/84)  BP(mean): --  RR: 10 (11 Apr 2022 13:15) (10 - 18)  SpO2: 100% (11 Apr 2022 13:15) (94% - 100%)    PHYSICAL EXAM:  Constitutional: well-groomed, well-developed  Respiratory: CTAB  Cardiovascular: S1 and S2, RRR, no M/G/R  Gastrointestinal: BS+, soft, no organomegaly or mass  Extremities: No peripheral edema, no pedal lesions  Skin: No rashes, no acanthosis        LABS:                        10.5   12.91 )-----------( 77       ( 11 Apr 2022 12:39 )             30.8     04-11    138  |  103  |  19.0  ----------------------------<  157<H>  4.0   |  24.0  |  0.50    Ca    8.1<L>      11 Apr 2022 12:39  Mg     1.8     04-11    TPro  4.0<L>  /  Alb  2.7<L>  /  TBili  0.6  /  DBili  x   /  AST  53<H>  /  ALT  31  /  AlkPhos  29<L>  04-11

## 2022-04-14 NOTE — PROGRESS NOTE ADULT - ASSESSMENT
72M with PMHx of MI s/p stents (1983), CAD s/p multiple balloon angioplasties up to a few years ago, DM II,  HLD, HTN, thyroid cancer s/p thyroidectomy, cholecystitis s/p sepsis requiring cholecystectomy,   PUD s/p gastric bypass, spinal stenosis, depression presented to the [facility] ED 4/8/22 c/o of chest pain, nausea, and regurgitation. Pt underwent cardiac catheterization at [facility] showing multivessel disease and was transferred to [facility] for CABG, s/p CABG 4/11/22.Endo consulted for DM and hypothyroidism.  T2DM x15 years, persisted despite bariatric surgery.  He reports good control with outpt A1c consistently around 7%.  He does not test his sugars.    He is currently taking Victoza 1.8 mg daily, MFN 1000 mg BID and Jardiance 10 mg daily at home.      T2DM complicated by CAD - A1c 7.5%  -To go up on  lantus to 18 units daily and continue  admelog  6 units tid with meals with ssi.  -check fingerstick ac tid hs  -diabetic diet.        Papillary thyroid cancer s/p Total thyroidectomy in 2018 with reported recurrence in upper chest area   which he thinks is growing. TSH <0.01 and is at goal given thyroid cancer with mets to chest  - cont LT4 137 mcg daily  - tried calling Dr Barber, to get more info on the chest recurrence, but office closed  - check thyroglobulin and Tg ab  - check free T4 levels     CAD (coronary artery disease) p/p Cath with multivessel disease and in-stent stenosis  -s/p  CABG today   - continue ASA, Lipitor, and Lopressor as BP permits

## 2022-04-15 ENCOUNTER — TRANSCRIPTION ENCOUNTER (OUTPATIENT)
Age: 72
End: 2022-04-15

## 2022-04-15 LAB
ANION GAP SERPL CALC-SCNC: 9 MMOL/L — SIGNIFICANT CHANGE UP (ref 5–17)
BUN SERPL-MCNC: 25.9 MG/DL — HIGH (ref 8–20)
CALCIUM SERPL-MCNC: 8.6 MG/DL — SIGNIFICANT CHANGE UP (ref 8.6–10.2)
CHLORIDE SERPL-SCNC: 102 MMOL/L — SIGNIFICANT CHANGE UP (ref 98–107)
CO2 SERPL-SCNC: 24 MMOL/L — SIGNIFICANT CHANGE UP (ref 22–29)
CREAT SERPL-MCNC: 0.72 MG/DL — SIGNIFICANT CHANGE UP (ref 0.5–1.3)
EGFR: 97 ML/MIN/1.73M2 — SIGNIFICANT CHANGE UP
GLUCOSE BLDC GLUCOMTR-MCNC: 155 MG/DL — HIGH (ref 70–99)
GLUCOSE BLDC GLUCOMTR-MCNC: 180 MG/DL — HIGH (ref 70–99)
GLUCOSE BLDC GLUCOMTR-MCNC: 186 MG/DL — HIGH (ref 70–99)
GLUCOSE BLDC GLUCOMTR-MCNC: 275 MG/DL — HIGH (ref 70–99)
GLUCOSE SERPL-MCNC: 148 MG/DL — HIGH (ref 70–99)
HCT VFR BLD CALC: 25.7 % — LOW (ref 39–50)
HGB BLD-MCNC: 8.4 G/DL — LOW (ref 13–17)
MAGNESIUM SERPL-MCNC: 2.3 MG/DL — SIGNIFICANT CHANGE UP (ref 1.6–2.6)
MCHC RBC-ENTMCNC: 26.3 PG — LOW (ref 27–34)
MCHC RBC-ENTMCNC: 32.7 GM/DL — SIGNIFICANT CHANGE UP (ref 32–36)
MCV RBC AUTO: 80.6 FL — SIGNIFICANT CHANGE UP (ref 80–100)
PLATELET # BLD AUTO: 103 K/UL — LOW (ref 150–400)
POTASSIUM SERPL-MCNC: 4.3 MMOL/L — SIGNIFICANT CHANGE UP (ref 3.5–5.3)
POTASSIUM SERPL-SCNC: 4.3 MMOL/L — SIGNIFICANT CHANGE UP (ref 3.5–5.3)
RBC # BLD: 3.19 M/UL — LOW (ref 4.2–5.8)
RBC # FLD: 19.3 % — HIGH (ref 10.3–14.5)
SODIUM SERPL-SCNC: 135 MMOL/L — SIGNIFICANT CHANGE UP (ref 135–145)
WBC # BLD: 8.51 K/UL — SIGNIFICANT CHANGE UP (ref 3.8–10.5)
WBC # FLD AUTO: 8.51 K/UL — SIGNIFICANT CHANGE UP (ref 3.8–10.5)

## 2022-04-15 PROCEDURE — 99233 SBSQ HOSP IP/OBS HIGH 50: CPT

## 2022-04-15 PROCEDURE — 71045 X-RAY EXAM CHEST 1 VIEW: CPT | Mod: 26

## 2022-04-15 RX ORDER — SORBITOL SOLUTION 70 %
20 SOLUTION, ORAL MISCELLANEOUS ONCE
Refills: 0 | Status: COMPLETED | OUTPATIENT
Start: 2022-04-15 | End: 2022-04-15

## 2022-04-15 RX ORDER — LANOLIN ALCOHOL/MO/W.PET/CERES
5 CREAM (GRAM) TOPICAL ONCE
Refills: 0 | Status: COMPLETED | OUTPATIENT
Start: 2022-04-15 | End: 2022-04-15

## 2022-04-15 RX ADMIN — AMIODARONE HYDROCHLORIDE 400 MILLIGRAM(S): 400 TABLET ORAL at 21:32

## 2022-04-15 RX ADMIN — Medication 6 UNIT(S): at 12:32

## 2022-04-15 RX ADMIN — Medication 40 MILLIGRAM(S): at 06:11

## 2022-04-15 RX ADMIN — Medication 100 MILLIGRAM(S): at 21:31

## 2022-04-15 RX ADMIN — Medication 6 UNIT(S): at 08:16

## 2022-04-15 RX ADMIN — Medication 25 MILLIGRAM(S): at 17:16

## 2022-04-15 RX ADMIN — Medication 2: at 08:16

## 2022-04-15 RX ADMIN — Medication 81 MILLIGRAM(S): at 09:22

## 2022-04-15 RX ADMIN — Medication 40 MILLIEQUIVALENT(S): at 09:21

## 2022-04-15 RX ADMIN — Medication 2: at 17:16

## 2022-04-15 RX ADMIN — CLOPIDOGREL BISULFATE 75 MILLIGRAM(S): 75 TABLET, FILM COATED ORAL at 09:22

## 2022-04-15 RX ADMIN — Medication 20 MILLILITER(S): at 09:21

## 2022-04-15 RX ADMIN — SODIUM CHLORIDE 3 MILLILITER(S): 9 INJECTION INTRAMUSCULAR; INTRAVENOUS; SUBCUTANEOUS at 21:30

## 2022-04-15 RX ADMIN — OXYCODONE HYDROCHLORIDE 10 MILLIGRAM(S): 5 TABLET ORAL at 22:30

## 2022-04-15 RX ADMIN — ATORVASTATIN CALCIUM 80 MILLIGRAM(S): 80 TABLET, FILM COATED ORAL at 21:32

## 2022-04-15 RX ADMIN — PANTOPRAZOLE SODIUM 40 MILLIGRAM(S): 20 TABLET, DELAYED RELEASE ORAL at 09:22

## 2022-04-15 RX ADMIN — SODIUM CHLORIDE 3 MILLILITER(S): 9 INJECTION INTRAMUSCULAR; INTRAVENOUS; SUBCUTANEOUS at 13:08

## 2022-04-15 RX ADMIN — TAMSULOSIN HYDROCHLORIDE 0.8 MILLIGRAM(S): 0.4 CAPSULE ORAL at 21:32

## 2022-04-15 RX ADMIN — AMIODARONE HYDROCHLORIDE 400 MILLIGRAM(S): 400 TABLET ORAL at 13:28

## 2022-04-15 RX ADMIN — Medication 137 MICROGRAM(S): at 06:11

## 2022-04-15 RX ADMIN — Medication 5 MILLIGRAM(S): at 03:39

## 2022-04-15 RX ADMIN — Medication 6 UNIT(S): at 17:16

## 2022-04-15 RX ADMIN — OXYCODONE HYDROCHLORIDE 10 MILLIGRAM(S): 5 TABLET ORAL at 21:31

## 2022-04-15 RX ADMIN — Medication 25 MILLIGRAM(S): at 06:11

## 2022-04-15 RX ADMIN — Medication 2: at 12:30

## 2022-04-15 RX ADMIN — INSULIN GLARGINE 18 UNIT(S): 100 INJECTION, SOLUTION SUBCUTANEOUS at 21:30

## 2022-04-15 RX ADMIN — Medication 6: at 21:30

## 2022-04-15 RX ADMIN — POLYETHYLENE GLYCOL 3350 17 GRAM(S): 17 POWDER, FOR SOLUTION ORAL at 09:21

## 2022-04-15 RX ADMIN — ESCITALOPRAM OXALATE 20 MILLIGRAM(S): 10 TABLET, FILM COATED ORAL at 09:22

## 2022-04-15 RX ADMIN — AMIODARONE HYDROCHLORIDE 400 MILLIGRAM(S): 400 TABLET ORAL at 06:11

## 2022-04-15 RX ADMIN — ENOXAPARIN SODIUM 40 MILLIGRAM(S): 100 INJECTION SUBCUTANEOUS at 21:32

## 2022-04-15 NOTE — DISCHARGE NOTE PROVIDER - NSDCCPTREATMENT_GEN_ALL_CORE_FT
PRINCIPAL PROCEDURE  Procedure: CABG, with COOKIE  Findings and Treatment:       SECONDARY PROCEDURE  Procedure: Coronary endarterectomy  Findings and Treatment: LAD

## 2022-04-15 NOTE — DISCHARGE NOTE PROVIDER - CARE PROVIDER_API CALL
Chao Stewart)  Surgery; Thoracic and Cardiac Surgery  [address] [place] [address]  Phone: [phone]  Fax: [phone]  Follow Up Time:     Boston Flores)  Cardiovascular Disease; Internal Medicine; Interventional Cardiology  [address] [place] [address]  Phone: [phone]  Fax: [phone]  Follow Up Time:    Chao Stewart)  Surgery; Thoracic and Cardiac Surgery  [address] [place] [address]  Phone: [phone]  Fax: [phone]  Follow Up Time:     Boston Flores)  Cardiovascular Disease; Internal Medicine; Interventional Cardiology  [address] [place] [address]  Phone: [phone]  Fax: [phone]  Follow Up Time:     Ellie Cortes (DO)  EndocrinologyMetabDiabetes; Internal Medicine  [address] [place] [address]  Phone: [phone]  Fax: [phone]  Follow Up Time:

## 2022-04-15 NOTE — PROGRESS NOTE ADULT - ASSESSMENT
72M with PMHx of MI s/p stents (1983), CAD s/p multiple balloon angioplasties up to a few years ago, DM II,  HLD, HTN, thyroid cancer s/p thyroidectomy, cholecystitis s/p sepsis requiring cholecystectomy,   PUD s/p gastric bypass, spinal stenosis, depression presented to the [place] ED 4/8/22 c/o of chest pain, nausea, and regurgitation. Pt underwent cardiac catheterization at [place] showing multivessel disease and was transferred to [facility] for CABG, s/p CABG 4/11/22.Endo consulted for DM and hypothyroidism.  T2DM x15 years, persisted despite bariatric surgery.  He reports good control with outpt A1c consistently around 7%.  He does not test his sugars.    He is currently taking Victoza 1.8 mg daily, MFN 1000 mg BID and Jardiance 10 mg daily at home.      T2DM complicated by CAD - A1c 7.5% on admission  -Continue  lantus 16 units daily and continue  admelog  6 units tid with meals with ssi.  -check fingerstick ac tid hs  -diabetic diet.  -Once ready to be discharge, may resume home meds.        Papillary thyroid cancer s/p Total thyroidectomy in 2018 with reported recurrence in upper chest area   which he thinks is growing. TSH <0.01 and is at goal given thyroid cancer with mets to chest  - cont LT4 137 mcg daily  - tried calling Dr Barber, to get more info on the chest recurrence, but office closed  - check thyroglobulin and Tg ab  - check free T4 levels     CAD (coronary artery disease) p/p Cath with multivessel disease and in-stent stenosis  -s/p  CABG today   - continue ASA, Lipitor, and Lopressor as BP permits   72M with PMHx of MI s/p stents (1983), CAD s/p multiple balloon angioplasties up to a few years ago, DM II,  HLD, HTN, thyroid cancer s/p thyroidectomy, cholecystitis s/p sepsis requiring cholecystectomy,   PUD s/p gastric bypass, spinal stenosis, depression presented to the [place] ED 4/8/22 c/o of chest pain, nausea, and regurgitation. Pt underwent cardiac catheterization at [place] showing multivessel disease and was transferred to [facility] for CABG, s/p CABG 4/11/22.Endo consulted for DM and hypothyroidism.  T2DM x15 years, persisted despite bariatric surgery.  He reports good control with outpt A1c consistently around 7%.  He does not test his sugars.    He is currently taking Victoza 1.8 mg daily, MFN 1000 mg BID and Jardiance 10 mg daily at home.  He does not check sugars at home.    T2DM complicated by CAD - A1c 7.5% on admission  -Continue  lantus 16 units daily and continue  admelog  6 units tid with meals with ssi.  -check fingerstick ac tid hs  -diabetic diet.  -Once ready to be discharge, may resume home meds. Also send him home on lantus 15 units daily, to take only if sugars above 150 at home.  -He does not like to check sugars, please send him home with script of sensor , freestyle willie 2 sensor, he says he will buy out of pocket.  -To schedule follow up with me in my clinic in a few weeks.        Papillary thyroid cancer s/p Total thyroidectomy in 2018 with reported recurrence in upper chest area   which he thinks is growing. TSH <0.01 and is at goal given thyroid cancer with mets to chest  - cont LT4 137 mcg daily  - tried calling Dr Barber, to get more info on the chest recurrence, but office closed  - check thyroglobulin and Tg ab  - check free T4 levels     CAD (coronary artery disease) p/p Cath with multivessel disease and in-stent stenosis  -s/p  CABG today   - continue ASA, Lipitor, and Lopressor as BP permits

## 2022-04-15 NOTE — DISCHARGE NOTE PROVIDER - DETAILS OF MALNUTRITION DIAGNOSIS/DIAGNOSES
This patient has been assessed with a concern for Malnutrition and was treated during this hospitalization for the following Nutrition diagnosis/diagnoses:     -  04/13/2022: Moderate protein-calorie malnutrition

## 2022-04-15 NOTE — PROGRESS NOTE ADULT - SUBJECTIVE AND OBJECTIVE BOX
POD 4 s/p C3L, LAD coronary endarterectomy with SVG patch, postop course notable for AF    Subjective: no complaints, +flatus, denies BM, denies CP, palpitations, SOB, cough, fever, chills, itchiness/rash, diaphoresis, vision changes, HA, dizziness/lightheadedness, numbness/tingling, abd pain, N/V     T(C): 36.9 (04-15-22 @ 00:27), Max: 37.3 (04-14-22 @ 04:30)  HR: 69 (04-15-22 @ 00:27) (69 - 139)  BP: 158/70 (04-15-22 @ 00:27) (94/61 - 158/70)  RR: 18 (04-15-22 @ 00:27) (17 - 19)  SpO2: 95% (04-15-22 @ 00:27) (94% - 99%)    04-14    135  |  101  |  21.1<H>  ----------------------------<  163<H>  4.3   |  26.0  |  0.64    Ca    8.6      14 Apr 2022 04:48  Mg     2.1     04-14    TPro  5.1<L>  /  Alb  3.0<L>  /  TBili  0.5  /  DBili  0.2  /  AST  47<H>  /  ALT  48<H>  /  AlkPhos  51  04-13                               9.8    10.48 )-----------( 94       ( 14 Apr 2022 04:48 )             29.0     CAPILLARY BLOOD GLUCOSE  POCT Blood Glucose.: 206 mg/dL (14 Apr 2022 22:24)  POCT Blood Glucose.: 216 mg/dL (14 Apr 2022 21:19)  POCT Blood Glucose.: 204 mg/dL (14 Apr 2022 17:16)  POCT Blood Glucose.: 227 mg/dL (14 Apr 2022 12:08)  POCT Blood Glucose.: 209 mg/dL (14 Apr 2022 08:10)    I&O's Detail    13 Apr 2022 07:01  -  14 Apr 2022 07:00  --------------------------------------------------------  IN:    IV PiggyBack: 250 mL    IV PiggyBack: 50 mL    Oral Fluid: 720 mL    sodium chloride 0.9%: 10 mL    sodium chloride 0.9%: 5 mL  Total IN: 1035 mL    OUT:    Chest Tube (mL): 0 mL    Chest Tube (mL): 40 mL    Indwelling Catheter - Urethral (mL): 550 mL    Voided (mL): 1200 mL  Total OUT: 1790 mL  Total NET: -755 mL    14 Apr 2022 07:01  -  15 Apr 2022 01:38  --------------------------------------------------------  IN:    Albumin 5%  - 250 mL: 250 mL    Oral Fluid: 590 mL  Total IN: 840 mL    OUT:    Voided (mL): 350 mL  Total OUT: 350 mL  Total NET: 490 mL    Drug Dosing Weight  Height (cm): 165.1 (11 Apr 2022 05:59)  Weight (kg): 68 (11 Apr 2022 05:59)  BMI (kg/m2): 24.9 (11 Apr 2022 05:59)  BSA (m2): 1.75 (11 Apr 2022 05:59)    MEDICATIONS  (STANDING):  aMIOdarone    Tablet 400 milliGRAM(s) Oral every 8 hours  aMIOdarone    Tablet   Oral   aspirin  chewable 81 milliGRAM(s) Oral daily  atorvastatin 80 milliGRAM(s) Oral at bedtime  clopidogrel Tablet 75 milliGRAM(s) Oral daily  enoxaparin Injectable 40 milliGRAM(s) SubCutaneous every 24 hours  escitalopram 20 milliGRAM(s) Oral daily  furosemide    Tablet 40 milliGRAM(s) Oral daily  insulin glargine Injectable (LANTUS) 18 Unit(s) SubCutaneous at bedtime  insulin lispro (ADMELOG) corrective regimen sliding scale   SubCutaneous Before meals and at bedtime  insulin lispro Injectable (ADMELOG) 6 Unit(s) SubCutaneous three times a day before meals  levothyroxine 137 MICROGram(s) Oral daily  metoprolol tartrate 25 milliGRAM(s) Oral every 12 hours  pantoprazole    Tablet 40 milliGRAM(s) Oral daily  polyethylene glycol 3350 17 Gram(s) Oral daily  potassium chloride    Tablet ER 40 milliEquivalent(s) Oral daily  senna 2 Tablet(s) Oral at bedtime  sodium chloride 0.9% lock flush 3 milliLiter(s) IV Push every 8 hours  tamsulosin 0.8 milliGRAM(s) Oral at bedtime  traZODone 100 milliGRAM(s) Oral at bedtime    MEDICATIONS  (PRN):  acetaminophen     Tablet .. 975 milliGRAM(s) Oral every 6 hours PRN Mild Pain (1 - 3)  oxyCODONE    IR 5 milliGRAM(s) Oral every 4 hours PRN Moderate Pain (4 - 6)  oxyCODONE    IR 10 milliGRAM(s) Oral every 4 hours PRN Severe Pain (7 - 10)    Physical Exam  Gen: NAD  Neuro: A&OX3 non focal speech clear and intact  Pulm: decreased BS b/l no wheezing  CV: S1S2 RRR no murmurs  Abd: +BS soft NT ND  Extrem/MS: mild ankle/pedal edema, no cyanosis, WWP, MARIANO  Incision(s): mid sternal dsg C/D/I, sternum stable no click  EPM VVI 50/10/2.0

## 2022-04-15 NOTE — DISCHARGE NOTE PROVIDER - NSDCFUADDAPPT_GEN_ALL_CORE_FT
1. Follow up with Dr. Stewart in 1-2 weeks. Please call [phone] on Monday for an appointment.   2. Follow up with your cardiologist in 2-4 weeks.  3. Follow up with endocrine in 2-4 weeks. 1. Follow up with Dr. Stewart in 1-2 weeks. Please call [phone] on Monday for an appointment.   2. Follow up with your cardiologist in 2-4 weeks.  3. Follow up with endocrine in 2-4 weeks.  4. Follow up with your primary care doctor in 2-4 weeks.    Your Care Navigator Nurse Practitioner will be in touch to see you in your home within a few days from discharge. The Follow Your Heart program can help ensure you understand your medications, discharge instructions and answer any questions you may have at that time. They are also a great source to address concerns during the day and may be reached at [phone].  1. Follow up with Dr. Stewart in 1-2 weeks. Please call [phone] on Monday for an appointment.   The cardiac surgery office is located on the first floor of [facility] at [address] [place] [address]. Please enter through the lobby. A [facility] employee will then direct you where to go.   2. Follow up with your cardiologist in 2-4 weeks.  3. Follow up with endocrine in 2-4 weeks.  4. Follow up with your primary care doctor in 2-4 weeks.    Your Care Navigator Nurse Practitioner will be in touch to see you in your home within a few days from discharge. The Follow Your Heart program can help ensure you understand your medications, discharge instructions and answer any questions you may have at that time. They are also a great source to address concerns during the day and may be reached at [phone].

## 2022-04-15 NOTE — DISCHARGE NOTE PROVIDER - NSDCMRMEDTOKEN_GEN_ALL_CORE_FT
alfuzosin 10 mg oral tablet, extended release: 1 tab(s) orally once a day  Ambien:   Aspirin Enteric Coated 81 mg oral delayed release tablet: 1 tab(s) orally once a day  empagliflozin 10 mg oral tablet: 1 tab(s) orally once a day (in the morning)  escitalopram 20 mg oral tablet: 1 tab(s) orally once a day  famotidine 20 mg oral tablet: 1 tab(s) orally 2 times a day  imiquimod 5% topical cream: Apply topically to affected area 3 times a week  levothyroxine 137 mcg (0.137 mg) oral tablet: 1 tab(s) orally once a day  losartan-hydrochlorothiazide 100 mg-25 mg oral tablet: 1 tab(s) orally once a day  magnesium chloride:   metFORMIN 500 mg oral tablet, extended release: 2 tab(s) orally 2 times a day  Nebivolol 20 mg oral tablet: 1 tab(s) orally once a day  nitroglycerin 0.4 mg sublingual tablet: 1 tab(s) sublingual every 5 minutes  rosuvastatin 10 mg oral tablet: 1 tab(s) orally once a day (at bedtime)  sucralfate 1 g oral tablet: 1 tab(s) orally 4 times a day (before meals and at bedtime)  traZODone 100 mg oral tablet: 1 tab(s) orally once a day (at bedtime)  Turmeric:   Victoza 18 mg/3 mL subcutaneous solution: 1 dose(s) subcutaneous once a day  Vitamin B12: 5000 microgram(s) orally once a day  Vitamin D3 25 mcg (1000 intl units) oral tablet: 1 tab(s) orally once a day   alfuzosin 10 mg oral tablet, extended release: 1 tab(s) orally once a day  Ambien:   aspirin 81 mg oral tablet, chewable: 1 tab(s) orally once a day  atorvastatin 80 mg oral tablet: 1 tab(s) orally once a day (at bedtime)  clopidogrel 75 mg oral tablet: 1 tab(s) orally once a day  dme: glucometer. use as directed.   dispense 1  diagnosis: E11.9  dme: Lancets   Use as directed two times a day before breakfast and at bedtime  dispense 1 month supply   diagnosis E11.9  dme: Glucometer test strips  dispense 1 month supply  Use as directed, two times a day before breakfast and at bedtime  Diagnosis 11.9  dme: alcohol swabs for glucometer  Dispense 1 month supply   Use as directed, two times a day  Diagnosis E11.9  dme: BD Meenu Needles  Use as directed, two times a day  Dispense 1 month supply  Diagnosis E11.9  empagliflozin 10 mg oral tablet: 1 tab(s) orally once a day (in the morning)  escitalopram 20 mg oral tablet: 1 tab(s) orally once a day  famotidine 20 mg oral tablet: 1 tab(s) orally 2 times a day  furosemide 40 mg oral tablet: 1 tab(s) orally once a day  imiquimod 5% topical cream: Apply topically to affected area 3 times a week  Lantus Solostar Pen 100 units/mL subcutaneous solution: 15 unit(s) subcutaneous once a day (at bedtime)   levothyroxine 137 mcg (0.137 mg) oral tablet: 1 tab(s) orally once a day  magnesium chloride:   metFORMIN 500 mg oral tablet, extended release: 2 tab(s) orally 2 times a day  metoprolol tartrate 25 mg oral tablet: 1 tab(s) orally every 12 hours  oxycodone-acetaminophen 5 mg-325 mg oral tablet: 1 tab(s) orally every 6 hours MDD:4  Pacerone 200 mg oral tablet: 1 tab(s) orally once a day   Potassium Chloride (Eqv-K-Tab) 20 mEq oral tablet, extended release: 1 tab(s) orally once a day   senna oral tablet: 2 tab(s) orally once a day (at bedtime)  sucralfate 1 g oral tablet: 1 tab(s) orally 4 times a day (before meals and at bedtime)  traZODone 100 mg oral tablet: 1 tab(s) orally once a day (at bedtime)  Turmeric:   Victoza 18 mg/3 mL subcutaneous solution: 1 dose(s) subcutaneous once a day  Vitamin B12: 5000 microgram(s) orally once a day  Vitamin D3 25 mcg (1000 intl units) oral tablet: 1 tab(s) orally once a day   alfuzosin 10 mg oral tablet, extended release: 1 tab(s) orally once a day  Ambien:   aspirin 81 mg oral tablet, chewable: 1 tab(s) orally once a day  atorvastatin 80 mg oral tablet: 1 tab(s) orally once a day (at bedtime)  clopidogrel 75 mg oral tablet: 1 tab(s) orally once a day  dme: glucometer. use as directed.   dispense 1  diagnosis: E11.9  dme: Lancets   Use as directed two times a day before breakfast and at bedtime  dispense 1 month supply   diagnosis E11.9  dme: Glucometer test strips  dispense 1 month supply  Use as directed, two times a day before breakfast and at bedtime  Diagnosis 11.9  dme: alcohol swabs for glucometer  Dispense 1 month supply   Use as directed, two times a day  Diagnosis E11.9  dme: BD Meenu Needles  Use as directed, two times a day  Dispense 1 month supply  Diagnosis E11.9  empagliflozin 10 mg oral tablet: 1 tab(s) orally once a day (in the morning)  escitalopram 20 mg oral tablet: 1 tab(s) orally once a day  famotidine 20 mg oral tablet: 1 tab(s) orally 2 times a day  freestyle willie 2 sensor:   furosemide 40 mg oral tablet: 1 tab(s) orally once a day  imiquimod 5% topical cream: Apply topically to affected area 3 times a week  Lantus Solostar Pen 100 units/mL subcutaneous solution: 15 unit(s) subcutaneous once a day (at bedtime)   levothyroxine 137 mcg (0.137 mg) oral tablet: 1 tab(s) orally once a day  magnesium chloride:   metFORMIN 500 mg oral tablet, extended release: 2 tab(s) orally 2 times a day  metoprolol tartrate 25 mg oral tablet: 1 tab(s) orally every 12 hours  oxycodone-acetaminophen 5 mg-325 mg oral tablet: 1 tab(s) orally every 6 hours MDD:4  Pacerone 200 mg oral tablet: 1 tab(s) orally once a day   Potassium Chloride (Eqv-K-Tab) 20 mEq oral tablet, extended release: 1 tab(s) orally once a day   senna oral tablet: 2 tab(s) orally once a day (at bedtime)  sucralfate 1 g oral tablet: 1 tab(s) orally 4 times a day (before meals and at bedtime)  traZODone 100 mg oral tablet: 1 tab(s) orally once a day (at bedtime)  Turmeric:   Victoza 18 mg/3 mL subcutaneous solution: 1 dose(s) subcutaneous once a day  Vitamin B12: 5000 microgram(s) orally once a day  Vitamin D3 25 mcg (1000 intl units) oral tablet: 1 tab(s) orally once a day

## 2022-04-15 NOTE — PROGRESS NOTE ADULT - SUBJECTIVE AND OBJECTIVE BOX
INTERVAL HPI/OVERNIGHT EVENTS:   feeling better, ate his lunch    MEDICATIONS  (STANDING):  acetaminophen   IVPB .. 1000 milliGRAM(s) IV Intermittent once  aspirin  chewable 81 milliGRAM(s) Oral once  atorvastatin 80 milliGRAM(s) Oral at bedtime  cefuroxime  IVPB 1500 milliGRAM(s) IV Intermittent every 8 hours  chlorhexidine 0.12% Liquid 15 milliLiter(s) Oral Mucosa every 12 hours  chlorhexidine 2% Cloths 1 Application(s) Topical daily  dextrose 50% Injectable 50 milliLiter(s) IV Push every 15 minutes  dextrose 50% Injectable 25 milliLiter(s) IV Push every 15 minutes  insulin regular Infusion 2 Unit(s)/Hr (2 mL/Hr) IV Continuous <Continuous>  levothyroxine 137 MICROGram(s) Oral daily  magnesium sulfate  IVPB 2 Gram(s) IV Intermittent once  niCARdipine Infusion 5 mG/Hr (25 mL/Hr) IV Continuous <Continuous>  pantoprazole  Injectable 40 milliGRAM(s) IV Push once  potassium chloride  10 mEq/50 mL IVPB 10 milliEquivalent(s) IV Intermittent every 1 hour  potassium chloride  10 mEq/50 mL IVPB 10 milliEquivalent(s) IV Intermittent every 1 hour  potassium chloride  10 mEq/50 mL IVPB 10 milliEquivalent(s) IV Intermittent every 1 hour  potassium chloride  10 mEq/50 mL IVPB 10 milliEquivalent(s) IV Intermittent every 1 hour  propofol Infusion 10 MICROgram(s)/kG/Min (4.08 mL/Hr) IV Continuous <Continuous>  senna 2 Tablet(s) Oral at bedtime  sodium chloride 0.9%. 1000 milliLiter(s) (10 mL/Hr) IV Continuous <Continuous>  sodium chloride 0.9%. 1000 milliLiter(s) (5 mL/Hr) IV Continuous <Continuous>  tamsulosin 0.8 milliGRAM(s) Oral at bedtime  vancomycin  IVPB 1000 milliGRAM(s) IV Intermittent every 12 hours    MEDICATIONS  (PRN):      Allergies    No Known Allergies    Intolerances    ACE inhibitors (Other)  levofloxacin (Joint Pain)      Review of systems:  no cp, no sob, no n/v, some mild chest tenderness    Vital Signs Last 24 Hrs  T(C): 35.2 (11 Apr 2022 13:00), Max: 37.1 (10 Apr 2022 15:30)  T(F): 95.4 (11 Apr 2022 13:00), Max: 98.8 (10 Apr 2022 15:30)  HR: 58 (11 Apr 2022 13:15) (57 - 69)  BP: 164/86 (11 Apr 2022 05:59) (134/73 - 169/84)  BP(mean): --  RR: 10 (11 Apr 2022 13:15) (10 - 18)  SpO2: 100% (11 Apr 2022 13:15) (94% - 100%)    PHYSICAL EXAM:  Constitutional: well-groomed, well-developed  Respiratory: CTAB  Cardiovascular: S1 and S2, RRR, no M/G/R  Gastrointestinal: BS+, soft, no organomegaly or mass  Extremities: No peripheral edema, no pedal lesions  Skin: No rashes, no acanthosis        LABS:                        10.5   12.91 )-----------( 77       ( 11 Apr 2022 12:39 )             30.8     04-11    138  |  103  |  19.0  ----------------------------<  157<H>  4.0   |  24.0  |  0.50    Ca    8.1<L>      11 Apr 2022 12:39  Mg     1.8     04-11    TPro  4.0<L>  /  Alb  2.7<L>  /  TBili  0.6  /  DBili  x   /  AST  53<H>  /  ALT  31  /  AlkPhos  29<L>  04-11

## 2022-04-15 NOTE — DISCHARGE NOTE PROVIDER - CARE PROVIDERS DIRECT ADDRESSES
[email or web address],DirectAddress_Unknown,[email or web address]

## 2022-04-15 NOTE — DISCHARGE NOTE PROVIDER - HOSPITAL COURSE
72M, pmhx HTN, HLD, CAD, prior MI with [id number] and balloon angioplasty, DM (7.5), thyroid Ca s/p thyroidectomy 2018, cholecystitis with sepsis requiring cholecystectomy 2014, incisional hernia repair 2016, gastric bypass, transferred from [place] on 4/8 after p/w acute onset CP, rule in for NSTEMI, cath with multivessel disease. Pt underwent C3L and lD endarterectomy with SVG patch on 4/11/22 with Dr. Stewart. Postop course notable for AF RVR requiring amio load. Currently SR. PW cut at skin on day of discharge. Pt remains hemodynamically stable and ambulating well. Pt is stable for discharge home as per Dr. Mahan (covering for Dr. Stewart).     72M, pmhx HTN, HLD, CAD, prior MI with stents (1983) and balloon angioplasty, DM (A1C 7.5), thyroid Ca s/p thyroidectomy 2018, cholecystitis with sepsis requiring cholecystectomy 2014, incisional hernia repair 2016, gastric bypass, transferred from [place] on 4/8 after p/w acute onset CP, rule in for NSTEMI, cath with multivessel disease. Pt underwent C3L and lD endarterectomy with SVG patch on 4/11/22 with Dr. Stewart. Postop course notable for AF RVR requiring amio load. Currently SR. PW cut at skin on day of discharge. Pt remains hemodynamically stable and ambulating well. Pt is stable for discharge home as per Dr. Mahan (covering for Dr. Stewart).

## 2022-04-15 NOTE — PROGRESS NOTE ADULT - ASSESSMENT
72M with PMHx of MI s/p stents (1983), CAD s/p multiple balloon angioplasties up to a few years ago, DM II (HA1c 7.5),  HLD, HTN, thyroid cancer s/p thyroidectomy, cholecystitis s/p sepsis requiring cholecystectomy, PUD s/p gastric bypass, spinal stenosis, depression presented to the [place] ED 4/8/22 c/o of chest pain, nausea, and regurgitation. Pt underwent cardiac catheterization at [place] showing multivessel disease and was transferred to [facility].  Pt underwent CABG X 3, LAD endarterectomy with SVG patch 4/11/22.  Postop course notable for brief episode of afib (on PO amio).

## 2022-04-15 NOTE — DISCHARGE NOTE PROVIDER - NSDCFUADDINST_GEN_ALL_CORE_FT
Please call the Cardiothoracic Surgery office at [phone] if you are experiencing any shortness of breath, chest pain, fevers or chills, drainage from the incisions, persistent nausea, vomiting or if you have any questions about your medications. If the symptoms are severe, call 911 and go to the nearest hospital. You can also call [phone] for an emergency Health system ambulance, which will take you to the closest [facility].    If you need any assistance for making any appointments for a new consult or referral in any specialty, please call our Health system Clinical Coordination Center at [phone].

## 2022-04-15 NOTE — DISCHARGE NOTE PROVIDER - PROVIDER TOKENS
PROVIDER:[TOKEN:[[id number]]],PROVIDER:[TOKEN:[[id number]]] PROVIDER:[TOKEN:[[id number]]],PROVIDER:[TOKEN:[[id number]]],PROVIDER:[TOKEN:[[id number]]]

## 2022-04-15 NOTE — DISCHARGE NOTE PROVIDER - NSDCCPCAREPLAN_GEN_ALL_CORE_FT
PRINCIPAL DISCHARGE DIAGNOSIS  Diagnosis: CAD (coronary artery disease)  Assessment and Plan of Treatment: status post Coronary artery bypass grafting 4/11/22  1. Take ALL of your medications as ordered. Fill your prescriptions the day you are discharged and take according to the schedule you were given. Continue to take a stool softener if you are taking narcotic pain medications. AVOID medications such as ibuprofen or naproxen if you have had bypass surgery. If you have any questions or are unable to fill the prescriptions, please call the office right away at [phone].  2. Shower daily. Clean all incisions daily while showering with warm water and mild soap, pat dry with a clean towel and do not cover with any dressings unless instructed to. No bathing, swimming in a pool or the ocean until instructed by MD.  DO NOT use creams or lotions on the wound.  3. We advise that you do not drive until instructed by MD.   4. You may not return to work until instructed by MD.   5. Please eat a low fat, low cholesterol, low salt diet. (No added/extra salt)  6. Weigh yourself every day in the morning and record it in the weight log in your red folder. Notify the office of any weight gain more than 2-3 pounds in 24 hours.  7. Continue breathing exercises several times a day. Continue to use your heart pillow.  8. No heavy lifting nothing greater than 5 pounds until cleared by MD.   9. Call / Notify MD any fever greater than 101.0, any drainage from incisions or if they become red, hot or very tender to the touch.  10. Increase activity as tolerated. Walk indoors and/or outdoors at least 3 times a day.      SECONDARY DISCHARGE DIAGNOSES  Diagnosis: HTN (hypertension)  Assessment and Plan of Treatment: take all your medications as presribed. follow up with your cardiologist in 2-4 weeks.    Diagnosis: Depression, major  Assessment and Plan of Treatment:     Diagnosis: HLD (hyperlipidemia)  Assessment and Plan of Treatment: take all your medications as presribed. follow up with your cardiologist in 2-4 weeks.    Diagnosis: Type 2 diabetes mellitus  Assessment and Plan of Treatment: It is extremely important to follow a diabetic (consistent carbohydrates, LOW/NO ADDED SUGAR) diet and monitor your glucose (sugar) levels. You have an increased risk of complications, including wound infections, due to the diabetes.  1. Check your glucoses 2-3 times daily before meals and bedtime.   2. Keep a log of your glucose levels every day.   3. Make an appointment to meet with your primary care provider or endocrinologist within a week.   4. Take ALL of your medications as prescribed. Only hold medications for low glucose levels (< 80) or if you are symptomatic (dizzy, sweating, lightheaded).  5. Ideally, we would like all of the glucose levels to be between .   You may have been discharged on different medications than you were taking before. Your body reacts differently to the medications after surgery. Please continue to take the medications as prescribed and notify the office, nurse practitioner or your PCP if you have any concerns right away.    Diagnosis: S/P thyroidectomy  Assessment and Plan of Treatment: continue your synthroid, follow up with your oncologist     PRINCIPAL DISCHARGE DIAGNOSIS  Diagnosis: CAD (coronary artery disease)  Assessment and Plan of Treatment: status post Coronary artery bypass grafting 4/11/22  1. Take ALL of your medications as ordered. Fill your prescriptions the day you are discharged and take according to the schedule you were given. Continue to take a stool softener if you are taking narcotic pain medications. AVOID medications such as ibuprofen or naproxen if you have had bypass surgery. If you have any questions or are unable to fill the prescriptions, please call the office right away at [phone].  2. Shower daily. Clean all incisions daily while showering with warm water and mild soap, pat dry with a clean towel and do not cover with any dressings unless instructed to. No bathing, swimming in a pool or the ocean until instructed by MD.  DO NOT use creams or lotions on the wound.  3. We advise that you do not drive until instructed by MD.   4. You may not return to work until instructed by MD.   5. Please eat a low fat, low cholesterol, low salt diet. (No added/extra salt)  6. Weigh yourself every day in the morning and record it in the weight log in your red folder. Notify the office of any weight gain more than 2-3 pounds in 24 hours.  7. Continue breathing exercises several times a day. Continue to use your heart pillow.  8. No heavy lifting nothing greater than 5 pounds until cleared by MD.   9. Call / Notify MD any fever greater than 101.0, any drainage from incisions or if they become red, hot or very tender to the touch.  10. Increase activity as tolerated. Walk indoors and/or outdoors at least 3 times a day.      SECONDARY DISCHARGE DIAGNOSES  Diagnosis: Type 2 diabetes mellitus  Assessment and Plan of Treatment: It is extremely important to follow a diabetic (consistent carbohydrates, LOW/NO ADDED SUGAR) diet and monitor your glucose (sugar) levels. You have an increased risk of complications, including wound infections, due to the diabetes.  1. Check your glucoses twice a day, before breakfast and at bedtime.   2. Keep a log of your glucose levels every day.   3. Make an appointment to meet with your primary care provider or endocrinologist within a week.   4. Take ALL of your medications as prescribed. Only hold medications for low glucose levels (< 80) or if you are symptomatic (dizzy, sweating, lightheaded).  5. Ideally, we would like all of the glucose levels to be between .   You may have been discharged on different medications than you were taking before. Your body reacts differently to the medications after surgery. Please continue to take the medications as prescribed and notify the office, nurse practitioner or your PCP if you have any concerns right away.    Diagnosis: HTN (hypertension)  Assessment and Plan of Treatment: take all your medications as presribed. follow up with your cardiologist in 2-4 weeks.    Diagnosis: Depression, major  Assessment and Plan of Treatment:     Diagnosis: HLD (hyperlipidemia)  Assessment and Plan of Treatment: take all your medications as presribed. follow up with your cardiologist in 2-4 weeks.    Diagnosis: S/P thyroidectomy  Assessment and Plan of Treatment: continue your synthroid, follow up with your oncologist     PRINCIPAL DISCHARGE DIAGNOSIS  Diagnosis: CAD (coronary artery disease)  Assessment and Plan of Treatment: status post Coronary artery bypass grafting 4/11/22  1. Take ALL of your medications as ordered. Fill your prescriptions the day you are discharged and take according to the schedule you were given. Continue to take a stool softener if you are taking narcotic pain medications. AVOID medications such as ibuprofen or naproxen if you have had bypass surgery. If you have any questions or are unable to fill the prescriptions, please call the office right away at [phone].  2. Shower daily. Clean all incisions daily while showering with warm water and mild soap, pat dry with a clean towel and do not cover with any dressings unless instructed to. No bathing, swimming in a pool or the ocean until instructed by MD.  DO NOT use creams or lotions on the wound.  3. We advise that you do not drive until instructed by MD.   4. You may not return to work until instructed by MD.   5. Please eat a low fat, low cholesterol, low salt diet. (No added/extra salt)  6. Weigh yourself every day in the morning and record it in the weight log in your red folder. Notify the office of any weight gain more than 2-3 pounds in 24 hours.  7. Continue breathing exercises several times a day. Continue to use your heart pillow.  8. No heavy lifting nothing greater than 5 pounds until cleared by MD.   9. Call / Notify MD any fever greater than 101.0, any drainage from incisions or if they become red, hot or very tender to the touch.  10. Increase activity as tolerated. Walk indoors and/or outdoors at least 3 times a day.      SECONDARY DISCHARGE DIAGNOSES  Diagnosis: Type 2 diabetes mellitus  Assessment and Plan of Treatment: It is extremely important to follow a diabetic (consistent carbohydrates, LOW/NO ADDED SUGAR) diet and monitor your glucose (sugar) levels. You have an increased risk of complications, including wound infections, due to the diabetes.  1. Check your glucoses twice a day, before breakfast and at bedtime.   2. Keep a log of your glucose levels every day.   3. Make an appointment to meet with your primary care provider or endocrinologist within a week.   4. Take ALL of your medications as prescribed. Only hold medications for low glucose levels (< 80) or if you are symptomatic (dizzy, sweating, lightheaded).  5. You are being discharged on Lantus, please only take at bedtime IF your blood glucose is greater than 150.   6. Ideally, we would like all of the glucose levels to be between .   You may have been discharged on different medications than you were taking before. Your body reacts differently to the medications after surgery. Please continue to take the medications as prescribed and notify the office, nurse practitioner or your PCP if you have any concerns right away.    Diagnosis: HTN (hypertension)  Assessment and Plan of Treatment: take all your medications as presribed. follow up with your cardiologist in 2-4 weeks.    Diagnosis: Depression, major  Assessment and Plan of Treatment:     Diagnosis: HLD (hyperlipidemia)  Assessment and Plan of Treatment: take all your medications as presribed. follow up with your cardiologist in 2-4 weeks.    Diagnosis: S/P thyroidectomy  Assessment and Plan of Treatment: continue your synthroid, follow up with your oncologist

## 2022-04-16 ENCOUNTER — TRANSCRIPTION ENCOUNTER (OUTPATIENT)
Age: 72
End: 2022-04-16

## 2022-04-16 VITALS
OXYGEN SATURATION: 97 % | HEART RATE: 72 BPM | RESPIRATION RATE: 18 BRPM | DIASTOLIC BLOOD PRESSURE: 71 MMHG | SYSTOLIC BLOOD PRESSURE: 122 MMHG | TEMPERATURE: 99 F

## 2022-04-16 LAB
ANION GAP SERPL CALC-SCNC: 10 MMOL/L — SIGNIFICANT CHANGE UP (ref 5–17)
BUN SERPL-MCNC: 22.3 MG/DL — HIGH (ref 8–20)
CALCIUM SERPL-MCNC: 8.9 MG/DL — SIGNIFICANT CHANGE UP (ref 8.6–10.2)
CHLORIDE SERPL-SCNC: 100 MMOL/L — SIGNIFICANT CHANGE UP (ref 98–107)
CO2 SERPL-SCNC: 25 MMOL/L — SIGNIFICANT CHANGE UP (ref 22–29)
CREAT SERPL-MCNC: 0.72 MG/DL — SIGNIFICANT CHANGE UP (ref 0.5–1.3)
EGFR: 97 ML/MIN/1.73M2 — SIGNIFICANT CHANGE UP
GLUCOSE BLDC GLUCOMTR-MCNC: 136 MG/DL — HIGH (ref 70–99)
GLUCOSE BLDC GLUCOMTR-MCNC: 227 MG/DL — HIGH (ref 70–99)
GLUCOSE SERPL-MCNC: 121 MG/DL — HIGH (ref 70–99)
HCT VFR BLD CALC: 30.9 % — LOW (ref 39–50)
HGB BLD-MCNC: 9.8 G/DL — LOW (ref 13–17)
MAGNESIUM SERPL-MCNC: 2.1 MG/DL — SIGNIFICANT CHANGE UP (ref 1.6–2.6)
MCHC RBC-ENTMCNC: 26.3 PG — LOW (ref 27–34)
MCHC RBC-ENTMCNC: 31.7 GM/DL — LOW (ref 32–36)
MCV RBC AUTO: 82.8 FL — SIGNIFICANT CHANGE UP (ref 80–100)
PLATELET # BLD AUTO: 155 K/UL — SIGNIFICANT CHANGE UP (ref 150–400)
POTASSIUM SERPL-MCNC: 4.2 MMOL/L — SIGNIFICANT CHANGE UP (ref 3.5–5.3)
POTASSIUM SERPL-SCNC: 4.2 MMOL/L — SIGNIFICANT CHANGE UP (ref 3.5–5.3)
RBC # BLD: 3.73 M/UL — LOW (ref 4.2–5.8)
RBC # FLD: 19.9 % — HIGH (ref 10.3–14.5)
SARS-COV-2 RNA SPEC QL NAA+PROBE: SIGNIFICANT CHANGE UP
SODIUM SERPL-SCNC: 135 MMOL/L — SIGNIFICANT CHANGE UP (ref 135–145)
WBC # BLD: 9.32 K/UL — SIGNIFICANT CHANGE UP (ref 3.8–10.5)
WBC # FLD AUTO: 9.32 K/UL — SIGNIFICANT CHANGE UP (ref 3.8–10.5)

## 2022-04-16 PROCEDURE — 71045 X-RAY EXAM CHEST 1 VIEW: CPT | Mod: 26

## 2022-04-16 PROCEDURE — 99024 POSTOP FOLLOW-UP VISIT: CPT

## 2022-04-16 RX ORDER — METOPROLOL TARTRATE 50 MG
1 TABLET ORAL
Qty: 60 | Refills: 1
Start: 2022-04-16 | End: 2022-06-14

## 2022-04-16 RX ORDER — ATORVASTATIN CALCIUM 80 MG/1
1 TABLET, FILM COATED ORAL
Qty: 30 | Refills: 1
Start: 2022-04-16 | End: 2022-06-14

## 2022-04-16 RX ORDER — FUROSEMIDE 40 MG
1 TABLET ORAL
Qty: 7 | Refills: 0
Start: 2022-04-16 | End: 2022-04-22

## 2022-04-16 RX ORDER — LANOLIN ALCOHOL/MO/W.PET/CERES
5 CREAM (GRAM) TOPICAL ONCE
Refills: 0 | Status: COMPLETED | OUTPATIENT
Start: 2022-04-16 | End: 2022-04-16

## 2022-04-16 RX ORDER — ASPIRIN/CALCIUM CARB/MAGNESIUM 324 MG
1 TABLET ORAL
Qty: 30 | Refills: 1
Start: 2022-04-16 | End: 2022-06-14

## 2022-04-16 RX ORDER — POTASSIUM CHLORIDE 20 MEQ
1 PACKET (EA) ORAL
Qty: 7 | Refills: 0
Start: 2022-04-16 | End: 2022-04-22

## 2022-04-16 RX ORDER — ROSUVASTATIN CALCIUM 5 MG/1
1 TABLET ORAL
Qty: 0 | Refills: 0 | DISCHARGE

## 2022-04-16 RX ORDER — AMIODARONE HYDROCHLORIDE 400 MG/1
1 TABLET ORAL
Qty: 30 | Refills: 1
Start: 2022-04-16 | End: 2022-06-14

## 2022-04-16 RX ORDER — NITROGLYCERIN 6.5 MG
1 CAPSULE, EXTENDED RELEASE ORAL
Qty: 0 | Refills: 0 | DISCHARGE

## 2022-04-16 RX ORDER — NEBIVOLOL HYDROCHLORIDE 5 MG/1
1 TABLET ORAL
Qty: 0 | Refills: 0 | DISCHARGE

## 2022-04-16 RX ORDER — SENNA PLUS 8.6 MG/1
2 TABLET ORAL
Qty: 14 | Refills: 0
Start: 2022-04-16 | End: 2022-04-22

## 2022-04-16 RX ORDER — LOSARTAN/HYDROCHLOROTHIAZIDE 100MG-25MG
1 TABLET ORAL
Qty: 0 | Refills: 0 | DISCHARGE

## 2022-04-16 RX ORDER — CLOPIDOGREL BISULFATE 75 MG/1
1 TABLET, FILM COATED ORAL
Qty: 30 | Refills: 1
Start: 2022-04-16 | End: 2022-06-14

## 2022-04-16 RX ORDER — ENOXAPARIN SODIUM 100 MG/ML
15 INJECTION SUBCUTANEOUS
Qty: 450 | Refills: 0
Start: 2022-04-16 | End: 2022-05-15

## 2022-04-16 RX ADMIN — Medication 6 UNIT(S): at 12:44

## 2022-04-16 RX ADMIN — OXYCODONE HYDROCHLORIDE 5 MILLIGRAM(S): 5 TABLET ORAL at 10:33

## 2022-04-16 RX ADMIN — AMIODARONE HYDROCHLORIDE 400 MILLIGRAM(S): 400 TABLET ORAL at 13:34

## 2022-04-16 RX ADMIN — Medication 6 UNIT(S): at 08:44

## 2022-04-16 RX ADMIN — AMIODARONE HYDROCHLORIDE 400 MILLIGRAM(S): 400 TABLET ORAL at 05:25

## 2022-04-16 RX ADMIN — ESCITALOPRAM OXALATE 20 MILLIGRAM(S): 10 TABLET, FILM COATED ORAL at 13:34

## 2022-04-16 RX ADMIN — CLOPIDOGREL BISULFATE 75 MILLIGRAM(S): 75 TABLET, FILM COATED ORAL at 13:34

## 2022-04-16 RX ADMIN — SODIUM CHLORIDE 3 MILLILITER(S): 9 INJECTION INTRAMUSCULAR; INTRAVENOUS; SUBCUTANEOUS at 14:10

## 2022-04-16 RX ADMIN — OXYCODONE HYDROCHLORIDE 5 MILLIGRAM(S): 5 TABLET ORAL at 11:33

## 2022-04-16 RX ADMIN — Medication 4: at 12:44

## 2022-04-16 RX ADMIN — Medication 40 MILLIEQUIVALENT(S): at 13:34

## 2022-04-16 RX ADMIN — Medication 40 MILLIGRAM(S): at 05:25

## 2022-04-16 RX ADMIN — Medication 81 MILLIGRAM(S): at 13:34

## 2022-04-16 RX ADMIN — PANTOPRAZOLE SODIUM 40 MILLIGRAM(S): 20 TABLET, DELAYED RELEASE ORAL at 13:34

## 2022-04-16 RX ADMIN — Medication 137 MICROGRAM(S): at 05:25

## 2022-04-16 RX ADMIN — SODIUM CHLORIDE 3 MILLILITER(S): 9 INJECTION INTRAMUSCULAR; INTRAVENOUS; SUBCUTANEOUS at 06:10

## 2022-04-16 RX ADMIN — Medication 25 MILLIGRAM(S): at 05:25

## 2022-04-16 RX ADMIN — Medication 5 MILLIGRAM(S): at 01:11

## 2022-04-16 NOTE — PROGRESS NOTE ADULT - PROBLEM SELECTOR PROBLEM 6
S/P thyroidectomy

## 2022-04-16 NOTE — PROGRESS NOTE ADULT - PROBLEM SELECTOR PLAN 6
synthroid
Continue synthroid.
Continue home synthroid as per endo
Continue home synthroid.  Follow up TFTs.
synthroid

## 2022-04-16 NOTE — PROGRESS NOTE ADULT - PROBLEM SELECTOR PLAN 4
Continue high dose statin.
Continue high dose statin.
Continue lipitor
Continue lipitor
Continue high dose statin.

## 2022-04-16 NOTE — PROGRESS NOTE ADULT - ASSESSMENT
72M with PMHx of MI s/p stents (1983), CAD s/p multiple balloon angioplasties up to a few years ago, DM II (HA1c 7.5),  HLD, HTN, thyroid cancer s/p thyroidectomy, cholecystitis s/p sepsis requiring cholecystectomy, PUD s/p gastric bypass, spinal stenosis, depression presented to the [facility] ED 4/8/22 c/o of chest pain, nausea, and regurgitation. Pt underwent cardiac catheterization at [facility] showing multivessel disease and was transferred to [facility].  Pt underwent CABG X 3, LAD endarterectomy with SVG patch 4/11/22.  Postop course notable for brief episode of afib (on PO amio).

## 2022-04-16 NOTE — PROGRESS NOTE ADULT - PROBLEM SELECTOR PLAN 1
S/p CABG X 3, LAD endarterectomy with SVG patch on 4/11.   Neurologically intact.  Hemodynamically stable.  Continue lopressor as tolerated by HR and BP.   Continue aspirin and plavix for acute graft closure prophylaxis.  Continue statin for chronic graft patency prophylaxis.  cont amio for AF RVR postop, currently SR  PW isolated, Cut at skin today   Oxygenating well, coughing and deep breathing exercises/incentive spirometry encouraged.  Continue diuresis PRN, replenish electrolytes PRN to keep K>4 and Mg>2.   Continue with PT, increase activity as tolerated, currently rec home  Tylenol, Oxy PRN for analgesia.  Continue bowel regimen PRN constipation.   d/c inicison staples, d/c home today pending AM labs  Case and plan to be reviewed / discussed with CT Surgery attending / team during AM rounds.
sp CABG  plan ASA 81 and plavix in AM per RK  pain mgmt --well controlled with IV dilaudid ON  tight glucose control  periop abx  I/S, CPT  BB, ASA, statin  OOB to chair ambulate as tolerated  DC CT, deintensify as tolerated  continue to monitor in cticu today  will d/w attending on Am rounds
S/p Cath with multivessel disease and in-stent stenosis.  Preoperative workup for CABG underway.   Continue ASA, Lipitor, and Lopressor as tolerated by HR and BP.   Patient remains chest pain free at this time.   Follow up labs, TTE, Carotid US b/l, PFTs, CXR, UA, etc.   Surgery timing TBD, d/w Dr. Stewart.  Plan to be discussed / reviewed with CT Surgery attending / team during AM rounds.
sp CABG  plan ASA 81 and plavix in AM per RK  pain mgmt --well controlled with IV dilaudid ON  tight glucose control  periop abx  I/S, CPT  BB, ASA, statin  OOB to chair ambulate as tolerated  DC CT, deintensify as tolerated  continue to monitor in cticu today  will d/w attending on Am rounds
S/p Cath with multivessel disease and in-stent stenosis.  Preoperative workup for CABG underway.   Continue ASA, Lipitor, and Lopressor as tolerated by HR and BP. Restarted on home cozaar for blood pressure control.  Patient remains chest pain free at this time.     Surgery planed for Monday  Plan to be discussed / reviewed with CT Surgery attending / team during AM rounds.
S/p CABG X 3, LAD endarterectomy with SVG patch on 4/11.   Neurologically intact.  Hemodynamically stable.  Continue lopressor as tolerated by HR and BP.   Continue aspirin and plavix for acute graft closure prophylaxis.  Continue statin for chronic graft patency prophylaxis.  Brief (10 seconds) postop afib, remains NSR on PO amio.  Oxygenating well, coughing and deep breathing exercises/incentive spirometry encouraged.  Continue diuresis PRN, replenish electrolytes PRN to keep K>4 and Mg>2.   Continue with PT, increase activity as tolerated.  FS AC+HS with coverage for glycemic control.  Tylenol, Oxy PRN for analgesia.  Continue bowel regimen PRN constipation.   Case and plan to be reviewed / discussed with CT Surgery attending / team during AM rounds.
S/p CABG X 3, LAD endarterectomy with SVG patch on 4/11.   Neurologically intact.  Hemodynamically stable.  Continue lopressor as tolerated by HR and BP.   Continue aspirin and plavix for acute graft closure prophylaxis.  Continue statin for chronic graft patency prophylaxis.  cont amio for AF RVR postop, currently SR  EPM remains back up VVI, consider d/c PW today if HR stable  Oxygenating well, coughing and deep breathing exercises/incentive spirometry encouraged.  Continue diuresis PRN, replenish electrolytes PRN to keep K>4 and Mg>2.   Continue with PT, increase activity as tolerated, currently rec home  Tylenol, Oxy PRN for analgesia.  Continue bowel regimen PRN constipation.   Case and plan to be reviewed / discussed with CT Surgery attending / team during AM rounds.

## 2022-04-16 NOTE — PROGRESS NOTE ADULT - PROBLEM SELECTOR PLAN 5
Continue Lexapro.
lexapro
Continue Lexapro.
lexapro
Continue home Lexapro.  Continue home Trazadone as a sleeping aid.
Continue Lexapro.
Continue home Lexapro.  Continue home Trazadone as a sleeping aid.

## 2022-04-16 NOTE — PROGRESS NOTE ADULT - PROBLEM SELECTOR PLAN 3
Continue Lopressor 50 BID as tolerated by HR and BP.   Started Cozaar for HTN
Continue lopressor as tolerated by HR and BP.
Continue Lopressor 25 BID as tolerated by HR and BP.   Holding home Losartan/HCTZ in pre-op setting.
Continue lopressor as tolerated by HR and BP.
start BB in AM
Continue lopressor as tolerated by HR and BP.
start BB in AM

## 2022-04-16 NOTE — PROGRESS NOTE ADULT - PROBLEM SELECTOR PLAN 7
Lovenox and SCDs for DVT prophylaxis.  Protonix for GI prophylaxis.    Plan to be discussed / reviewed with CT Surgery attending / team during AM rounds.

## 2022-04-16 NOTE — PROGRESS NOTE ADULT - PROBLEM SELECTOR PLAN 2
Endo following.   HA1c 7.5 on Victoza, Jardiance, and Metformin as an outpatient.   remains hyperlgycemic, lantus increased, cont premeal, and ISS   per endo, ok to d/c home on preop regimen
Endo following.   HA1c 7.5 on Victoza, Jardiance, and Metformin as an outpatient.   remains hyperlgycemic, lantus increased, cont premeal, and ISS   d/w endo home regimen in preparation for d/c over weekend
endo following  continue RHI gtt per protocol  advance diet to CC/DASH as tolerated  transiton off gtt per protocol this evening
Outpatient medications include Metformin, Jardiance, and Victoza.   Transition to insulin sliding scale for now.   Trend POC glucose to determine long acting insulin requirements  Endo consulted and appreciated
Endo following.   HA1c 7.5 on Victoza, Jardiance, and Metformin as an outpatient.   Fingersticks AC/HS with lantus, premeal, and ISS ordered for BG coverage.
Outpatient medications include Metformin, Jardiance, and Victoza.   Transition to insulin sliding scale for now.   Trend POC glucose to determine long acting insulin requirements  Will consult Endo this morning.  Follow up HA1c.
endo following  advance diet to CC/DASH as tolerated  transiton off gtt per protocol

## 2022-04-16 NOTE — PROGRESS NOTE ADULT - SUBJECTIVE AND OBJECTIVE BOX
POD 5 s/p C3L, LAD coronary endarterectomy with SVG patch, postop course notable for AF    Subjective: no complaints, denies CP, palpitations, SOB, cough, fever, chills, itchiness/rash, diaphoresis, vision changes, HA, dizziness/lightheadedness, numbness/tingling, abd pain, N/V     T(C): 36.9 (04-16-22 @ 01:13), Max: 36.9 (04-16-22 @ 01:13)  HR: 67 (04-16-22 @ 01:13) (64 - 74)  BP: 180/98 (04-16-22 @ 01:13) (112/70 - 180/98)  RR: 18 (04-16-22 @ 01:13) (18 - 19)  SpO2: 99% (04-16-22 @ 01:13) (95% - 100%)    04-15    135  |  102  |  25.9<H>  ----------------------------<  148<H>  4.3   |  24.0  |  0.72    Ca    8.6      15 Apr 2022 05:33  Mg     2.3     04-15                                 8.4    8.51  )-----------( 103      ( 15 Apr 2022 05:33 )             25.7          CAPILLARY BLOOD GLUCOSE  POCT Blood Glucose.: 275 mg/dL (15 Apr 2022 20:45)  POCT Blood Glucose.: 186 mg/dL (15 Apr 2022 17:06)  POCT Blood Glucose.: 180 mg/dL (15 Apr 2022 11:57)  POCT Blood Glucose.: 155 mg/dL (15 Apr 2022 07:49)    I&O's Detail    14 Apr 2022 07:01  -  15 Apr 2022 07:00  --------------------------------------------------------  IN:    Albumin 5%  - 250 mL: 250 mL    Oral Fluid: 940 mL  Total IN: 1190 mL    OUT:    Voided (mL): 2050 mL  Total OUT: 2050 mL  Total NET: -860 mL    15 Apr 2022 07:01  -  16 Apr 2022 02:46  --------------------------------------------------------  IN:    Oral Fluid: 790 mL  Total IN: 790 mL    OUT:  Total OUT: 0 mL  Total NET: 790 mL    Drug Dosing Weight  Height (cm): 165.1 (11 Apr 2022 05:59)  Weight (kg): 68 (11 Apr 2022 05:59)  BMI (kg/m2): 24.9 (11 Apr 2022 05:59)  BSA (m2): 1.75 (11 Apr 2022 05:59)    MEDICATIONS  (STANDING):  aMIOdarone    Tablet 400 milliGRAM(s) Oral every 8 hours  aMIOdarone    Tablet   Oral   aspirin  chewable 81 milliGRAM(s) Oral daily  atorvastatin 80 milliGRAM(s) Oral at bedtime  clopidogrel Tablet 75 milliGRAM(s) Oral daily  enoxaparin Injectable 40 milliGRAM(s) SubCutaneous every 24 hours  escitalopram 20 milliGRAM(s) Oral daily  furosemide    Tablet 40 milliGRAM(s) Oral daily  insulin glargine Injectable (LANTUS) 18 Unit(s) SubCutaneous at bedtime  insulin lispro (ADMELOG) corrective regimen sliding scale   SubCutaneous Before meals and at bedtime  insulin lispro Injectable (ADMELOG) 6 Unit(s) SubCutaneous three times a day before meals  levothyroxine 137 MICROGram(s) Oral daily  metoprolol tartrate 25 milliGRAM(s) Oral every 12 hours  pantoprazole    Tablet 40 milliGRAM(s) Oral daily  polyethylene glycol 3350 17 Gram(s) Oral daily  potassium chloride    Tablet ER 40 milliEquivalent(s) Oral daily  senna 2 Tablet(s) Oral at bedtime  sodium chloride 0.9% lock flush 3 milliLiter(s) IV Push every 8 hours  tamsulosin 0.8 milliGRAM(s) Oral at bedtime  traZODone 100 milliGRAM(s) Oral at bedtime    MEDICATIONS  (PRN):  acetaminophen     Tablet .. 975 milliGRAM(s) Oral every 6 hours PRN Mild Pain (1 - 3)  oxyCODONE    IR 5 milliGRAM(s) Oral every 4 hours PRN Moderate Pain (4 - 6)  oxyCODONE    IR 10 milliGRAM(s) Oral every 4 hours PRN Severe Pain (7 - 10)      Physical Exam  Gen: NAD  Neuro: A&OX3 non focal speech clear and intact  Pulm: decreased BS b/l no wheezing  CV: S1S2 RRR no murmurs  Abd: +BS soft NT ND  Extrem/MS: mild ankle/pedal edema, no cyanosis, WWP, MARIANO  Incision(s): mid sternal inc C/D/I + staples, sternum stable no click, R EVH site C/D/I  V PW isolated, sutures in CT sites

## 2022-04-16 NOTE — DISCHARGE NOTE NURSING/CASE MANAGEMENT/SOCIAL WORK - NSDCPEFALRISK_GEN_ALL_CORE
For information on Fall & Injury Prevention, visit: [email or web address] OR  [email or web address] OR  https://www.cdc.gov/steadi/patient.html

## 2022-04-16 NOTE — DISCHARGE NOTE NURSING/CASE MANAGEMENT/SOCIAL WORK - PATIENT PORTAL LINK FT
You can access the FollowMyHealth Patient Portal offered by [facility] by registering at the following website: [email or web address]. By joining [facility]’s FollowMyHealth portal, you will also be able to view your health information using other applications (apps) compatible with our system.

## 2022-04-16 NOTE — PROGRESS NOTE ADULT - PROBLEM SELECTOR PROBLEM 5
Depression, major

## 2022-04-16 NOTE — DISCHARGE NOTE NURSING/CASE MANAGEMENT/SOCIAL WORK - NSDCFUADDAPPT_GEN_ALL_CORE_FT
1. Follow up with Dr. Stewart in 1-2 weeks. Please call [phone] on Monday for an appointment.   2. Follow up with your cardiologist in 2-4 weeks.  3. Follow up with endocrine in 2-4 weeks.  4. Follow up with your primary care doctor in 2-4 weeks.    Your Care Navigator Nurse Practitioner will be in touch to see you in your home within a few days from discharge. The Follow Your Heart program can help ensure you understand your medications, discharge instructions and answer any questions you may have at that time. They are also a great source to address concerns during the day and may be reached at [phone].

## 2022-04-16 NOTE — PROGRESS NOTE ADULT - NUTRITIONAL ASSESSMENT
This patient has been assessed with a concern for Malnutrition and has been determined to have a diagnosis/diagnoses of Moderate protein-calorie malnutrition.    This patient is being managed with:   Diet Consistent Carbohydrate/No Snacks-  DASH/TLC {Sodium & Cholesterol Restricted} (DASH)  Supplement Feeding Modality:  Oral  Glucerna Shake Cans or Servings Per Day:  3       Frequency:  Three Times a day  Entered: Apr 13 2022 11:08AM    
This patient has been assessed with a concern for Malnutrition and has been determined to have a diagnosis/diagnoses of Moderate protein-calorie malnutrition.    This patient is being managed with:   Diet Consistent Carbohydrate/No Snacks-  DASH/TLC {Sodium & Cholesterol Restricted} (DASH)  Supplement Feeding Modality:  Oral  Glucerna Shake Cans or Servings Per Day:  3       Frequency:  Three Times a day  Entered: Apr 13 2022 11:08AM

## 2022-04-16 NOTE — PROGRESS NOTE ADULT - PROVIDER SPECIALTY LIST ADULT
Endocrinology
Endocrinology
CT Surgery
Endocrinology
CT Surgery

## 2022-04-16 NOTE — PROGRESS NOTE ADULT - REASON FOR ADMISSION
Transfer from [place] for CABG evaluation
Transfer from [place] for CABG evaluation
Transfer from [place] for CABG evaluation
Transfer from [place] for CABG evaluation
Transfer from [place] for CABG evaluation
Transfer from [place] for CABG evaluation
Transfer from [place] for CABG evaluation
Transfer from [place] for CABG evaluation
Transfer from [place] for CABG evaluation
Transfer from [place] for CABG evaluation
Transfer from [place] for CABG evaluation

## 2022-04-17 ENCOUNTER — TRANSCRIPTION ENCOUNTER (OUTPATIENT)
Age: 72
End: 2022-04-17

## 2022-04-18 ENCOUNTER — NON-APPOINTMENT (OUTPATIENT)
Age: 72
End: 2022-04-18

## 2022-04-18 PROBLEM — E78.5 HYPERLIPIDEMIA, UNSPECIFIED: Chronic | Status: ACTIVE | Noted: 2022-04-08

## 2022-04-18 PROBLEM — E11.9 TYPE 2 DIABETES MELLITUS WITHOUT COMPLICATIONS: Chronic | Status: ACTIVE | Noted: 2022-04-08

## 2022-04-18 PROBLEM — F32.9 MAJOR DEPRESSIVE DISORDER, SINGLE EPISODE, UNSPECIFIED: Chronic | Status: ACTIVE | Noted: 2022-04-08

## 2022-04-18 PROBLEM — M17.10 UNILATERAL PRIMARY OSTEOARTHRITIS, UNSPECIFIED KNEE: Chronic | Status: ACTIVE | Noted: 2022-04-08

## 2022-04-18 PROBLEM — I25.10 ATHEROSCLEROTIC HEART DISEASE OF NATIVE CORONARY ARTERY WITHOUT ANGINA PECTORIS: Chronic | Status: ACTIVE | Noted: 2022-04-08

## 2022-04-18 PROBLEM — I10 ESSENTIAL (PRIMARY) HYPERTENSION: Chronic | Status: ACTIVE | Noted: 2022-04-08

## 2022-04-19 ENCOUNTER — APPOINTMENT (OUTPATIENT)
Dept: CARE COORDINATION | Facility: HOME HEALTH | Age: 72
End: 2022-04-19
Payer: MEDICARE

## 2022-04-19 VITALS
SYSTOLIC BLOOD PRESSURE: 110 MMHG | DIASTOLIC BLOOD PRESSURE: 64 MMHG | OXYGEN SATURATION: 97 % | WEIGHT: 155 LBS | BODY MASS INDEX: 25.83 KG/M2 | HEART RATE: 72 BPM | RESPIRATION RATE: 16 BRPM | HEIGHT: 65 IN

## 2022-04-19 DIAGNOSIS — N40.0 BENIGN PROSTATIC HYPERPLASIA WITHOUT LOWER URINARY TRACT SYMPMS: ICD-10-CM

## 2022-04-19 DIAGNOSIS — Z86.79 PERSONAL HISTORY OF OTHER DISEASES OF THE CIRCULATORY SYSTEM: ICD-10-CM

## 2022-04-19 DIAGNOSIS — Z86.39 PERSONAL HISTORY OF OTHER ENDOCRINE, NUTRITIONAL AND METABOLIC DISEASE: ICD-10-CM

## 2022-04-19 DIAGNOSIS — F32.A DEPRESSION, UNSPECIFIED: ICD-10-CM

## 2022-04-19 PROCEDURE — 99024 POSTOP FOLLOW-UP VISIT: CPT

## 2022-04-19 RX ORDER — PNV NO.95/FERROUS FUM/FOLIC AC 28MG-0.8MG
TABLET ORAL
Refills: 0 | Status: ACTIVE | COMMUNITY

## 2022-04-19 RX ORDER — ALFUZOSIN HYDROCHLORIDE 10 MG/1
10 TABLET, EXTENDED RELEASE ORAL
Refills: 0 | Status: ACTIVE | COMMUNITY

## 2022-04-19 RX ORDER — ASPIRIN ENTERIC COATED TABLETS 81 MG 81 MG/1
81 TABLET, DELAYED RELEASE ORAL
Refills: 0 | Status: ACTIVE | COMMUNITY

## 2022-04-19 RX ORDER — FAMOTIDINE 20 MG/1
20 TABLET, FILM COATED ORAL TWICE DAILY
Refills: 0 | Status: ACTIVE | COMMUNITY

## 2022-04-19 RX ORDER — EMPAGLIFLOZIN 10 MG/1
10 TABLET, FILM COATED ORAL
Refills: 0 | Status: ACTIVE | COMMUNITY

## 2022-04-19 RX ORDER — ESCITALOPRAM OXALATE 20 MG/1
20 TABLET ORAL
Refills: 0 | Status: ACTIVE | COMMUNITY

## 2022-04-19 RX ORDER — OXYCODONE AND ACETAMINOPHEN 5; 325 MG/1; MG/1
5-325 TABLET ORAL
Refills: 0 | Status: ACTIVE | COMMUNITY

## 2022-04-19 RX ORDER — CLOPIDOGREL BISULFATE 75 MG/1
75 TABLET, FILM COATED ORAL
Refills: 0 | Status: ACTIVE | COMMUNITY

## 2022-04-19 RX ORDER — LEVOTHYROXINE SODIUM 0.14 MG/1
137 TABLET ORAL
Refills: 0 | Status: ACTIVE | COMMUNITY

## 2022-04-19 RX ORDER — TRAZODONE HYDROCHLORIDE 100 MG/1
100 TABLET ORAL
Refills: 0 | Status: ACTIVE | COMMUNITY

## 2022-04-19 NOTE — HISTORY OF PRESENT ILLNESS
[FreeTextEntry1] : 72M, pmhx HTN, HLD, CAD, prior MI with stents (1983) and balloon angioplasty,\par DM (A1C 7.5), thyroid Ca s/p thyroidectomy 2018, cholecystitis with sepsis\par requiring cholecystectomy 2014, incisional hernia repair 2016, gastric bypass,\par transferred from [place] on 4/8 after p/w acute onset CP, rule in for NSTEMI,\par cath with multivessel disease. Pt underwent C3L and lD endarterectomy with SVG\par patch on 4/11/22 with Dr. Stewart. Postop course notable for AF RVR requiring\par amio load. Currently SR. PW cut at skin on day of discharge. Pt remained hemodynamically stable and discharged home with support of spouse/family, home care services and the [facility] team. Initial visit completed in home\par CC "I'm feeling a lot better"\par

## 2022-04-19 NOTE — PHYSICAL EXAM
[Sclera] : the sclera and conjunctiva were normal [Neck Appearance] : the appearance of the neck was normal [Respiration, Rhythm And Depth] : normal respiratory rhythm and effort [Exaggerated Use Of Accessory Muscles For Inspiration] : no accessory muscle use [Auscultation Breath Sounds / Voice Sounds] : lungs were clear to auscultation bilaterally [Apical Impulse] : the apical impulse was normal [Heart Rate And Rhythm] : heart rate was normal and rhythm regular [Heart Sounds] : normal S1 and S2 [Murmurs] : no murmurs [Chest Visual Inspection Thoracic Asymmetry] : no chest asymmetry [1+] : left 1+ [Bowel Sounds] : normal bowel sounds [Abdomen Soft] : soft [Abnormal Walk] : normal gait [Skin Color & Pigmentation] : normal skin color and pigmentation [Skin Turgor] : normal skin turgor [] : no rash [Oriented To Time, Place, And Person] : oriented to person, place, and time [Impaired Insight] : insight and judgment were intact [Affect] : the affect was normal [FreeTextEntry2] : B/L LE with +2 pedal edema, B/L calves soft, NT [FreeTextEntry1] : baseline b/l hand tremors

## 2022-04-19 NOTE — REASON FOR VISIT
[Follow-Up: _____] : a [unfilled] follow-up visit [Spouse] : spouse [FreeTextEntry1] : FOLLOW YOUR HEART- Transitional Care Management Program- [facility]\par \par

## 2022-04-19 NOTE — REVIEW OF SYSTEMS
[Lower Ext Edema] : lower extremity edema [Negative] : Psychiatric [Heart Rate Is Slow] : the heart rate was not slow [Heart Rate Is Fast] : the heart rate was not fast [Chest Pain] : no chest pain [Palpitations] : no palpitations [Leg Claudication] : no intermittent leg claudication [FreeTextEntry7] : last BM this AM

## 2022-04-19 NOTE — HISTORY OF PRESENT ILLNESS
[FreeTextEntry1] : 72M, pmhx HTN, HLD, CAD, prior MI with stents (1983) and balloon angioplasty,\par DM (A1C 7.5), thyroid Ca s/p thyroidectomy 2018, cholecystitis with sepsis\par requiring cholecystectomy 2014, incisional hernia repair 2016, gastric bypass,\par transferred from [place] on 4/8 after p/w acute onset CP, rule in for NSTEMI,\par cath with multivessel disease. Pt underwent C3L and lD endarterectomy with SVG\par patch on 4/11/22 with Dr. Stewart. Postop course notable for AF RVR requiring\par amio load. Currently SR. PW cut at skin on day of discharge. Pt remained hemodynamically stable and discharged home with support of spouse/family, home care services and the [facility] team. Initial visit completed in home\par CC "I'm feeling a lot better"\par

## 2022-04-19 NOTE — REASON FOR VISIT
[Follow-Up: _____] : a [unfilled] follow-up visit [Spouse] : spouse [FreeTextEntry1] : FOLLOW YOUR HEART- Transitional Care Management Program- [facility]\par \par

## 2022-04-19 NOTE — ASSESSMENT
[FreeTextEntry1] : Pt recovering well at home s/p OHS. Reviewed all medications and dosages with pt understanding. Pt has all medications in home and is taking as prescribed. Pain controlled with current medication regimen. No new symptoms, issues or concerns to report at this time.\par Average BG reading 116 with wearable glucometer over past two days. Reviewed heart healthy and diabetic diet with pt understanding.

## 2022-04-21 LAB — SURGICAL PATHOLOGY STUDY: SIGNIFICANT CHANGE UP

## 2022-04-26 PROCEDURE — 36415 COLL VENOUS BLD VENIPUNCTURE: CPT

## 2022-04-26 PROCEDURE — C1769: CPT

## 2022-04-26 PROCEDURE — 84436 ASSAY OF TOTAL THYROXINE: CPT

## 2022-04-26 PROCEDURE — 83880 ASSAY OF NATRIURETIC PEPTIDE: CPT

## 2022-04-26 PROCEDURE — 84443 ASSAY THYROID STIM HORMONE: CPT

## 2022-04-26 PROCEDURE — P9016: CPT

## 2022-04-26 PROCEDURE — 71045 X-RAY EXAM CHEST 1 VIEW: CPT

## 2022-04-26 PROCEDURE — 82962 GLUCOSE BLOOD TEST: CPT

## 2022-04-26 PROCEDURE — 80053 COMPREHEN METABOLIC PANEL: CPT

## 2022-04-26 PROCEDURE — 84295 ASSAY OF SERUM SODIUM: CPT

## 2022-04-26 PROCEDURE — 71250 CT THORAX DX C-: CPT

## 2022-04-26 PROCEDURE — 83735 ASSAY OF MAGNESIUM: CPT

## 2022-04-26 PROCEDURE — 93312 ECHO TRANSESOPHAGEAL: CPT

## 2022-04-26 PROCEDURE — 80076 HEPATIC FUNCTION PANEL: CPT

## 2022-04-26 PROCEDURE — 86900 BLOOD TYPING SEROLOGIC ABO: CPT

## 2022-04-26 PROCEDURE — 94760 N-INVAS EAR/PLS OXIMETRY 1: CPT

## 2022-04-26 PROCEDURE — U0005: CPT

## 2022-04-26 PROCEDURE — 85610 PROTHROMBIN TIME: CPT

## 2022-04-26 PROCEDURE — 81001 URINALYSIS AUTO W/SCOPE: CPT

## 2022-04-26 PROCEDURE — 86800 THYROGLOBULIN ANTIBODY: CPT

## 2022-04-26 PROCEDURE — 84484 ASSAY OF TROPONIN QUANT: CPT

## 2022-04-26 PROCEDURE — 85576 BLOOD PLATELET AGGREGATION: CPT

## 2022-04-26 PROCEDURE — C8929: CPT

## 2022-04-26 PROCEDURE — 85014 HEMATOCRIT: CPT

## 2022-04-26 PROCEDURE — 86923 COMPATIBILITY TEST ELECTRIC: CPT

## 2022-04-26 PROCEDURE — 93005 ELECTROCARDIOGRAM TRACING: CPT

## 2022-04-26 PROCEDURE — 82330 ASSAY OF CALCIUM: CPT

## 2022-04-26 PROCEDURE — 82803 BLOOD GASES ANY COMBINATION: CPT

## 2022-04-26 PROCEDURE — 85018 HEMOGLOBIN: CPT

## 2022-04-26 PROCEDURE — 83036 HEMOGLOBIN GLYCOSYLATED A1C: CPT

## 2022-04-26 PROCEDURE — 82550 ASSAY OF CK (CPK): CPT

## 2022-04-26 PROCEDURE — 84439 ASSAY OF FREE THYROXINE: CPT

## 2022-04-26 PROCEDURE — 93880 EXTRACRANIAL BILAT STUDY: CPT

## 2022-04-26 PROCEDURE — P9045: CPT

## 2022-04-26 PROCEDURE — 82435 ASSAY OF BLOOD CHLORIDE: CPT

## 2022-04-26 PROCEDURE — 86850 RBC ANTIBODY SCREEN: CPT

## 2022-04-26 PROCEDURE — 86803 HEPATITIS C AB TEST: CPT

## 2022-04-26 PROCEDURE — C1751: CPT

## 2022-04-26 PROCEDURE — 86901 BLOOD TYPING SEROLOGIC RH(D): CPT

## 2022-04-26 PROCEDURE — 97116 GAIT TRAINING THERAPY: CPT

## 2022-04-26 PROCEDURE — 88304 TISSUE EXAM BY PATHOLOGIST: CPT

## 2022-04-26 PROCEDURE — 85027 COMPLETE CBC AUTOMATED: CPT

## 2022-04-26 PROCEDURE — 84134 ASSAY OF PREALBUMIN: CPT

## 2022-04-26 PROCEDURE — 82947 ASSAY GLUCOSE BLOOD QUANT: CPT

## 2022-04-26 PROCEDURE — 94010 BREATHING CAPACITY TEST: CPT

## 2022-04-26 PROCEDURE — 87640 STAPH A DNA AMP PROBE: CPT

## 2022-04-26 PROCEDURE — 83605 ASSAY OF LACTIC ACID: CPT

## 2022-04-26 PROCEDURE — 84132 ASSAY OF SERUM POTASSIUM: CPT

## 2022-04-26 PROCEDURE — C1889: CPT

## 2022-04-26 PROCEDURE — 88311 DECALCIFY TISSUE: CPT

## 2022-04-26 PROCEDURE — 84100 ASSAY OF PHOSPHORUS: CPT

## 2022-04-26 PROCEDURE — 82553 CREATINE MB FRACTION: CPT

## 2022-04-26 PROCEDURE — 36430 TRANSFUSION BLD/BLD COMPNT: CPT

## 2022-04-26 PROCEDURE — 80048 BASIC METABOLIC PNL TOTAL CA: CPT

## 2022-04-26 PROCEDURE — 87641 MR-STAPH DNA AMP PROBE: CPT

## 2022-04-26 PROCEDURE — 85730 THROMBOPLASTIN TIME PARTIAL: CPT

## 2022-04-26 PROCEDURE — 97530 THERAPEUTIC ACTIVITIES: CPT

## 2022-04-26 PROCEDURE — U0003: CPT

## 2022-04-26 PROCEDURE — 93320 DOPPLER ECHO COMPLETE: CPT

## 2022-04-26 PROCEDURE — 84480 ASSAY TRIIODOTHYRONINE (T3): CPT

## 2022-04-26 PROCEDURE — 97163 PT EVAL HIGH COMPLEX 45 MIN: CPT

## 2022-04-26 PROCEDURE — 94002 VENT MGMT INPAT INIT DAY: CPT

## 2022-04-26 PROCEDURE — 93325 DOPPLER ECHO COLOR FLOW MAPG: CPT

## 2022-04-26 PROCEDURE — 93306 TTE W/DOPPLER COMPLETE: CPT

## 2022-04-29 ENCOUNTER — APPOINTMENT (OUTPATIENT)
Dept: ENDOCRINOLOGY | Facility: CLINIC | Age: 72
End: 2022-04-29
Payer: MEDICARE

## 2022-04-29 VITALS
HEIGHT: 65 IN | TEMPERATURE: 97 F | DIASTOLIC BLOOD PRESSURE: 60 MMHG | SYSTOLIC BLOOD PRESSURE: 112 MMHG | OXYGEN SATURATION: 97 % | HEART RATE: 82 BPM | BODY MASS INDEX: 27.32 KG/M2 | WEIGHT: 164 LBS | RESPIRATION RATE: 15 BRPM

## 2022-04-29 DIAGNOSIS — Z78.9 OTHER SPECIFIED HEALTH STATUS: ICD-10-CM

## 2022-04-29 PROCEDURE — 99215 OFFICE O/P EST HI 40 MIN: CPT

## 2022-04-29 RX ORDER — ATORVASTATIN CALCIUM 80 MG/1
80 TABLET, FILM COATED ORAL
Refills: 0 | Status: COMPLETED | COMMUNITY
End: 2022-04-29

## 2022-04-29 RX ORDER — FUROSEMIDE 40 MG/1
40 TABLET ORAL
Refills: 0 | Status: COMPLETED | COMMUNITY
End: 2022-04-29

## 2022-04-29 RX ORDER — METFORMIN HYDROCHLORIDE 500 MG/1
500 TABLET, COATED ORAL
Refills: 0 | Status: COMPLETED | COMMUNITY
End: 2022-04-29

## 2022-04-29 RX ORDER — NEBIVOLOL 5 MG/1
5 TABLET ORAL
Refills: 0 | Status: ACTIVE | COMMUNITY
Start: 2022-04-29

## 2022-04-29 RX ORDER — METOPROLOL TARTRATE 25 MG/1
25 TABLET, FILM COATED ORAL TWICE DAILY
Qty: 60 | Refills: 0 | Status: COMPLETED | COMMUNITY
End: 2022-04-29

## 2022-04-29 RX ORDER — SUCRALFATE 1 G/10ML
1 SUSPENSION ORAL
Refills: 0 | Status: ACTIVE | COMMUNITY
Start: 2022-04-29

## 2022-04-29 RX ORDER — POTASSIUM CHLORIDE 1500 MG/1
20 TABLET, FILM COATED, EXTENDED RELEASE ORAL
Refills: 0 | Status: COMPLETED | COMMUNITY
End: 2022-04-29

## 2022-05-02 PROBLEM — Z86.79 HISTORY OF CORONARY ARTERY DISEASE: Status: RESOLVED | Noted: 2022-05-02 | Resolved: 2022-05-02

## 2022-05-02 PROBLEM — I25.2 STATUS POST NON-ST ELEVATION MYOCARDIAL INFARCTION (NSTEMI): Status: ACTIVE | Noted: 2022-05-02

## 2022-05-03 ENCOUNTER — APPOINTMENT (OUTPATIENT)
Dept: CARDIOTHORACIC SURGERY | Facility: CLINIC | Age: 72
End: 2022-05-03
Payer: MEDICARE

## 2022-05-03 VITALS
DIASTOLIC BLOOD PRESSURE: 78 MMHG | RESPIRATION RATE: 18 BRPM | HEIGHT: 65 IN | WEIGHT: 156 LBS | OXYGEN SATURATION: 99 % | HEART RATE: 69 BPM | BODY MASS INDEX: 25.99 KG/M2 | SYSTOLIC BLOOD PRESSURE: 140 MMHG

## 2022-05-03 DIAGNOSIS — I25.2 OLD MYOCARDIAL INFARCTION: ICD-10-CM

## 2022-05-03 DIAGNOSIS — Z86.79 PERSONAL HISTORY OF OTHER DISEASES OF THE CIRCULATORY SYSTEM: ICD-10-CM

## 2022-05-03 PROCEDURE — 99024 POSTOP FOLLOW-UP VISIT: CPT

## 2022-05-03 RX ORDER — SUCRALFATE 1 G/1
1 TABLET ORAL 4 TIMES DAILY
Refills: 0 | Status: COMPLETED | COMMUNITY
End: 2022-05-03

## 2022-05-03 RX ORDER — IMIQUIMOD 50 MG/G
5 CREAM TOPICAL
Refills: 0 | Status: COMPLETED | COMMUNITY
End: 2022-05-03

## 2022-05-16 ENCOUNTER — TRANSCRIPTION ENCOUNTER (OUTPATIENT)
Age: 72
End: 2022-05-16

## 2022-05-20 ENCOUNTER — APPOINTMENT (OUTPATIENT)
Dept: ENDOCRINOLOGY | Facility: CLINIC | Age: 72
End: 2022-05-20
Payer: MEDICARE

## 2022-05-20 PROCEDURE — 97802 MEDICAL NUTRITION INDIV IN: CPT

## 2022-05-26 ENCOUNTER — APPOINTMENT (OUTPATIENT)
Dept: SURGERY | Facility: CLINIC | Age: 72
End: 2022-05-26
Payer: MEDICARE

## 2022-05-26 VITALS
WEIGHT: 150 LBS | HEIGHT: 65 IN | HEART RATE: 65 BPM | OXYGEN SATURATION: 97 % | TEMPERATURE: 98.6 F | RESPIRATION RATE: 15 BRPM | DIASTOLIC BLOOD PRESSURE: 80 MMHG | SYSTOLIC BLOOD PRESSURE: 122 MMHG | BODY MASS INDEX: 24.99 KG/M2

## 2022-05-26 DIAGNOSIS — Z78.9 OTHER SPECIFIED HEALTH STATUS: ICD-10-CM

## 2022-05-26 DIAGNOSIS — I25.10 ATHEROSCLEROTIC HEART DISEASE OF NATIVE CORONARY ARTERY W/OUT ANGINA PECTORIS: ICD-10-CM

## 2022-05-26 DIAGNOSIS — Z87.891 PERSONAL HISTORY OF NICOTINE DEPENDENCE: ICD-10-CM

## 2022-05-26 PROCEDURE — 99204 OFFICE O/P NEW MOD 45 MIN: CPT

## 2022-05-26 RX ORDER — AMIODARONE HYDROCHLORIDE 200 MG/1
200 TABLET ORAL
Refills: 0 | Status: COMPLETED | COMMUNITY
End: 2022-05-26

## 2022-05-26 RX ORDER — ADHESIVE TAPE 3"X 2.3 YD
50 MCG TAPE, NON-MEDICATED TOPICAL
Refills: 0 | Status: COMPLETED | COMMUNITY
End: 2022-05-26

## 2022-05-26 RX ORDER — INSULIN GLARGINE 100 [IU]/ML
100 INJECTION, SOLUTION SUBCUTANEOUS
Refills: 0 | Status: COMPLETED | COMMUNITY
End: 2022-05-26

## 2022-05-26 RX ORDER — TORSEMIDE 20 MG/1
20 TABLET ORAL
Refills: 0 | Status: COMPLETED | COMMUNITY
End: 2022-05-26

## 2022-05-26 RX ORDER — SENNOSIDES 8.6 MG
8.6 TABLET ORAL
Refills: 0 | Status: COMPLETED | COMMUNITY
End: 2022-05-26

## 2022-05-26 NOTE — ASSESSMENT
[FreeTextEntry1] : Good pulses to feet. PT present on both sides. DP absent both LE.\par Total time >48 minutes.

## 2022-05-26 NOTE — REASON FOR VISIT
[Consultation] : a consultation visit [Family Member] : family member [FreeTextEntry1] : Atherosclerosis

## 2022-05-26 NOTE — REVIEW OF SYSTEMS
[Limb Swelling] : limb swelling [Fever] : no fever [Chills] : no chills [Eye Pain] : no eye pain [Earache] : no earache [Chest Pain] : no chest pain [Shortness Of Breath] : no shortness of breath [Abdominal Pain] : no abdominal pain [Convulsions] : no convulsions [Easy Bleeding] : no tendency for easy bleeding [Easy Bruising] : no tendency for easy bruising [FreeTextEntry9] : R leg swellin post CABG. L vein harvest.

## 2022-05-26 NOTE — CONSULT LETTER
[Dear  ___] : Dear  [unfilled], [Consult Letter:] : I had the pleasure of evaluating your patient, [unfilled]. [Please see my note below.] : Please see my note below. [Consult Closing:] : Thank you very much for allowing me to participate in the care of this patient.  If you have any questions, please do not hesitate to contact me. [Sincerely,] : Sincerely, [DrNikita  ___] : Dr. PALMER [FreeTextEntry3] : Wm Jonathan LOZANO

## 2022-05-26 NOTE — HISTORY OF PRESENT ILLNESS
[de-identified] : 73 yo male post cardiac surgery 3 months ago. Dr Kothari did not feel peripheral pulses. Pt has low back pain. I am asked to see him for vascular evaluation.

## 2022-05-26 NOTE — PHYSICAL EXAM
[Normal Breath Sounds] : Normal breath sounds [Normal Heart Sounds] : normal heart sounds [2+] : left 2+ [No HSM] : no hepatosplenomegaly [Alert] : alert [Oriented to Person] : oriented to person [Oriented to Place] : oriented to place [Oriented to Time] : oriented to time [Calm] : calm [JVD] : no jugular venous distention  [de-identified] : NAD [de-identified] : NC/At PER [de-identified] : soft, NL BS. No AAA [de-identified] : no CVA tenderness [de-identified] : moves all 4 extr

## 2022-06-23 ENCOUNTER — NON-APPOINTMENT (OUTPATIENT)
Age: 72
End: 2022-06-23

## 2022-08-09 ENCOUNTER — APPOINTMENT (OUTPATIENT)
Dept: ENDOCRINOLOGY | Facility: CLINIC | Age: 72
End: 2022-08-09

## 2022-08-09 ENCOUNTER — NON-APPOINTMENT (OUTPATIENT)
Age: 72
End: 2022-08-09

## 2022-08-09 ENCOUNTER — APPOINTMENT (OUTPATIENT)
Dept: OPHTHALMOLOGY | Facility: CLINIC | Age: 72
End: 2022-08-09

## 2022-08-09 LAB — GLUCOSE BLDC GLUCOMTR-MCNC: 96

## 2022-08-09 PROCEDURE — 99214 OFFICE O/P EST MOD 30 MIN: CPT | Mod: 25

## 2022-08-09 PROCEDURE — 82962 GLUCOSE BLOOD TEST: CPT

## 2022-08-09 PROCEDURE — 92014 COMPRE OPH EXAM EST PT 1/>: CPT

## 2022-08-09 PROCEDURE — 92083 EXTENDED VISUAL FIELD XM: CPT

## 2022-08-09 PROCEDURE — 92250 FUNDUS PHOTOGRAPHY W/I&R: CPT

## 2022-08-09 PROCEDURE — 95251 CONT GLUC MNTR ANALYSIS I&R: CPT

## 2022-08-10 ENCOUNTER — TRANSCRIPTION ENCOUNTER (OUTPATIENT)
Age: 72
End: 2022-08-10

## 2022-08-24 ENCOUNTER — APPOINTMENT (OUTPATIENT)
Dept: NEUROSURGERY | Facility: CLINIC | Age: 72
End: 2022-08-24

## 2022-08-24 VITALS
WEIGHT: 155 LBS | TEMPERATURE: 96.8 F | HEART RATE: 71 BPM | BODY MASS INDEX: 25.83 KG/M2 | OXYGEN SATURATION: 99 % | DIASTOLIC BLOOD PRESSURE: 73 MMHG | HEIGHT: 65 IN | SYSTOLIC BLOOD PRESSURE: 145 MMHG

## 2022-08-24 PROCEDURE — 99204 OFFICE O/P NEW MOD 45 MIN: CPT

## 2022-08-25 NOTE — CONSULT LETTER
[Dear  ___] : Dear  [unfilled], [Courtesy Letter:] : I had the pleasure of seeing your patient, [unfilled], in my office today. [Sincerely,] : Sincerely, [FreeTextEntry2] : Beka Abarca MD\par [address] [facility]\[place] [address]\par  [FreeTextEntry1] : Mr. Olvera is a very pleasant 72-year-old male patient who was seen in our office today in regards to several neurologic complaints.\par \par Firstly, the patient has a 4-month history of left-sided hand numbness which occurred shortly after cardiac bypass surgery in April 2022.  The patient's symptoms most prominently affects the last 3 digits of his left hand.  The patient denies any numbness or tingling in the hands otherwise.  The patient denies any increasing clumsiness in either hand.  The patient states that he was diagnosed with essential tremor which has been successfully treated recently.  The patient additionally complains of low back pain with severe leg pain with activity.  The patient does not complain of lower extremity pain at rest.  The patient endorses difficulty walking secondary to pain.\par \par The patient endorses a past medical history including hypertension, diabetes, hypercholesterolemia, remote history of thyroid cancer, depression, and coronary artery disease.  The patient's current medical regimen includes Myrbetriq, alfuzosin, Crestor, amiodarone, famotidine, trazodone, metformin, Jardiance, finasteride, Lexapro, aspirin, levothyroxine, propranolol, and Victoza.  The patient has an intolerance to ACE inhibitors which cause a cough and levofloxacin which causes knee pain.\par \par On examination, the patient is alert, oriented, and compliant with the exam.  The patient demonstrates full strength in the upper and lower extremities bilaterally.  The patient demonstrates 3+ reflexes at the patellar tendons in the lower extremities bilaterally.  The patient demonstrates 2+ reflexes at the Achilles tendons in the lower extremities bilaterally.  The patient has several beats of clonus on the right lower extremity.  The patient is unable to tandem walk. The patient does not have a Luiz sign.\par \par The patient is accompanied with an MRI scan of the cervical spine dated June 7, 2022.  These images demonstrate multilevel degenerative changes from C3-7.  Bilateral foraminal stenosis is noted at C4/5 and C5/6.  Moderate central cervical stenosis is noted at C3-C5 and severe central cervical stenosis is noted at C5/6. Loss of cervical stenosis is also noted on his supine MRI scans. Myelomalacia is noted at C5/6. \par \par Taken together, the patient has a clinical history and radiographic findings most consistent with cervical stenosis and a myelopathy.  The patient states that his upper extremity symptoms are not significant at this time, but the patient has physical findings and a clinical history consistent with a worsening ataxia.  I have explained the clinical situation with respect to his cervical spine to the patient and explained to the patient that he is a surgical candidate for a laminoplasty procedure.  The reasoning for a posterior approach is because of the patient's previous thyroidectomy anteriorly.  The patient is aware that a posterior approach such as laminoplasty is more painful generally that an anterior approach and does carry a risk of cervical kyphosis if the patient is not diligent with physical therapy and exercises postoperatively.  At this time, the patient would like to return home to discuss the situation with his friends and family.  The patient a clinical history suggestive of neurogenic claudication.  The patient has a report from February 2022 of a lumbar spine MRI scan.  Unfortunately, I do not have access to the source images to review.  I explained to the patient that there may be a surgical option in the lower lumbar spine but deferred any surgical discussion until I have access to the source images.  At this time, the patient will obtain a CD with his MRI scan of the lumbar spine and will return to our office in short order to discuss surgical options for his neck again as well as possible surgical options for his lumbar spine.  [FreeTextEntry3] : Manny Keller MD, PhD, CS, FAANS Attending Neurosurgeon  of Neurosurgery [facility] at [facility] at [address], [place] [address] Office: [phone] Fax: [phone]

## 2022-09-20 ENCOUNTER — APPOINTMENT (OUTPATIENT)
Dept: NEUROSURGERY | Facility: CLINIC | Age: 72
End: 2022-09-20

## 2022-09-20 VITALS
SYSTOLIC BLOOD PRESSURE: 110 MMHG | OXYGEN SATURATION: 99 % | HEART RATE: 70 BPM | DIASTOLIC BLOOD PRESSURE: 61 MMHG | TEMPERATURE: 97.5 F

## 2022-09-20 PROCEDURE — 99215 OFFICE O/P EST HI 40 MIN: CPT

## 2022-09-22 NOTE — CONSULT LETTER
[Dear  ___] : Dear  [unfilled], [Courtesy Letter:] : I had the pleasure of seeing your patient, [unfilled], in my office today. [Sincerely,] : Sincerely, [FreeTextEntry2] : Beka Abarca MD [address] [place], [place] [id number]  [FreeTextEntry1] : Mr. Olvera is a very pleasant 72-year-old male patient who was seen in our office today to review his lumbar spine imaging and to discuss surgical options for his cervical spine.\par \par Unfortunately, the patient continues to experience numbness in the last 3 digits of his left hand and continues to complain of increasing difficulty walking.  The patient also has lower extremity symptoms and low back pain.  The patient now complains of increasing clumsiness in his hands and mild difficulty doing buttons on his shirt.\par \par On examination, the patient is alert, oriented, and compliant with the exam.  The patient has several beats of clonus on the right lower extremity but not the left.  The patient is unable to tandem walk.  The patient does not have a Luiz sign in either hand.  The patient demonstrates 3+ reflexes at the patellar tendons bilaterally and 2+ reflexes at the Achilles tendons bilaterally.  The patient is unable to tandem walk. \par \par The patient is accompanied with a MRI scan of the cervical spine from June 7, 2022 which was reviewed previously.  The patient demonstrates severe stenosis at C5/6 with myelomalacia.  The patient additionally has central stenosis to varying degrees from C3-7.  The patient is also accompanied with an MRI scan of the lumbar spine dated February 3, 2022.  These images demonstrate mild to moderate degenerative changes in the lumbar spine without overt nerve root or cauda equina compression.\par \par Taken together, the patient has a clinical history and radiographic findings most consistent with cervical stenosis and myelopathy.  The patient appears to be progressively worsening neurologically and currently would like to consider surgical options for his neck.  I have explained to the patient that there are no obvious surgical lesions of the lumbar spine and that his low back pain and lower extremity pain is more likely related to musculoskeletal causes.  However, I did  the patient that his difficulty walking is likely related to his cervical stenosis and myelopathy given his pathologic reflexes, hyperreflexia, and ataxia.  The patient's upper extremity symptoms appear mild at this time with regards to clumsiness but the symptoms are new.  Based on his clinical situation, I have recommended a laminoplasty given his history of thyroid surgery anteriorly and his multilevel cervical stenosis.  The risks and benefits of the procedure were explained in detail to the patient at this time and the patient has opted to proceed with surgical intervention.  We will endeavor to obtain a surgical date at the patient's earliest convenience.\par \par  [FreeTextEntry3] : Manny Keller MD, PhD, CS, FAANS Attending Neurosurgeon  of Neurosurgery [facility] at [facility] at [address], [place] [address] Office: [phone] Fax: [phone]

## 2022-10-11 ENCOUNTER — OUTPATIENT (OUTPATIENT)
Dept: OUTPATIENT SERVICES | Facility: HOSPITAL | Age: 72
LOS: 1 days | End: 2022-10-11
Payer: MEDICARE

## 2022-10-11 ENCOUNTER — RESULT REVIEW (OUTPATIENT)
Age: 72
End: 2022-10-11

## 2022-10-11 VITALS
WEIGHT: 149.91 LBS | RESPIRATION RATE: 18 BRPM | HEART RATE: 59 BPM | HEIGHT: 60 IN | DIASTOLIC BLOOD PRESSURE: 86 MMHG | OXYGEN SATURATION: 99 % | TEMPERATURE: 98 F | SYSTOLIC BLOOD PRESSURE: 172 MMHG

## 2022-10-11 DIAGNOSIS — Z98.890 OTHER SPECIFIED POSTPROCEDURAL STATES: Chronic | ICD-10-CM

## 2022-10-11 DIAGNOSIS — Z95.1 PRESENCE OF AORTOCORONARY BYPASS GRAFT: Chronic | ICD-10-CM

## 2022-10-11 DIAGNOSIS — Z98.61 CORONARY ANGIOPLASTY STATUS: Chronic | ICD-10-CM

## 2022-10-11 DIAGNOSIS — Z98.84 BARIATRIC SURGERY STATUS: Chronic | ICD-10-CM

## 2022-10-11 DIAGNOSIS — G99.2 MYELOPATHY IN DISEASES CLASSIFIED ELSEWHERE: ICD-10-CM

## 2022-10-11 DIAGNOSIS — Z90.49 ACQUIRED ABSENCE OF OTHER SPECIFIED PARTS OF DIGESTIVE TRACT: Chronic | ICD-10-CM

## 2022-10-11 DIAGNOSIS — Z01.818 ENCOUNTER FOR OTHER PREPROCEDURAL EXAMINATION: ICD-10-CM

## 2022-10-11 DIAGNOSIS — E89.0 POSTPROCEDURAL HYPOTHYROIDISM: Chronic | ICD-10-CM

## 2022-10-11 LAB
A1C WITH ESTIMATED AVERAGE GLUCOSE RESULT: 6.2 % — HIGH (ref 4–5.6)
ALBUMIN SERPL ELPH-MCNC: 3.5 G/DL — SIGNIFICANT CHANGE UP (ref 3.3–5)
ANION GAP SERPL CALC-SCNC: 0 MMOL/L — LOW (ref 5–17)
APPEARANCE UR: CLEAR — SIGNIFICANT CHANGE UP
APTT BLD: 29.3 SEC — SIGNIFICANT CHANGE UP (ref 27.5–35.5)
BASOPHILS # BLD AUTO: 0.02 K/UL — SIGNIFICANT CHANGE UP (ref 0–0.2)
BASOPHILS NFR BLD AUTO: 0.3 % — SIGNIFICANT CHANGE UP (ref 0–2)
BILIRUB UR-MCNC: NEGATIVE — SIGNIFICANT CHANGE UP
BUN SERPL-MCNC: 12 MG/DL — SIGNIFICANT CHANGE UP (ref 7–23)
CALCIUM SERPL-MCNC: 9.4 MG/DL — SIGNIFICANT CHANGE UP (ref 8.5–10.1)
CHLORIDE SERPL-SCNC: 109 MMOL/L — HIGH (ref 96–108)
CO2 SERPL-SCNC: 32 MMOL/L — HIGH (ref 22–31)
COLOR SPEC: YELLOW — SIGNIFICANT CHANGE UP
CREAT SERPL-MCNC: 0.82 MG/DL — SIGNIFICANT CHANGE UP (ref 0.5–1.3)
DIFF PNL FLD: NEGATIVE — SIGNIFICANT CHANGE UP
EGFR: 93 ML/MIN/1.73M2 — SIGNIFICANT CHANGE UP
EOSINOPHIL # BLD AUTO: 0.07 K/UL — SIGNIFICANT CHANGE UP (ref 0–0.5)
EOSINOPHIL NFR BLD AUTO: 1 % — SIGNIFICANT CHANGE UP (ref 0–6)
ESTIMATED AVERAGE GLUCOSE: 131 MG/DL — HIGH (ref 68–114)
GLUCOSE SERPL-MCNC: 95 MG/DL — SIGNIFICANT CHANGE UP (ref 70–99)
GLUCOSE UR QL: 1000 MG/DL
HCT VFR BLD CALC: 36.7 % — LOW (ref 39–50)
HGB BLD-MCNC: 10.9 G/DL — LOW (ref 13–17)
IMM GRANULOCYTES NFR BLD AUTO: 0.3 % — SIGNIFICANT CHANGE UP (ref 0–0.9)
INR BLD: 1.05 RATIO — SIGNIFICANT CHANGE UP (ref 0.88–1.16)
KETONES UR-MCNC: NEGATIVE — SIGNIFICANT CHANGE UP
LEUKOCYTE ESTERASE UR-ACNC: NEGATIVE — SIGNIFICANT CHANGE UP
LYMPHOCYTES # BLD AUTO: 1.26 K/UL — SIGNIFICANT CHANGE UP (ref 1–3.3)
LYMPHOCYTES # BLD AUTO: 18.6 % — SIGNIFICANT CHANGE UP (ref 13–44)
MCHC RBC-ENTMCNC: 22 PG — LOW (ref 27–34)
MCHC RBC-ENTMCNC: 29.7 GM/DL — LOW (ref 32–36)
MCV RBC AUTO: 74 FL — LOW (ref 80–100)
MONOCYTES # BLD AUTO: 0.63 K/UL — SIGNIFICANT CHANGE UP (ref 0–0.9)
MONOCYTES NFR BLD AUTO: 9.3 % — SIGNIFICANT CHANGE UP (ref 2–14)
MRSA PCR RESULT.: SIGNIFICANT CHANGE UP
NEUTROPHILS # BLD AUTO: 4.79 K/UL — SIGNIFICANT CHANGE UP (ref 1.8–7.4)
NEUTROPHILS NFR BLD AUTO: 70.5 % — SIGNIFICANT CHANGE UP (ref 43–77)
NITRITE UR-MCNC: NEGATIVE — SIGNIFICANT CHANGE UP
PH UR: 7 — SIGNIFICANT CHANGE UP (ref 5–8)
PLATELET # BLD AUTO: 167 K/UL — SIGNIFICANT CHANGE UP (ref 150–400)
POTASSIUM SERPL-MCNC: 4.8 MMOL/L — SIGNIFICANT CHANGE UP (ref 3.5–5.3)
POTASSIUM SERPL-SCNC: 4.8 MMOL/L — SIGNIFICANT CHANGE UP (ref 3.5–5.3)
PROT UR-MCNC: 30 MG/DL
PROTHROM AB SERPL-ACNC: 12.2 SEC — SIGNIFICANT CHANGE UP (ref 10.5–13.4)
RBC # BLD: 4.96 M/UL — SIGNIFICANT CHANGE UP (ref 4.2–5.8)
RBC # FLD: 20 % — HIGH (ref 10.3–14.5)
S AUREUS DNA NOSE QL NAA+PROBE: DETECTED
SODIUM SERPL-SCNC: 141 MMOL/L — SIGNIFICANT CHANGE UP (ref 135–145)
SP GR SPEC: 1.01 — SIGNIFICANT CHANGE UP (ref 1.01–1.02)
UROBILINOGEN FLD QL: 1
WBC # BLD: 6.79 K/UL — SIGNIFICANT CHANGE UP (ref 3.8–10.5)
WBC # FLD AUTO: 6.79 K/UL — SIGNIFICANT CHANGE UP (ref 3.8–10.5)

## 2022-10-11 PROCEDURE — 85730 THROMBOPLASTIN TIME PARTIAL: CPT

## 2022-10-11 PROCEDURE — 71046 X-RAY EXAM CHEST 2 VIEWS: CPT

## 2022-10-11 PROCEDURE — 85610 PROTHROMBIN TIME: CPT

## 2022-10-11 PROCEDURE — 83036 HEMOGLOBIN GLYCOSYLATED A1C: CPT

## 2022-10-11 PROCEDURE — 86901 BLOOD TYPING SEROLOGIC RH(D): CPT

## 2022-10-11 PROCEDURE — 36415 COLL VENOUS BLD VENIPUNCTURE: CPT

## 2022-10-11 PROCEDURE — 93005 ELECTROCARDIOGRAM TRACING: CPT

## 2022-10-11 PROCEDURE — 87641 MR-STAPH DNA AMP PROBE: CPT

## 2022-10-11 PROCEDURE — 93010 ELECTROCARDIOGRAM REPORT: CPT

## 2022-10-11 PROCEDURE — 85025 COMPLETE CBC W/AUTO DIFF WBC: CPT

## 2022-10-11 PROCEDURE — 82040 ASSAY OF SERUM ALBUMIN: CPT

## 2022-10-11 PROCEDURE — 80048 BASIC METABOLIC PNL TOTAL CA: CPT

## 2022-10-11 PROCEDURE — 99214 OFFICE O/P EST MOD 30 MIN: CPT | Mod: 25

## 2022-10-11 PROCEDURE — 71046 X-RAY EXAM CHEST 2 VIEWS: CPT | Mod: 26

## 2022-10-11 PROCEDURE — 86900 BLOOD TYPING SEROLOGIC ABO: CPT

## 2022-10-11 PROCEDURE — 81001 URINALYSIS AUTO W/SCOPE: CPT

## 2022-10-11 PROCEDURE — 86850 RBC ANTIBODY SCREEN: CPT

## 2022-10-11 PROCEDURE — 87640 STAPH A DNA AMP PROBE: CPT

## 2022-10-11 RX ORDER — SUCRALFATE 1 G
1 TABLET ORAL
Qty: 0 | Refills: 0 | DISCHARGE

## 2022-10-11 RX ORDER — MAGNESIUM CHLORIDE
0 CRYSTALS MISCELLANEOUS
Qty: 0 | Refills: 0 | DISCHARGE

## 2022-10-11 RX ORDER — ZOLPIDEM TARTRATE 10 MG/1
0 TABLET ORAL
Qty: 0 | Refills: 0 | DISCHARGE

## 2022-10-11 RX ORDER — ESCITALOPRAM OXALATE 10 MG/1
1 TABLET, FILM COATED ORAL
Qty: 0 | Refills: 0 | DISCHARGE

## 2022-10-11 RX ORDER — METFORMIN HYDROCHLORIDE 850 MG/1
2 TABLET ORAL
Qty: 0 | Refills: 0 | DISCHARGE

## 2022-10-11 RX ORDER — IMIQUIMOD 50 MG/G
1 CREAM TOPICAL
Qty: 0 | Refills: 0 | DISCHARGE

## 2022-10-11 RX ORDER — ALFUZOSIN HYDROCHLORIDE 10 MG/1
1 TABLET, EXTENDED RELEASE ORAL
Qty: 0 | Refills: 0 | DISCHARGE

## 2022-10-11 RX ORDER — FAMOTIDINE 10 MG/ML
1 INJECTION INTRAVENOUS
Qty: 0 | Refills: 0 | DISCHARGE

## 2022-10-11 RX ORDER — EMPAGLIFLOZIN 10 MG/1
1 TABLET, FILM COATED ORAL
Qty: 0 | Refills: 0 | DISCHARGE

## 2022-10-11 RX ORDER — MILK THISTLE 150 MG
0 CAPSULE ORAL
Qty: 0 | Refills: 0 | DISCHARGE

## 2022-10-11 RX ORDER — PREGABALIN 225 MG/1
5000 CAPSULE ORAL
Qty: 0 | Refills: 0 | DISCHARGE

## 2022-10-11 RX ORDER — CHOLECALCIFEROL (VITAMIN D3) 125 MCG
1 CAPSULE ORAL
Qty: 0 | Refills: 0 | DISCHARGE

## 2022-10-11 NOTE — H&P PST ADULT - NSICDXPASTMEDICALHX_GEN_ALL_CORE_FT
PAST MEDICAL HISTORY:  AI (aortic insufficiency)     Anxiety and depression     ASHD (arteriosclerotic heart disease)     Bell's palsy     Bile duct stone removed, sphincter of Oddi dysfunction    BPH (benign prostatic hyperplasia)     CAD (coronary artery disease)     Depression, major     Erectile dysfunction     H/O cholecystitis had surgery    HLD (hyperlipidemia)     HTN (hypertension)     Hyperparathyroidism     Hypothyroidism     Myocardial infarction 1983, 04/2022 S/P CABG    OA (osteoarthritis)     OA (osteoarthritis) of knee     Other specified sepsis 2014 gram negative sepsis    Proteinuria     S/P colonoscopy lymphoid polyp 2009    Skin cancer of head - removed - non melanoma    Spinal stenosis cervical    Thyroid cancer 2018    Type 2 diabetes mellitus

## 2022-10-11 NOTE — H&P PST ADULT - NSANTHOSAYNRD_GEN_A_CORE
No. NIKOLAS screening performed.  STOP BANG Legend: 0-2 = LOW Risk; 3-4 = INTERMEDIATE Risk; 5-8 = HIGH Risk

## 2022-10-11 NOTE — H&P PST ADULT - NSICDXPASTSURGICALHX_GEN_ALL_CORE_FT
PAST SURGICAL HISTORY:  H/O thyroidectomy     S/P CABG x 3 04/2022 at [facility]    S/P cholecystectomy     S/P colonoscopy     S/P gastric bypass complicated by PUD 2010    S/P hernia repair     S/P PTCA (percutaneous transluminal coronary angioplasty) 08/2001, 03/2001, 08/2000, 07/2000, 06/2000 total of 8 stents prior to CABG in 04/2022

## 2022-10-11 NOTE — H&P PST ADULT - HISTORY OF PRESENT ILLNESS
71 y/o male with stenosis of cervical spine with myelopathy, PMHx of CAD s/p multiple stents and balloon angioplasties, MI (1983), DM II,  HLD, HTN, thyroid cancer s/p thyroidectomy, cholecystitis s/p sepsis requiring cholecystectomy, PUD s/p gastric bypass, spinal stenosis, depression was transferred from [place] for a CABG evaluation.  He is here for PST for planned  71 y/o male with stenosis of cervical spine with myelopathy, PMHx of CAD, S/P CABG x 3 vessels on 04/2022, s/p multiple stents and balloon angioplasties, MI (1983, 04/2022 ), DM II,  HLD, HTN, thyroid cancer s/p thyroidectomy, cholecystitis s/p sepsis requiring cholecystectomy, PUD, s/p gastric bypass, spinal stenosis, depression. Pt complain of numbness to left arm and left hand, he denies neck pain. He is here for PST for planned C3-7 laminoplasty.

## 2022-10-11 NOTE — H&P PST ADULT - ASSESSMENT
71 y/o male with stenosis of cervical spine with myelopathy, PMHx of CAD, S/P CABG x 3 vessels on 2022, s/p multiple stents and balloon angioplasties, MI (, 2022 ), DM II,  HLD, HTN, thyroid cancer s/p thyroidectomy, cholecystitis s/p sepsis requiring cholecystectomy, PUD, s/p gastric bypass, spinal stenosis, depression. Pt complain of numbness to left arm and left hand, he denies neck pain. He is scheduled for C3-7 laminoplasty.   Plan  1. Stop all NSAIDS, herbal supplements and vitamins for 7 days.  2. NPO as per ASU instructions  3. Take the following medications ( propranolol, Pepcid, Lexapro, Synthroid) ) with small sips of water on the morning of your procedure/surgery.  4. Use EZ sponges as directed  5. Use mupirocin as directed  6. Labs, EKG, CXR as per surgeon. COVID test on 10/16/2022, pt instucted.  7. Cardiologist and PMD visit for optimization prior to surgery as per surgeon.  8. Pt instructed to follow preop Aspirin instructions from cardiologist and preop instructions on Victoza and Lantus from PMD.   9. Pt instructed to hold Jardiance 3-4 days before surgery as per DM preop guidelines.     CAPRINI SCORE [CLOT]    AGE RELATED RISK FACTORS                                                       MOBILITY RELATED FACTORS  [ ] Age 41-60 years                                            (1 Point)                  [ ] Bed rest                                                        (1 Point)  [x ] Age: 61-74 years                                           (2 Points)                 [ ] Plaster cast                                                   (2 Points)  [ ] Age= 75 years                                              (3 Points)                 [ ] Bed bound for more than 72 hours                 (2 Points)    DISEASE RELATED RISK FACTORS                                               GENDER SPECIFIC FACTORS  [ ] Edema in the lower extremities                       (1 Point)                  [ ] Pregnancy                                                     (1 Point)  [ ] Varicose veins                                               (1 Point)                  [ ] Post-partum < 6 weeks                                   (1 Point)             [x ] BMI > 25 Kg/m2                                            (1 Point)                  [ ] Hormonal therapy  or oral contraception          (1 Point)                 [ ] Sepsis (in the previous month)                        (1 Point)                  [ ] History of pregnancy complications                 (1 point)  [ ] Pneumonia or serious lung disease                                               [ ] Unexplained or recurrent                     (1 Point)           (in the previous month)                               (1 Point)  [ ] Abnormal pulmonary function test                     (1 Point)                 SURGERY RELATED RISK FACTORS  [ ] Acute myocardial infarction                              (1 Point)                 [ ]  Section                                             (1 Point)  [ ] Congestive heart failure (in the previous month)  (1 Point)               [ ] Minor surgery                                                  (1 Point)   [ ] Inflammatory bowel disease                             (1 Point)                 [ ] Arthroscopic surgery                                        (2 Points)  [ ] Central venous access                                      (2 Points)                [ x ] General surgery lasting more than 45 minutes   (2 Points)       [ ] Stroke (in the previous month)                          (5 Points)               [ ] Elective arthroplasty                                         (5 Points)     ()  malignancy                                                             (2 points )                                                                                                                                      HEMATOLOGY RELATED FACTORS                                                 TRAUMA RELATED RISK FACTORS  [ ] Prior episodes of VTE                                     (3 Points)                 [ ] Fracture of the hip, pelvis, or leg                       (5 Points)  [ ] Positive family history for VTE                         (3 Points)                 [ ] Acute spinal cord injury (in the previous month)  (5 Points)  [ ] Prothrombin 71959 A                                     (3 Points)                 [ ] Paralysis  (less than 1 month)                             (5 Points)  [ ] Factor V Leiden                                             (3 Points)                  [ ] Multiple Trauma within 1 month                        (5 Points)  [ ] Lupus anticoagulants                                     (3 Points)                                                           [ ] Anticardiolipin antibodies                               (3 Points)                                                       [ ] High homocysteine in the blood                      (3 Points)                                             [ ] Other congenital or acquired thrombophilia      (3 Points)                                                [ ] Heparin induced thrombocytopenia                  (3 Points)    (  ) Malignancy                                        Total Score [        5  ]  The Caprini score indicates this patient is at risk for a VTE event (score 3-5).  Most surgical patients in this group would benefit from pharmacologic prophylaxis.  The surgical team will determine the balance between VTE risk and bleeding risk

## 2022-10-12 DIAGNOSIS — G99.2 MYELOPATHY IN DISEASES CLASSIFIED ELSEWHERE: ICD-10-CM

## 2022-10-12 DIAGNOSIS — Z01.818 ENCOUNTER FOR OTHER PREPROCEDURAL EXAMINATION: ICD-10-CM

## 2022-10-13 ENCOUNTER — NON-APPOINTMENT (OUTPATIENT)
Age: 72
End: 2022-10-13

## 2022-10-13 NOTE — CHART NOTE - NSCHARTNOTEFT_GEN_A_CORE
MSSA detected from PCR nasal swab done in [facility] 10/11/2022. Instructed to apply Mupirocin to nostrils 2 times per day for 5 days starting 10/14/2022.

## 2022-10-16 ENCOUNTER — NON-APPOINTMENT (OUTPATIENT)
Age: 72
End: 2022-10-16

## 2022-10-18 RX ORDER — SODIUM CHLORIDE 9 MG/ML
1000 INJECTION, SOLUTION INTRAVENOUS
Refills: 0 | Status: DISCONTINUED | OUTPATIENT
Start: 2022-10-19 | End: 2022-10-19

## 2022-10-18 RX ORDER — ONDANSETRON 8 MG/1
4 TABLET, FILM COATED ORAL ONCE
Refills: 0 | Status: DISCONTINUED | OUTPATIENT
Start: 2022-10-19 | End: 2022-10-19

## 2022-10-18 RX ORDER — FENTANYL CITRATE 50 UG/ML
50 INJECTION INTRAVENOUS
Refills: 0 | Status: DISCONTINUED | OUTPATIENT
Start: 2022-10-19 | End: 2022-10-19

## 2022-10-19 ENCOUNTER — INPATIENT (INPATIENT)
Facility: HOSPITAL | Age: 72
LOS: 5 days | Discharge: HOME | DRG: 519 | End: 2022-10-25
Attending: NEUROLOGICAL SURGERY | Admitting: NEUROLOGICAL SURGERY
Payer: MEDICARE

## 2022-10-19 ENCOUNTER — TRANSCRIPTION ENCOUNTER (OUTPATIENT)
Age: 72
End: 2022-10-19

## 2022-10-19 ENCOUNTER — APPOINTMENT (OUTPATIENT)
Dept: NEUROSURGERY | Facility: HOSPITAL | Age: 72
End: 2022-10-19

## 2022-10-19 VITALS
RESPIRATION RATE: 16 BRPM | HEART RATE: 61 BPM | TEMPERATURE: 98 F | WEIGHT: 149.91 LBS | OXYGEN SATURATION: 100 % | HEIGHT: 65 IN | SYSTOLIC BLOOD PRESSURE: 160 MMHG | DIASTOLIC BLOOD PRESSURE: 73 MMHG

## 2022-10-19 DIAGNOSIS — Z98.61 CORONARY ANGIOPLASTY STATUS: Chronic | ICD-10-CM

## 2022-10-19 DIAGNOSIS — E89.0 POSTPROCEDURAL HYPOTHYROIDISM: Chronic | ICD-10-CM

## 2022-10-19 DIAGNOSIS — Z90.49 ACQUIRED ABSENCE OF OTHER SPECIFIED PARTS OF DIGESTIVE TRACT: Chronic | ICD-10-CM

## 2022-10-19 DIAGNOSIS — Z98.84 BARIATRIC SURGERY STATUS: Chronic | ICD-10-CM

## 2022-10-19 DIAGNOSIS — G99.2 MYELOPATHY IN DISEASES CLASSIFIED ELSEWHERE: ICD-10-CM

## 2022-10-19 DIAGNOSIS — Z95.1 PRESENCE OF AORTOCORONARY BYPASS GRAFT: Chronic | ICD-10-CM

## 2022-10-19 DIAGNOSIS — Z98.890 OTHER SPECIFIED POSTPROCEDURAL STATES: Chronic | ICD-10-CM

## 2022-10-19 DIAGNOSIS — M48.22: ICD-10-CM

## 2022-10-19 LAB
ANION GAP SERPL CALC-SCNC: 3 MMOL/L — LOW (ref 5–17)
BUN SERPL-MCNC: 12 MG/DL — SIGNIFICANT CHANGE UP (ref 7–23)
CALCIUM SERPL-MCNC: 8.8 MG/DL — SIGNIFICANT CHANGE UP (ref 8.5–10.1)
CHLORIDE SERPL-SCNC: 108 MMOL/L — SIGNIFICANT CHANGE UP (ref 96–108)
CO2 SERPL-SCNC: 29 MMOL/L — SIGNIFICANT CHANGE UP (ref 22–31)
CREAT SERPL-MCNC: 0.71 MG/DL — SIGNIFICANT CHANGE UP (ref 0.5–1.3)
EGFR: 97 ML/MIN/1.73M2 — SIGNIFICANT CHANGE UP
GLUCOSE SERPL-MCNC: 161 MG/DL — HIGH (ref 70–99)
POTASSIUM SERPL-MCNC: 4.4 MMOL/L — SIGNIFICANT CHANGE UP (ref 3.5–5.3)
POTASSIUM SERPL-SCNC: 4.4 MMOL/L — SIGNIFICANT CHANGE UP (ref 3.5–5.3)
SODIUM SERPL-SCNC: 140 MMOL/L — SIGNIFICANT CHANGE UP (ref 135–145)

## 2022-10-19 PROCEDURE — 80053 COMPREHEN METABOLIC PANEL: CPT

## 2022-10-19 PROCEDURE — 84100 ASSAY OF PHOSPHORUS: CPT

## 2022-10-19 PROCEDURE — 63051 C-LAMINOPLASTY W/GRAFT/PLATE: CPT

## 2022-10-19 PROCEDURE — 80048 BASIC METABOLIC PNL TOTAL CA: CPT

## 2022-10-19 PROCEDURE — 97116 GAIT TRAINING THERAPY: CPT | Mod: GP

## 2022-10-19 PROCEDURE — 85027 COMPLETE CBC AUTOMATED: CPT

## 2022-10-19 PROCEDURE — 85025 COMPLETE CBC W/AUTO DIFF WBC: CPT

## 2022-10-19 PROCEDURE — 72125 CT NECK SPINE W/O DYE: CPT

## 2022-10-19 PROCEDURE — C1713: CPT

## 2022-10-19 PROCEDURE — 82248 BILIRUBIN DIRECT: CPT

## 2022-10-19 PROCEDURE — 82962 GLUCOSE BLOOD TEST: CPT

## 2022-10-19 PROCEDURE — 36415 COLL VENOUS BLD VENIPUNCTURE: CPT

## 2022-10-19 PROCEDURE — 83735 ASSAY OF MAGNESIUM: CPT

## 2022-10-19 PROCEDURE — 97530 THERAPEUTIC ACTIVITIES: CPT | Mod: GP

## 2022-10-19 PROCEDURE — 97162 PT EVAL MOD COMPLEX 30 MIN: CPT | Mod: GP

## 2022-10-19 PROCEDURE — 76000 FLUOROSCOPY <1 HR PHYS/QHP: CPT

## 2022-10-19 PROCEDURE — C9399: CPT

## 2022-10-19 PROCEDURE — C1889: CPT

## 2022-10-19 PROCEDURE — 93005 ELECTROCARDIOGRAM TRACING: CPT

## 2022-10-19 PROCEDURE — 84484 ASSAY OF TROPONIN QUANT: CPT

## 2022-10-19 PROCEDURE — 63051 C-LAMINOPLASTY W/GRAFT/PLATE: CPT | Mod: AS

## 2022-10-19 RX ORDER — LOSARTAN/HYDROCHLOROTHIAZIDE 100MG-25MG
1 TABLET ORAL
Qty: 0 | Refills: 0 | DISCHARGE

## 2022-10-19 RX ORDER — SENNA PLUS 8.6 MG/1
2 TABLET ORAL AT BEDTIME
Refills: 0 | Status: DISCONTINUED | OUTPATIENT
Start: 2022-10-19 | End: 2022-10-25

## 2022-10-19 RX ORDER — FAMOTIDINE 10 MG/ML
1 INJECTION INTRAVENOUS
Qty: 0 | Refills: 0 | DISCHARGE

## 2022-10-19 RX ORDER — TRAZODONE HCL 50 MG
100 TABLET ORAL AT BEDTIME
Refills: 0 | Status: DISCONTINUED | OUTPATIENT
Start: 2022-10-19 | End: 2022-10-20

## 2022-10-19 RX ORDER — INSULIN LISPRO 100/ML
VIAL (ML) SUBCUTANEOUS AT BEDTIME
Refills: 0 | Status: DISCONTINUED | OUTPATIENT
Start: 2022-10-19 | End: 2022-10-25

## 2022-10-19 RX ORDER — FOLIC ACID 0.8 MG
1 TABLET ORAL DAILY
Refills: 0 | Status: DISCONTINUED | OUTPATIENT
Start: 2022-10-19 | End: 2022-10-25

## 2022-10-19 RX ORDER — LIRAGLUTIDE 6 MG/ML
1 INJECTION SUBCUTANEOUS
Qty: 0 | Refills: 0 | DISCHARGE

## 2022-10-19 RX ORDER — CYCLOBENZAPRINE HYDROCHLORIDE 10 MG/1
10 TABLET, FILM COATED ORAL EVERY 8 HOURS
Refills: 0 | Status: DISCONTINUED | OUTPATIENT
Start: 2022-10-19 | End: 2022-10-20

## 2022-10-19 RX ORDER — ONDANSETRON 8 MG/1
4 TABLET, FILM COATED ORAL EVERY 6 HOURS
Refills: 0 | Status: DISCONTINUED | OUTPATIENT
Start: 2022-10-19 | End: 2022-10-25

## 2022-10-19 RX ORDER — TRANEXAMIC ACID 100 MG/ML
1000 INJECTION, SOLUTION INTRAVENOUS ONCE
Refills: 0 | Status: DISCONTINUED | OUTPATIENT
Start: 2022-10-19 | End: 2022-10-20

## 2022-10-19 RX ORDER — ACETAMINOPHEN 500 MG
650 TABLET ORAL EVERY 6 HOURS
Refills: 0 | Status: DISCONTINUED | OUTPATIENT
Start: 2022-10-19 | End: 2022-10-20

## 2022-10-19 RX ORDER — LEVOTHYROXINE SODIUM 125 MCG
1 TABLET ORAL
Qty: 0 | Refills: 0 | DISCHARGE

## 2022-10-19 RX ORDER — FAMOTIDINE 10 MG/ML
20 INJECTION INTRAVENOUS EVERY 12 HOURS
Refills: 0 | Status: DISCONTINUED | OUTPATIENT
Start: 2022-10-19 | End: 2022-10-25

## 2022-10-19 RX ORDER — HYDROMORPHONE HYDROCHLORIDE 2 MG/ML
0.5 INJECTION INTRAMUSCULAR; INTRAVENOUS; SUBCUTANEOUS ONCE
Refills: 0 | Status: DISCONTINUED | OUTPATIENT
Start: 2022-10-19 | End: 2022-10-19

## 2022-10-19 RX ORDER — OXYCODONE HYDROCHLORIDE 5 MG/1
5 TABLET ORAL EVERY 6 HOURS
Refills: 0 | Status: DISCONTINUED | OUTPATIENT
Start: 2022-10-19 | End: 2022-10-20

## 2022-10-19 RX ORDER — FINASTERIDE 5 MG/1
5 TABLET, FILM COATED ORAL DAILY
Refills: 0 | Status: DISCONTINUED | OUTPATIENT
Start: 2022-10-19 | End: 2022-10-25

## 2022-10-19 RX ORDER — DEXTROSE 50 % IN WATER 50 %
15 SYRINGE (ML) INTRAVENOUS ONCE
Refills: 0 | Status: DISCONTINUED | OUTPATIENT
Start: 2022-10-19 | End: 2022-10-25

## 2022-10-19 RX ORDER — TAMSULOSIN HYDROCHLORIDE 0.4 MG/1
0.4 CAPSULE ORAL AT BEDTIME
Refills: 0 | Status: DISCONTINUED | OUTPATIENT
Start: 2022-10-19 | End: 2022-10-25

## 2022-10-19 RX ORDER — BENZOCAINE AND MENTHOL 5; 1 G/100ML; G/100ML
1 LIQUID ORAL
Refills: 0 | Status: DISCONTINUED | OUTPATIENT
Start: 2022-10-19 | End: 2022-10-25

## 2022-10-19 RX ORDER — ROSUVASTATIN CALCIUM 5 MG/1
1 TABLET ORAL
Qty: 0 | Refills: 0 | DISCHARGE

## 2022-10-19 RX ORDER — METFORMIN HYDROCHLORIDE 850 MG/1
1 TABLET ORAL
Qty: 0 | Refills: 0 | DISCHARGE

## 2022-10-19 RX ORDER — SODIUM CHLORIDE 9 MG/ML
1000 INJECTION INTRAMUSCULAR; INTRAVENOUS; SUBCUTANEOUS
Refills: 0 | Status: DISCONTINUED | OUTPATIENT
Start: 2022-10-19 | End: 2022-10-20

## 2022-10-19 RX ORDER — ESCITALOPRAM OXALATE 10 MG/1
20 TABLET, FILM COATED ORAL DAILY
Refills: 0 | Status: DISCONTINUED | OUTPATIENT
Start: 2022-10-19 | End: 2022-10-25

## 2022-10-19 RX ORDER — FINASTERIDE 5 MG/1
1 TABLET, FILM COATED ORAL
Qty: 0 | Refills: 0 | DISCHARGE

## 2022-10-19 RX ORDER — INSULIN GLARGINE 100 [IU]/ML
15 INJECTION, SOLUTION SUBCUTANEOUS AT BEDTIME
Refills: 0 | Status: DISCONTINUED | OUTPATIENT
Start: 2022-10-19 | End: 2022-10-19

## 2022-10-19 RX ORDER — ASPIRIN/CALCIUM CARB/MAGNESIUM 324 MG
1 TABLET ORAL
Qty: 0 | Refills: 0 | DISCHARGE

## 2022-10-19 RX ORDER — PROPRANOLOL HCL 160 MG
1 CAPSULE, EXTENDED RELEASE 24HR ORAL
Qty: 0 | Refills: 0 | DISCHARGE

## 2022-10-19 RX ORDER — OXYCODONE HYDROCHLORIDE 5 MG/1
5 TABLET ORAL ONCE
Refills: 0 | Status: DISCONTINUED | OUTPATIENT
Start: 2022-10-19 | End: 2022-10-19

## 2022-10-19 RX ORDER — HYDRALAZINE HCL 50 MG
10 TABLET ORAL ONCE
Refills: 0 | Status: COMPLETED | OUTPATIENT
Start: 2022-10-19 | End: 2022-10-19

## 2022-10-19 RX ORDER — LEVOTHYROXINE SODIUM 125 MCG
137 TABLET ORAL DAILY
Refills: 0 | Status: DISCONTINUED | OUTPATIENT
Start: 2022-10-19 | End: 2022-10-25

## 2022-10-19 RX ORDER — POLYETHYLENE GLYCOL 3350 17 G/17G
17 POWDER, FOR SOLUTION ORAL DAILY
Refills: 0 | Status: DISCONTINUED | OUTPATIENT
Start: 2022-10-19 | End: 2022-10-22

## 2022-10-19 RX ORDER — INSULIN LISPRO 100/ML
VIAL (ML) SUBCUTANEOUS
Refills: 0 | Status: DISCONTINUED | OUTPATIENT
Start: 2022-10-19 | End: 2022-10-25

## 2022-10-19 RX ORDER — EMPAGLIFLOZIN 10 MG/1
1 TABLET, FILM COATED ORAL
Qty: 0 | Refills: 0 | DISCHARGE

## 2022-10-19 RX ORDER — CEFAZOLIN SODIUM 1 G
2000 VIAL (EA) INJECTION EVERY 8 HOURS
Refills: 0 | Status: COMPLETED | OUTPATIENT
Start: 2022-10-19 | End: 2022-10-19

## 2022-10-19 RX ORDER — INSULIN LISPRO 100/ML
VIAL (ML) SUBCUTANEOUS
Refills: 0 | Status: DISCONTINUED | OUTPATIENT
Start: 2022-10-19 | End: 2022-10-19

## 2022-10-19 RX ORDER — ESCITALOPRAM OXALATE 10 MG/1
1 TABLET, FILM COATED ORAL
Qty: 0 | Refills: 0 | DISCHARGE

## 2022-10-19 RX ORDER — GLUCAGON INJECTION, SOLUTION 0.5 MG/.1ML
1 INJECTION, SOLUTION SUBCUTANEOUS ONCE
Refills: 0 | Status: DISCONTINUED | OUTPATIENT
Start: 2022-10-19 | End: 2022-10-25

## 2022-10-19 RX ORDER — ATORVASTATIN CALCIUM 80 MG/1
40 TABLET, FILM COATED ORAL AT BEDTIME
Refills: 0 | Status: DISCONTINUED | OUTPATIENT
Start: 2022-10-19 | End: 2022-10-25

## 2022-10-19 RX ORDER — HEPARIN SODIUM 5000 [USP'U]/ML
5000 INJECTION INTRAVENOUS; SUBCUTANEOUS EVERY 12 HOURS
Refills: 0 | Status: DISCONTINUED | OUTPATIENT
Start: 2022-10-20 | End: 2022-10-25

## 2022-10-19 RX ORDER — INSULIN GLARGINE 100 [IU]/ML
15 INJECTION, SOLUTION SUBCUTANEOUS AT BEDTIME
Refills: 0 | Status: DISCONTINUED | OUTPATIENT
Start: 2022-10-19 | End: 2022-10-25

## 2022-10-19 RX ADMIN — FENTANYL CITRATE 50 MICROGRAM(S): 50 INJECTION INTRAVENOUS at 13:41

## 2022-10-19 RX ADMIN — SENNA PLUS 2 TABLET(S): 8.6 TABLET ORAL at 21:16

## 2022-10-19 RX ADMIN — OXYCODONE HYDROCHLORIDE 5 MILLIGRAM(S): 5 TABLET ORAL at 19:05

## 2022-10-19 RX ADMIN — OXYCODONE HYDROCHLORIDE 5 MILLIGRAM(S): 5 TABLET ORAL at 14:03

## 2022-10-19 RX ADMIN — Medication 100 MILLIGRAM(S): at 22:29

## 2022-10-19 RX ADMIN — HYDROMORPHONE HYDROCHLORIDE 0.5 MILLIGRAM(S): 2 INJECTION INTRAMUSCULAR; INTRAVENOUS; SUBCUTANEOUS at 14:00

## 2022-10-19 RX ADMIN — OXYCODONE HYDROCHLORIDE 5 MILLIGRAM(S): 5 TABLET ORAL at 19:54

## 2022-10-19 RX ADMIN — INSULIN GLARGINE 15 UNIT(S): 100 INJECTION, SOLUTION SUBCUTANEOUS at 21:40

## 2022-10-19 RX ADMIN — OXYCODONE HYDROCHLORIDE 5 MILLIGRAM(S): 5 TABLET ORAL at 15:00

## 2022-10-19 RX ADMIN — ATORVASTATIN CALCIUM 40 MILLIGRAM(S): 80 TABLET, FILM COATED ORAL at 21:17

## 2022-10-19 RX ADMIN — OXYCODONE HYDROCHLORIDE 5 MILLIGRAM(S): 5 TABLET ORAL at 13:42

## 2022-10-19 RX ADMIN — Medication 100 MILLIGRAM(S): at 14:28

## 2022-10-19 RX ADMIN — CYCLOBENZAPRINE HYDROCHLORIDE 10 MILLIGRAM(S): 10 TABLET, FILM COATED ORAL at 21:18

## 2022-10-19 RX ADMIN — SODIUM CHLORIDE 125 MILLILITER(S): 9 INJECTION, SOLUTION INTRAVENOUS at 12:32

## 2022-10-19 RX ADMIN — Medication 10 MILLIGRAM(S): at 12:30

## 2022-10-19 RX ADMIN — CYCLOBENZAPRINE HYDROCHLORIDE 10 MILLIGRAM(S): 10 TABLET, FILM COATED ORAL at 15:24

## 2022-10-19 NOTE — BRIEF OPERATIVE NOTE - NSICDXBRIEFPROCEDURE_GEN_ALL_CORE_FT
PROCEDURES:  Laminoplasty, spine, cervical, 3 levels, posterior approach 19-Oct-2022 17:19:28  Chao Sorensen

## 2022-10-19 NOTE — PATIENT PROFILE ADULT - NSPROHMDIABETMGMTSTRAT_GEN_A_NUR
blood glucose testing/diet modification/insulin therapy/medication therapy/routine screenings/weight management

## 2022-10-19 NOTE — PATIENT PROFILE ADULT - FALL HARM RISK - UNIVERSAL INTERVENTIONS
Bed in lowest position, wheels locked, appropriate side rails in place/Call bell, personal items and telephone in reach/Instruct patient to call for assistance before getting out of bed or chair/Non-slip footwear when patient is out of bed/[place] to call system/Physically safe environment - no spills, clutter or unnecessary equipment/Purposeful Proactive Rounding/Room/bathroom lighting operational, light cord in reach

## 2022-10-19 NOTE — CHART NOTE - NSCHARTNOTEFT_GEN_A_CORE
Called by RN for pt who was due to void and continues to have difficulty. Per RN bladder scan shows about 700 cc of urine, order placed for straight cath, notified by RN that pt is refusing the straight cath.   Pt was seen and states he has had urinary retention in the past. Pt advised of the importance of the straight cath to empty pt's bladder. Pt advised he is at risk for hydronephrosis, renal failure, bladder rupture, internal bleeding, infection and death.   Pt oriented x3 with clear speech, pt states he is fully aware of the possible risks and he continues to refuse the straight cath stating " I don't want it".   Pt asked if I can speak to a family member and he stated "No do not call anyone"  d/w pt's RN, admin and Dr. Keller   d/w RN will add Flomax, if pt still unable to void will continue to encourage placement of straight cath Called by RN for pt who was due to void and continues to have difficulty. Per RN bladder scan shows about 700 cc of urine, order placed for straight cath, notified by RN that pt is refusing the straight cath.   Pt was seen and examined pt appears comfortable, MARIANO x4 antigravity, strong thru out. Pt states he has had urinary retention in the past. Pt advised of the importance of the straight cath to empty pt's bladder. Pt advised he is at risk for hydronephrosis, renal failure, bladder rupture, internal bleeding, infection and death.   Pt oriented x3 with clear speech, pt states he is fully aware of the possible risks and he continues to refuse the straight cath stating " I don't want it".   Pt asked if I can speak to a family member and he stated "No do not call anyone"  d/w pt's RN, nursing admin and Dr. Keller   d/w RN will add Flomax, if pt still unable to void will continue to encourage placement of straight cath, pt encouraged to get oob and ambulate   pt was given oxycodone and will add IV tylenol   Pt's SBP noted to be 185/62      Pt re-evaluated at with improvement of SBP down to 160   and able to void with post-void bladder scan showing 150cc     Notified by RN around 5:00 AM with pt c/o pain and BP noted to be 193/79  pt given oxycodone and Hydralazine   pt continued to MARIANO with good strength   and pt voiding well   pt denies any CP or SOB     Dilaudid SQ added to pain regimen and pt given his AM dose of propanolol    will cont IV tylenol, oxycodone 5mg q4 hrs prn mild pain, oxycodone 10mg q4 hrs prn moderate pain, and SQ Dilaudid for  breakthru pain    d/w Dr. Keller if pt continues to be hypertensive will call cardiology consult   pt denies any CP, SOB   pt c/o pain to posterior neck incision   pt MARIANO  strong thru out   states pre-op numbness and tingling in LUE is unchanged     All above was d/w Dr. Keller who agrees with the plan and management   will continue to monitor pt closely today and adjust pain medication and antihypertensive meds accordingly

## 2022-10-20 DIAGNOSIS — I10 ESSENTIAL (PRIMARY) HYPERTENSION: ICD-10-CM

## 2022-10-20 DIAGNOSIS — I25.10 ATHEROSCLEROTIC HEART DISEASE OF NATIVE CORONARY ARTERY WITHOUT ANGINA PECTORIS: ICD-10-CM

## 2022-10-20 LAB
ALBUMIN SERPL ELPH-MCNC: 3.3 G/DL — SIGNIFICANT CHANGE UP (ref 3.3–5)
ALP SERPL-CCNC: 51 U/L — SIGNIFICANT CHANGE UP (ref 40–120)
ALT FLD-CCNC: 19 U/L — SIGNIFICANT CHANGE UP (ref 12–78)
ANION GAP SERPL CALC-SCNC: 5 MMOL/L — SIGNIFICANT CHANGE UP (ref 5–17)
AST SERPL-CCNC: 20 U/L — SIGNIFICANT CHANGE UP (ref 15–37)
BASOPHILS # BLD AUTO: 0.02 K/UL — SIGNIFICANT CHANGE UP (ref 0–0.2)
BASOPHILS NFR BLD AUTO: 0.1 % — SIGNIFICANT CHANGE UP (ref 0–2)
BILIRUB SERPL-MCNC: 0.5 MG/DL — SIGNIFICANT CHANGE UP (ref 0.2–1.2)
BUN SERPL-MCNC: 13 MG/DL — SIGNIFICANT CHANGE UP (ref 7–23)
CALCIUM SERPL-MCNC: 9.4 MG/DL — SIGNIFICANT CHANGE UP (ref 8.5–10.1)
CHLORIDE SERPL-SCNC: 104 MMOL/L — SIGNIFICANT CHANGE UP (ref 96–108)
CO2 SERPL-SCNC: 29 MMOL/L — SIGNIFICANT CHANGE UP (ref 22–31)
CREAT SERPL-MCNC: 0.7 MG/DL — SIGNIFICANT CHANGE UP (ref 0.5–1.3)
EGFR: 98 ML/MIN/1.73M2 — SIGNIFICANT CHANGE UP
EOSINOPHIL # BLD AUTO: 0 K/UL — SIGNIFICANT CHANGE UP (ref 0–0.5)
EOSINOPHIL NFR BLD AUTO: 0 % — SIGNIFICANT CHANGE UP (ref 0–6)
GLUCOSE SERPL-MCNC: 146 MG/DL — HIGH (ref 70–99)
HCT VFR BLD CALC: 30.9 % — LOW (ref 39–50)
HGB BLD-MCNC: 9.7 G/DL — LOW (ref 13–17)
IMM GRANULOCYTES NFR BLD AUTO: 0.5 % — SIGNIFICANT CHANGE UP (ref 0–0.9)
LYMPHOCYTES # BLD AUTO: 0.93 K/UL — LOW (ref 1–3.3)
LYMPHOCYTES # BLD AUTO: 5.3 % — LOW (ref 13–44)
MAGNESIUM SERPL-MCNC: 2.1 MG/DL — SIGNIFICANT CHANGE UP (ref 1.6–2.6)
MCHC RBC-ENTMCNC: 22.6 PG — LOW (ref 27–34)
MCHC RBC-ENTMCNC: 31.4 GM/DL — LOW (ref 32–36)
MCV RBC AUTO: 72 FL — LOW (ref 80–100)
MONOCYTES # BLD AUTO: 1.61 K/UL — HIGH (ref 0–0.9)
MONOCYTES NFR BLD AUTO: 9.2 % — SIGNIFICANT CHANGE UP (ref 2–14)
NEUTROPHILS # BLD AUTO: 14.79 K/UL — HIGH (ref 1.8–7.4)
NEUTROPHILS NFR BLD AUTO: 84.9 % — HIGH (ref 43–77)
PHOSPHATE SERPL-MCNC: 2 MG/DL — LOW (ref 2.5–4.5)
PLATELET # BLD AUTO: 180 K/UL — SIGNIFICANT CHANGE UP (ref 150–400)
POTASSIUM SERPL-MCNC: 4.1 MMOL/L — SIGNIFICANT CHANGE UP (ref 3.5–5.3)
POTASSIUM SERPL-SCNC: 4.1 MMOL/L — SIGNIFICANT CHANGE UP (ref 3.5–5.3)
PROT SERPL-MCNC: 6.6 GM/DL — SIGNIFICANT CHANGE UP (ref 6–8.3)
RBC # BLD: 4.29 M/UL — SIGNIFICANT CHANGE UP (ref 4.2–5.8)
RBC # FLD: 20.5 % — HIGH (ref 10.3–14.5)
SODIUM SERPL-SCNC: 138 MMOL/L — SIGNIFICANT CHANGE UP (ref 135–145)
TROPONIN I, HIGH SENSITIVITY RESULT: 22.6 NG/L — SIGNIFICANT CHANGE UP
WBC # BLD: 17.43 K/UL — HIGH (ref 3.8–10.5)
WBC # FLD AUTO: 17.43 K/UL — HIGH (ref 3.8–10.5)

## 2022-10-20 PROCEDURE — 99222 1ST HOSP IP/OBS MODERATE 55: CPT

## 2022-10-20 PROCEDURE — 99221 1ST HOSP IP/OBS SF/LOW 40: CPT

## 2022-10-20 PROCEDURE — 93010 ELECTROCARDIOGRAM REPORT: CPT

## 2022-10-20 RX ORDER — HYDRALAZINE HCL 50 MG
10 TABLET ORAL EVERY 4 HOURS
Refills: 0 | Status: DISCONTINUED | OUTPATIENT
Start: 2022-10-20 | End: 2022-10-25

## 2022-10-20 RX ORDER — ACETAMINOPHEN 500 MG
1000 TABLET ORAL ONCE
Refills: 0 | Status: DISCONTINUED | OUTPATIENT
Start: 2022-10-20 | End: 2022-10-20

## 2022-10-20 RX ORDER — LOSARTAN POTASSIUM 100 MG/1
100 TABLET, FILM COATED ORAL DAILY
Refills: 0 | Status: DISCONTINUED | OUTPATIENT
Start: 2022-10-20 | End: 2022-10-25

## 2022-10-20 RX ORDER — PANTOPRAZOLE SODIUM 20 MG/1
40 TABLET, DELAYED RELEASE ORAL
Refills: 0 | Status: DISCONTINUED | OUTPATIENT
Start: 2022-10-20 | End: 2022-10-25

## 2022-10-20 RX ORDER — HYDROMORPHONE HYDROCHLORIDE 2 MG/ML
0.5 INJECTION INTRAMUSCULAR; INTRAVENOUS; SUBCUTANEOUS
Refills: 0 | Status: DISCONTINUED | OUTPATIENT
Start: 2022-10-20 | End: 2022-10-20

## 2022-10-20 RX ORDER — SODIUM CHLORIDE 9 MG/ML
1000 INJECTION INTRAMUSCULAR; INTRAVENOUS; SUBCUTANEOUS
Refills: 0 | Status: DISCONTINUED | OUTPATIENT
Start: 2022-10-20 | End: 2022-10-21

## 2022-10-20 RX ORDER — ACETAMINOPHEN 500 MG
1000 TABLET ORAL ONCE
Refills: 0 | Status: COMPLETED | OUTPATIENT
Start: 2022-10-20 | End: 2022-10-21

## 2022-10-20 RX ORDER — OXYCODONE HYDROCHLORIDE 5 MG/1
10 TABLET ORAL EVERY 4 HOURS
Refills: 0 | Status: DISCONTINUED | OUTPATIENT
Start: 2022-10-20 | End: 2022-10-20

## 2022-10-20 RX ORDER — ACETAMINOPHEN 500 MG
1000 TABLET ORAL ONCE
Refills: 0 | Status: COMPLETED | OUTPATIENT
Start: 2022-10-20 | End: 2022-10-20

## 2022-10-20 RX ORDER — HYDROMORPHONE HYDROCHLORIDE 2 MG/ML
0.5 INJECTION INTRAMUSCULAR; INTRAVENOUS; SUBCUTANEOUS ONCE
Refills: 0 | Status: DISCONTINUED | OUTPATIENT
Start: 2022-10-20 | End: 2022-10-20

## 2022-10-20 RX ORDER — DIAZEPAM 5 MG
5 TABLET ORAL EVERY 6 HOURS
Refills: 0 | Status: DISCONTINUED | OUTPATIENT
Start: 2022-10-20 | End: 2022-10-20

## 2022-10-20 RX ORDER — HYDROMORPHONE HYDROCHLORIDE 2 MG/ML
30 INJECTION INTRAMUSCULAR; INTRAVENOUS; SUBCUTANEOUS
Refills: 0 | Status: DISCONTINUED | OUTPATIENT
Start: 2022-10-20 | End: 2022-10-20

## 2022-10-20 RX ORDER — NALOXONE HYDROCHLORIDE 4 MG/.1ML
0.1 SPRAY NASAL
Refills: 0 | Status: DISCONTINUED | OUTPATIENT
Start: 2022-10-20 | End: 2022-10-25

## 2022-10-20 RX ADMIN — HEPARIN SODIUM 5000 UNIT(S): 5000 INJECTION INTRAVENOUS; SUBCUTANEOUS at 21:40

## 2022-10-20 RX ADMIN — CYCLOBENZAPRINE HYDROCHLORIDE 10 MILLIGRAM(S): 10 TABLET, FILM COATED ORAL at 05:05

## 2022-10-20 RX ADMIN — HEPARIN SODIUM 5000 UNIT(S): 5000 INJECTION INTRAVENOUS; SUBCUTANEOUS at 10:19

## 2022-10-20 RX ADMIN — SODIUM CHLORIDE 65 MILLILITER(S): 9 INJECTION INTRAMUSCULAR; INTRAVENOUS; SUBCUTANEOUS at 21:41

## 2022-10-20 RX ADMIN — TAMSULOSIN HYDROCHLORIDE 0.4 MILLIGRAM(S): 0.4 CAPSULE ORAL at 00:17

## 2022-10-20 RX ADMIN — OXYCODONE HYDROCHLORIDE 5 MILLIGRAM(S): 5 TABLET ORAL at 05:23

## 2022-10-20 RX ADMIN — Medication 2: at 11:54

## 2022-10-20 RX ADMIN — Medication 10 MILLIGRAM(S): at 05:17

## 2022-10-20 RX ADMIN — FINASTERIDE 5 MILLIGRAM(S): 5 TABLET, FILM COATED ORAL at 10:18

## 2022-10-20 RX ADMIN — Medication 137 MICROGRAM(S): at 05:05

## 2022-10-20 RX ADMIN — Medication 1 TABLET(S): at 10:20

## 2022-10-20 RX ADMIN — Medication 5 MILLIGRAM(S): at 12:21

## 2022-10-20 RX ADMIN — Medication 400 MILLIGRAM(S): at 21:40

## 2022-10-20 RX ADMIN — TAMSULOSIN HYDROCHLORIDE 0.4 MILLIGRAM(S): 0.4 CAPSULE ORAL at 21:40

## 2022-10-20 RX ADMIN — INSULIN GLARGINE 15 UNIT(S): 100 INJECTION, SOLUTION SUBCUTANEOUS at 21:51

## 2022-10-20 RX ADMIN — HYDROMORPHONE HYDROCHLORIDE 30 MILLILITER(S): 2 INJECTION INTRAMUSCULAR; INTRAVENOUS; SUBCUTANEOUS at 10:53

## 2022-10-20 RX ADMIN — Medication 1000 MILLIGRAM(S): at 21:55

## 2022-10-20 RX ADMIN — ESCITALOPRAM OXALATE 20 MILLIGRAM(S): 10 TABLET, FILM COATED ORAL at 10:19

## 2022-10-20 RX ADMIN — LOSARTAN POTASSIUM 100 MILLIGRAM(S): 100 TABLET, FILM COATED ORAL at 12:21

## 2022-10-20 RX ADMIN — HYDROMORPHONE HYDROCHLORIDE 0.5 MILLIGRAM(S): 2 INJECTION INTRAMUSCULAR; INTRAVENOUS; SUBCUTANEOUS at 06:44

## 2022-10-20 RX ADMIN — ATORVASTATIN CALCIUM 40 MILLIGRAM(S): 80 TABLET, FILM COATED ORAL at 21:40

## 2022-10-20 RX ADMIN — Medication 1 MILLIGRAM(S): at 10:18

## 2022-10-20 RX ADMIN — SENNA PLUS 2 TABLET(S): 8.6 TABLET ORAL at 21:40

## 2022-10-20 RX ADMIN — POLYETHYLENE GLYCOL 3350 17 GRAM(S): 17 POWDER, FOR SOLUTION ORAL at 10:20

## 2022-10-20 RX ADMIN — OXYCODONE HYDROCHLORIDE 5 MILLIGRAM(S): 5 TABLET ORAL at 05:53

## 2022-10-20 NOTE — CONSULT NOTE ADULT - SUBJECTIVE AND OBJECTIVE BOX
PCP:  Dr Beka Abarca    CHIEF COMPLAINT: cervical spine myelopathy    HISTORY OF THE PRESENT ILLNESS:  this is a 71 yo male with pmh: bell's palsy, BPH, hyperparathyroidism, OA, CAD, MI x 2 ( 1983, 4/2022) recent CABG x 3 in April, 2022, has h/o multiple stents and balloon angioplasties, DM type 2, HLD, HTN, thyroid Ca, S/P thyroidectomy, s/p sepsis from cholecystitis, S/P cholecystectomy, PUD, depression and spinal stenosis with cc of numbness to left arm and hand, Now on 2 north S/P planned C3-7 laminoplasty. He is awake, alert, we are consulted for medical management    PAST MEDICAL HISTORY: as above    PAST SURGICAL HISTORY:thyroidectomy, CABG x 3, multi cardiac PTCA with stent implants and balloon angioplasties, hernia repair, colonoscopy, wil, gastric bypass    FAMILY HISTORY:   father: dec: larynx Ca and MI    SOCIAL HISTORY:  former smoker, 2ppd x 10 years, quit in 1973,pt admits to 1-2 drinks/2-4 times a month, wife states pt drinks daily, denies rec drugs    ALLERGIES: NKDA.  Intolerances:  levofloxacin----> joint pain,  ACE-I----->cough    HOME MEDS: see med rec    REVIEW OF SYSTEMS:   All 10 systems reviewed in detailed and found to be negative with the exception of what has already been described above    MEDICATIONS  (STANDING):  acetaminophen   IVPB .. 1000 milliGRAM(s) IV Intermittent once  atorvastatin 40 milliGRAM(s) Oral at bedtime  benzocaine 15 mG/menthol 3.6 mG Lozenge 1 Lozenge Oral every 3 hours  escitalopram 20 milliGRAM(s) Oral daily  finasteride 5 milliGRAM(s) Oral daily  folic acid 1 milliGRAM(s) Oral daily  glucagon  Injectable 1 milliGRAM(s) IntraMuscular once  heparin   Injectable 5000 Unit(s) SubCutaneous every 12 hours  HYDROmorphone PCA (1 mG/mL) 30 milliLiter(s) PCA Continuous PCA Continuous  insulin glargine Injectable (LANTUS) 15 Unit(s) SubCutaneous at bedtime  insulin lispro (ADMELOG) corrective regimen sliding scale   SubCutaneous three times a day before meals  insulin lispro (ADMELOG) corrective regimen sliding scale   SubCutaneous at bedtime  levothyroxine 137 MICROGram(s) Oral daily  losartan 100 milliGRAM(s) Oral daily  multivitamin 1 Tablet(s) Oral daily  polyethylene glycol 3350 17 Gram(s) Oral daily  propranolol 80 milliGRAM(s) Oral every 12 hours  senna 2 Tablet(s) Oral at bedtime  tamsulosin 0.4 milliGRAM(s) Oral at bedtime    MEDICATIONS  (PRN):  dextrose Oral Gel 15 Gram(s) Oral once PRN Blood Glucose LESS THAN 70 milliGRAM(s)/deciliter  diazepam    Tablet 5 milliGRAM(s) Oral every 6 hours PRN muscle spasm  famotidine    Tablet 20 milliGRAM(s) Oral every 12 hours PRN Dyspepsia  hydrALAZINE 10 milliGRAM(s) Oral every 4 hours PRN Systolic blood pressure >160  HYDROmorphone PCA (1 mG/mL) Rescue Clinician Bolus 0.5 milliGRAM(s) IV Push every 15 minutes PRN for Pain Scale GREATER THAN 6  naloxone Injectable 0.1 milliGRAM(s) IV Push every 3 minutes PRN For ANY of the following changes in patient status:  A. RR LESS THAN 10 breaths per minute, B. Oxygen saturation LESS THAN 90%, C. Sedation score of 6  ondansetron Injectable 4 milliGRAM(s) IV Push every 6 hours PRN Nausea and/or Vomiting  traZODone 100 milliGRAM(s) Oral at bedtime PRN insomnia      VITALS:  T(F): 97.7 (10-20-22 @ 08:55), Max: 98.7 (10-19-22 @ 19:15)  HR: 63 (10-20-22 @ 08:55) (63 - 75)  BP: 165/54 (10-20-22 @ 08:55) (102/66 - 193/79)  RR: 17 (10-20-22 @ 08:55) (14 - 18)  SpO2: 99% (10-20-22 @ 08:55) (93% - 100%)  Wt(kg): --    I&O's Summary    19 Oct 2022 07:01  -  20 Oct 2022 07:00  --------------------------------------------------------  IN: 0 mL / OUT: 500 mL / NET: -500 mL        CAPILLARY BLOOD GLUCOSE      POCT Blood Glucose.: 155 mg/dL (20 Oct 2022 11:14)  POCT Blood Glucose.: 148 mg/dL (20 Oct 2022 06:00)  POCT Blood Glucose.: 180 mg/dL (19 Oct 2022 21:39)    PHYSICAL EXAM:    GENERAL: Comfortable, no acute distress   HEAD:  Normocephalic, atraumatic  EYES: EOMI, PERRLA  HEENT: Moist mucous membranes  NECK: Supple, No JVD  NERVOUS SYSTEM:  Alert & Oriented X3, Motor Strength 5/5 B/L upper and lower extremities  CHEST/LUNG: Clear to auscultation bilaterally  HEART: Regular rate and rhythm  ABDOMEN: Soft, non tender, Nondistended, Bowel sounds present  GENITOURINARY: Voiding, no palpable bladder  EXTREMITIES:   No clubbing, cyanosis, or edema  MUSCULOSKELETAL- c spine  SKIN-no rash      LABS:                        9.7    17.43 )-----------( 180      ( 20 Oct 2022 09:00 )             30.9     10-20    138  |  104  |  13  ----------------------------<  146<H>  4.1   |  29  |  0.70    Ca    9.4      20 Oct 2022 09:00  Phos  2.0     10-20  Mg     2.1     10-20    TPro  6.6  /  Alb  3.3  /  TBili  0.5  /  DBili  x   /  AST  20  /  ALT  19  /  AlkPhos  51  10-20        LIVER FUNCTIONS - ( 20 Oct 2022 09:00 )  Alb: 3.3 g/dL / Pro: 6.6 gm/dL / ALK PHOS: 51 U/L / ALT: 19 U/L / AST: 20 U/L / GGT: x             IMPRESSION:  71 yo male with above pmh a/w:    # cervical spine stenosis/myelopathy  # S/P cervical laminoplasty, 3 levels  admitted to spine  wound care per spine  pain control with dilaudid PCA  C-collar  PT eval  valium prn fo rmuscle spasms  I&o  bowel regimen    #leukocytosis  likely reactive  R/T surgery  afebrile, no s/s of infn  monitor wbc    # anemia, Chronic??  per chart review pt with low hgb post cabg  seems to have only improved to ~10.0  last upper endo 2012, last colonoscopy 2015    #CAD with h/o CABG x 3, multi stents  resume ASA when ok with surgery  cont statin    #HTN  borderline hypertensio  could be r/t pain, though ETOH withdrawal is a concern  seen by cardio  resume losartan/BB    #depression  cont lexapro      #hypothyroidism  cont levothyroxine      #DM type 2  controlled  A1C 6.2  hold OHAS  BGm/ss  Lantus      # H/O GERD/PUD  cont ppi      #VTE prophylaxis  hep sq  venodynes  amb with PT    thank you for the consult, will follow           PCP:  Dr Beka Abarca    CHIEF COMPLAINT: cervical spine myelopathy    HISTORY OF THE PRESENT ILLNESS:  this is a 71 yo male with pmh: bell's palsy, BPH, hyperparathyroidism, OA, CAD, MI x 2 ( 1983, 4/2022) recent CABG x 3 in April, 2022, has h/o multiple stents and balloon angioplasties, DM type 2, HLD, HTN, thyroid Ca, S/P thyroidectomy, s/p sepsis from cholecystitis, S/P cholecystectomy, PUD, depression and spinal stenosis with cc of numbness to left arm and hand, Now on 2 north S/P planned C3-7 laminoplasty. Wife present,  pt is sleeping but awakens to his name. answers questions appropriately.  States neck pain is an "8" but not using his PCA and falls back to sleep during our conversation.  Recent b/p 159/63.  denies any cp or sob.  We are consulted for medical management    PAST MEDICAL HISTORY: as above    PAST SURGICAL HISTORY: thyroidectomy, CABG x 3, multi cardiac PTCA with stent implants and balloon angioplasties, hernia repair, colonoscopy, wil, gastric bypass    FAMILY HISTORY:   father: dec: larynx Ca and MI    SOCIAL HISTORY:  former smoker, 2ppd x 10 years, quit in 1973,pt admits to 1-2 drinks/2-4 times a month, wife states pt drinks daily, denies rec drugs    ALLERGIES: NKDA.  Intolerances:  levofloxacin----> joint pain,  ACE-I----->cough    HOME MEDS: see med rec    REVIEW OF SYSTEMS:   All 10 systems reviewed in detailed and found to be negative with the exception of what has already been described above    MEDICATIONS  (STANDING):  acetaminophen   IVPB .. 1000 milliGRAM(s) IV Intermittent once  atorvastatin 40 milliGRAM(s) Oral at bedtime  benzocaine 15 mG/menthol 3.6 mG Lozenge 1 Lozenge Oral every 3 hours  escitalopram 20 milliGRAM(s) Oral daily  finasteride 5 milliGRAM(s) Oral daily  folic acid 1 milliGRAM(s) Oral daily  glucagon  Injectable 1 milliGRAM(s) IntraMuscular once  heparin   Injectable 5000 Unit(s) SubCutaneous every 12 hours  HYDROmorphone PCA (1 mG/mL) 30 milliLiter(s) PCA Continuous PCA Continuous  insulin glargine Injectable (LANTUS) 15 Unit(s) SubCutaneous at bedtime  insulin lispro (ADMELOG) corrective regimen sliding scale   SubCutaneous three times a day before meals  insulin lispro (ADMELOG) corrective regimen sliding scale   SubCutaneous at bedtime  levothyroxine 137 MICROGram(s) Oral daily  losartan 100 milliGRAM(s) Oral daily  multivitamin 1 Tablet(s) Oral daily  polyethylene glycol 3350 17 Gram(s) Oral daily  propranolol 80 milliGRAM(s) Oral every 12 hours  senna 2 Tablet(s) Oral at bedtime  tamsulosin 0.4 milliGRAM(s) Oral at bedtime    MEDICATIONS  (PRN):  dextrose Oral Gel 15 Gram(s) Oral once PRN Blood Glucose LESS THAN 70 milliGRAM(s)/deciliter  diazepam    Tablet 5 milliGRAM(s) Oral every 6 hours PRN muscle spasm  famotidine    Tablet 20 milliGRAM(s) Oral every 12 hours PRN Dyspepsia  hydrALAZINE 10 milliGRAM(s) Oral every 4 hours PRN Systolic blood pressure >160  HYDROmorphone PCA (1 mG/mL) Rescue Clinician Bolus 0.5 milliGRAM(s) IV Push every 15 minutes PRN for Pain Scale GREATER THAN 6  naloxone Injectable 0.1 milliGRAM(s) IV Push every 3 minutes PRN For ANY of the following changes in patient status:  A. RR LESS THAN 10 breaths per minute, B. Oxygen saturation LESS THAN 90%, C. Sedation score of 6  ondansetron Injectable 4 milliGRAM(s) IV Push every 6 hours PRN Nausea and/or Vomiting  traZODone 100 milliGRAM(s) Oral at bedtime PRN insomnia      Vital Signs Last 24 Hrs  T(C): 36.8 (10-20-22 @ 14:41), Max: 37.1 (10-19-22 @ 19:15)  T(F): 98.2 (10-20-22 @ 14:41), Max: 98.7 (10-19-22 @ 19:15)  HR: 72 (10-20-22 @ 14:41) (63 - 75)  BP: 159/63 (10-20-22 @ 14:41) (102/66 - 193/79)  BP(mean): --  RR: 17 (10-20-22 @ 14:41) (14 - 18)  SpO2: 95% (10-20-22 @ 14:41) (93% - 100%)    CAPILLARY BLOOD GLUCOSE      POCT Blood Glucose.: 155 mg/dL (20 Oct 2022 11:14)  POCT Blood Glucose.: 148 mg/dL (20 Oct 2022 06:00)  POCT Blood Glucose.: 180 mg/dL (19 Oct 2022 21:39)    PHYSICAL EXAM:    GENERAL: Comfortable, no acute distress   HEAD:  Normocephalic, atraumatic  EYES: EOMI, PERRLA  HEENT: Moist mucous membranes  NECK: Supple, No JVD  NERVOUS SYSTEM:  Alert & Oriented X3, Motor Strength 5/5 B/L upper and lower extremities  CHEST/LUNG: Clear to auscultation bilaterally  HEART: Regular rate and rhythm  ABDOMEN: Soft, non tender, Nondistended, Bowel sounds present  GENITOURINARY: Voiding, no palpable bladder  EXTREMITIES:   No clubbing, cyanosis, or edema  MUSCULOSKELETAL- c spine posterior neck dsg c/d/i  SKIN-no rash      LABS:                        9.7    17.43 )-----------( 180      ( 20 Oct 2022 09:00 )             30.9     10-20    138  |  104  |  13  ----------------------------<  146<H>  4.1   |  29  |  0.70    Ca    9.4      20 Oct 2022 09:00  Phos  2.0     10-20  Mg     2.1     10-20    TPro  6.6  /  Alb  3.3  /  TBili  0.5  /  DBili  x   /  AST  20  /  ALT  19  /  AlkPhos  51  10-20        LIVER FUNCTIONS - ( 20 Oct 2022 09:00 )  Alb: 3.3 g/dL / Pro: 6.6 gm/dL / ALK PHOS: 51 U/L / ALT: 19 U/L / AST: 20 U/L / GGT: x             IMPRESSION:  71 yo male with above pmh a/w:    # cervical spine stenosis/myelopathy  # S/P cervical laminoplasty, 3 levels  admitted to spine  wound care per spine  pain control with Dilaudid PCA  PT eval  valium prn for muscle spasms  I&0  bowel regimen    #leukocytosis  likely reactive  R/T surgery  afebrile, no s/s of infn  monitor wbc    # anemia, microcytic  per chart review pt with low hgb post cabg in April and never rebounded fully  seems to have only improved to ~10.0  last upper endo 2012? last colonoscopy 2019 with 1 benign polyp removed  sees Heme Dr Bauer at [facility]  pt receiving ferritin infusions      #CAD with h/o CABG x 3, multi stents  last stent 2011  resume ASA when ok with surgery  cont statin    #HTN  borderline hypertension  could be r/t pain  EtOH abuse mentioned in H&P; wife states he only drinks 2 x a week  no obvius signs of withdrawal presently  will cont to monitor   seen by cardio  resume losartan/BB    #depression  cont lexapro      #hypothyroidism  cont levothyroxine      #DM type 2  controlled  A1C 6.2  uses freestyle Eula but removed it for surgery  wife to bring in to avoid multi fingersticks  hold OHAS  BGm/ss  Lantus      # H/O GERD/PUD  PPI    #BPH  resume finesteride/tamsulosin      #VTE prophylaxis  hep sq  venodynes  amb with PT    thank you for the consult, will follow           PCP:  Dr Beka Abarca    CHIEF COMPLAINT: cervical spine myelopathy    HISTORY OF THE PRESENT ILLNESS:  this is a 73 yo male with pmh: bell's palsy, BPH, hyperparathyroidism, OA, CAD, MI x 2 ( 1983, 4/2022) recent CABG x 3 in April, 2022, has h/o multiple stents and balloon angioplasties, DM type 2, HLD, HTN, thyroid Ca, S/P thyroidectomy, s/p sepsis from cholecystitis, S/P cholecystectomy, PUD, depression and spinal stenosis with cc of numbness to left arm and hand, Now on 2 north S/P planned C3-7 laminoplasty. Wife present,  pt is sleeping but awakens to his name. answers questions appropriately.  States neck pain is an "8" but not using his PCA and falls back to sleep during our conversation.  Recent b/p 159/63.  denies any cp or sob.  We are consulted for medical management    PAST MEDICAL HISTORY: as above    PAST SURGICAL HISTORY: thyroidectomy, CABG x 3, multi cardiac PTCA with stent implants and balloon angioplasties, hernia repair, colonoscopy, wil, gastric bypass    FAMILY HISTORY:   father: dec: larynx Ca and MI    SOCIAL HISTORY:  former smoker, 2ppd x 10 years, quit in 1973,pt admits to 1-2 drinks/2-4 times a month, wife states pt drinks daily, denies rec drugs    ALLERGIES: NKDA.  Intolerances:  levofloxacin----> joint pain,  ACE-I----->cough    HOME MEDS: see med rec    REVIEW OF SYSTEMS:   All 10 systems reviewed in detailed and found to be negative with the exception of what has already been described above    MEDICATIONS  (STANDING):  acetaminophen   IVPB .. 1000 milliGRAM(s) IV Intermittent once  atorvastatin 40 milliGRAM(s) Oral at bedtime  benzocaine 15 mG/menthol 3.6 mG Lozenge 1 Lozenge Oral every 3 hours  escitalopram 20 milliGRAM(s) Oral daily  finasteride 5 milliGRAM(s) Oral daily  folic acid 1 milliGRAM(s) Oral daily  glucagon  Injectable 1 milliGRAM(s) IntraMuscular once  heparin   Injectable 5000 Unit(s) SubCutaneous every 12 hours  HYDROmorphone PCA (1 mG/mL) 30 milliLiter(s) PCA Continuous PCA Continuous  insulin glargine Injectable (LANTUS) 15 Unit(s) SubCutaneous at bedtime  insulin lispro (ADMELOG) corrective regimen sliding scale   SubCutaneous three times a day before meals  insulin lispro (ADMELOG) corrective regimen sliding scale   SubCutaneous at bedtime  levothyroxine 137 MICROGram(s) Oral daily  losartan 100 milliGRAM(s) Oral daily  multivitamin 1 Tablet(s) Oral daily  polyethylene glycol 3350 17 Gram(s) Oral daily  propranolol 80 milliGRAM(s) Oral every 12 hours  senna 2 Tablet(s) Oral at bedtime  tamsulosin 0.4 milliGRAM(s) Oral at bedtime    MEDICATIONS  (PRN):  dextrose Oral Gel 15 Gram(s) Oral once PRN Blood Glucose LESS THAN 70 milliGRAM(s)/deciliter  diazepam    Tablet 5 milliGRAM(s) Oral every 6 hours PRN muscle spasm  famotidine    Tablet 20 milliGRAM(s) Oral every 12 hours PRN Dyspepsia  hydrALAZINE 10 milliGRAM(s) Oral every 4 hours PRN Systolic blood pressure >160  HYDROmorphone PCA (1 mG/mL) Rescue Clinician Bolus 0.5 milliGRAM(s) IV Push every 15 minutes PRN for Pain Scale GREATER THAN 6  naloxone Injectable 0.1 milliGRAM(s) IV Push every 3 minutes PRN For ANY of the following changes in patient status:  A. RR LESS THAN 10 breaths per minute, B. Oxygen saturation LESS THAN 90%, C. Sedation score of 6  ondansetron Injectable 4 milliGRAM(s) IV Push every 6 hours PRN Nausea and/or Vomiting  traZODone 100 milliGRAM(s) Oral at bedtime PRN insomnia      Vital Signs Last 24 Hrs  T(C): 36.8 (10-20-22 @ 14:41), Max: 37.1 (10-19-22 @ 19:15)  T(F): 98.2 (10-20-22 @ 14:41), Max: 98.7 (10-19-22 @ 19:15)  HR: 72 (10-20-22 @ 14:41) (63 - 75)  BP: 159/63 (10-20-22 @ 14:41) (102/66 - 193/79)  BP(mean): --  RR: 17 (10-20-22 @ 14:41) (14 - 18)  SpO2: 95% (10-20-22 @ 14:41) (93% - 100%)    CAPILLARY BLOOD GLUCOSE      POCT Blood Glucose.: 155 mg/dL (20 Oct 2022 11:14)  POCT Blood Glucose.: 148 mg/dL (20 Oct 2022 06:00)  POCT Blood Glucose.: 180 mg/dL (19 Oct 2022 21:39)    PHYSICAL EXAM:    GENERAL: Comfortable, no acute distress   HEAD:  Normocephalic, atraumatic  EYES: EOMI, PERRLA  HEENT: Moist mucous membranes  NECK: Supple, No JVD  NERVOUS SYSTEM:  Alert & Oriented X3, Motor Strength 5/5 B/L upper and lower extremities  CHEST/LUNG: Clear to auscultation bilaterally  HEART: Regular rate and rhythm  ABDOMEN: Soft, non tender, Nondistended, Bowel sounds present  GENITOURINARY: Voiding, no palpable bladder  EXTREMITIES:   No clubbing, cyanosis, or edema  MUSCULOSKELETAL- c spine posterior neck dsg c/d/i  SKIN-no rash      LABS:                        9.7    17.43 )-----------( 180      ( 20 Oct 2022 09:00 )             30.9     10-20    138  |  104  |  13  ----------------------------<  146<H>  4.1   |  29  |  0.70    Ca    9.4      20 Oct 2022 09:00  Phos  2.0     10-20  Mg     2.1     10-20    TPro  6.6  /  Alb  3.3  /  TBili  0.5  /  DBili  x   /  AST  20  /  ALT  19  /  AlkPhos  51  10-20        LIVER FUNCTIONS - ( 20 Oct 2022 09:00 )  Alb: 3.3 g/dL / Pro: 6.6 gm/dL / ALK PHOS: 51 U/L / ALT: 19 U/L / AST: 20 U/L / GGT: x             IMPRESSION:  73 yo male with above pmh a/w:    # cervical spine stenosis/myelopathy  # S/P cervical laminoplasty, 3 levels  admitted to spine  wound care per spine  pain control with Dilaudid PCA  PT eval  valium prn for muscle spasms  I&0  bowel regimen    #leukocytosis  likely reactive  R/T surgery  afebrile, no s/s of infn  monitor wbc    # Iron deficiency anemia with acute blood loss anemia related to surgery  -monitor h/h  -last upper endo 2012? last colonoscopy 2019 with 1 benign polyp removed  -sees Heme Dr Bauer at [facility]  -pt receiving ferritin infusions      #CAD with h/o CABG x 3, multi stents  last stent 2011  resume ASA when ok with surgery  cont statin    #HTN  uncontrolled.   pain likely contributing  also losartan has been on hold.   EtOH abuse mentioned in H&P; wife states he only drinks 2 x a week  no obvious signs of withdrawal presently  continue bb  losartan resumed.    #depression  cont lexapro      #hypothyroidism  cont levothyroxine      #DM type 2  controlled  A1C 6.2  uses freestyle Eula but removed it for surgery  wife to bring in to avoid multi fingersticks  hold OHAS  BGm/ss  Lantus      # H/O GERD/PUD  PPI    #BPH  resume finesteride/tamsulosin      #VTE prophylaxis  hep sq  venodynes  amb with PT    thank you for the consult, will follow

## 2022-10-20 NOTE — PROGRESS NOTE ADULT - ASSESSMENT
72 year old male POD#1 C3-6 Laminoplasty  Cardiology consulted appreciated,  for hypertension postop  Hospitalist consulted to follow along  PCA started, flexeril changed to valium  am labs ordered  monitor for withdrawal  soft collar for comfort  OOB, PT evaluation   started heparin SQ for DVT prophylaxis  incentive spirometry  d/w Dr Keller

## 2022-10-20 NOTE — CONSULT NOTE ADULT - PROBLEM SELECTOR RECOMMENDATION 9
Uncontrolled HTN -- I suspect that post-op pain is principle etiology (PCA is planned) but am also concerned about potential for alcohol withdrawal (I discussed with RN) and discontinuation of losartan.      Resume losartan 100 mg daily

## 2022-10-20 NOTE — PROGRESS NOTE ADULT - SUBJECTIVE AND OBJECTIVE BOX
72 year old man with a cardiac history significant for severe CAD, MI, remote coronary stents, recent CABG x 3 (4/2022), HTN, HLD, normal LV function, who was admitted on 10/19/22 for elective C3-6 Laminoplasty from the left post cervical spine. Postoperative had increased blood pressure over night due to pain    10/20 POD#1 C3-6 Laminoplasty, cardiology consulted for blood pressure management, PCA ordered, voiding spontaneously overnight after some retention (figueroa never placed Hospitalist also consulted to follow along    ICU Vital Signs Last 24 Hrs  T(C): 36.5 (20 Oct 2022 08:55), Max: 37.1 (19 Oct 2022 19:15)  T(F): 97.7 (20 Oct 2022 08:55), Max: 98.7 (19 Oct 2022 19:15)  HR: 63 (20 Oct 2022 08:55) (63 - 78)  BP: 165/54 (20 Oct 2022 08:55) (102/66 - 193/79)  BP(mean): --  ABP: 118/50 (19 Oct 2022 14:00) (115/47 - 172/72)  ABP(mean): 105 (19 Oct 2022 12:30) (105 - 105)  RR: 17 (20 Oct 2022 08:55) (10 - 18)  SpO2: 99% (20 Oct 2022 08:55) (93% - 100%)    O2 Parameters below as of 20 Oct 2022 08:55  Patient On (Oxygen Delivery Method): room air        MEDICATIONS  (STANDING):  acetaminophen   IVPB .. 1000 milliGRAM(s) IV Intermittent once  atorvastatin 40 milliGRAM(s) Oral at bedtime  benzocaine 15 mG/menthol 3.6 mG Lozenge 1 Lozenge Oral every 3 hours  escitalopram 20 milliGRAM(s) Oral daily  finasteride 5 milliGRAM(s) Oral daily  folic acid 1 milliGRAM(s) Oral daily  glucagon  Injectable 1 milliGRAM(s) IntraMuscular once  heparin   Injectable 5000 Unit(s) SubCutaneous every 12 hours  HYDROmorphone PCA (1 mG/mL) 30 milliLiter(s) PCA Continuous PCA Continuous  insulin glargine Injectable (LANTUS) 15 Unit(s) SubCutaneous at bedtime  insulin lispro (ADMELOG) corrective regimen sliding scale   SubCutaneous three times a day before meals  insulin lispro (ADMELOG) corrective regimen sliding scale   SubCutaneous at bedtime  levothyroxine 137 MICROGram(s) Oral daily  losartan 100 milliGRAM(s) Oral daily  multivitamin 1 Tablet(s) Oral daily  polyethylene glycol 3350 17 Gram(s) Oral daily  propranolol 80 milliGRAM(s) Oral every 12 hours  senna 2 Tablet(s) Oral at bedtime  tamsulosin 0.4 milliGRAM(s) Oral at bedtime    MEDICATIONS  (PRN):  dextrose Oral Gel 15 Gram(s) Oral once PRN Blood Glucose LESS THAN 70 milliGRAM(s)/deciliter  diazepam    Tablet 5 milliGRAM(s) Oral every 6 hours PRN muscle spasm  famotidine    Tablet 20 milliGRAM(s) Oral every 12 hours PRN Dyspepsia  hydrALAZINE 10 milliGRAM(s) Oral every 4 hours PRN Systolic blood pressure >160  HYDROmorphone PCA (1 mG/mL) Rescue Clinician Bolus 0.5 milliGRAM(s) IV Push every 15 minutes PRN for Pain Scale GREATER THAN 6  naloxone Injectable 0.1 milliGRAM(s) IV Push every 3 minutes PRN For ANY of the following changes in patient status:  A. RR LESS THAN 10 breaths per minute, B. Oxygen saturation LESS THAN 90%, C. Sedation score of 6  ondansetron Injectable 4 milliGRAM(s) IV Push every 6 hours PRN Nausea and/or Vomiting  traZODone 100 milliGRAM(s) Oral at bedtime PRN insomnia                          9.7    17.43 )-----------( 180      ( 20 Oct 2022 09:00 )             30.9   10-20    138  |  104  |  13  ----------------------------<  146<H>  4.1   |  29  |  0.70    Ca    9.4      20 Oct 2022 09:00  Phos  2.0     10-20  Mg     2.1     10-20    TPro  6.6  /  Alb  3.3  /  TBili  0.5  /  DBili  x   /  AST  20  /  ALT  19  /  AlkPhos  51  10-20    ROS: c/o posterior cervical muscle spasm, denies chest pain, SOB, palpitations, numbness or tingling    PHYSICAL EXAM:  Constitutional: seemingly irritable, uncooperative with exam  HEENT:  Pupils reactive, sclera clear, EOMI, no nystagmus  Pulmonary: Non-labored, breath sounds are clear bilaterally, No wheezing, rales or rhonchi  Cardiovascular: S1 and S2, regular rate and rhythm  Gastrointestinal: Bowel Sounds present, soft, nontender.   Lymph: No peripheral edema. No cervical lymphadenopathy.  Neurological: Alert, no focal deficits; + mild tremor  Skin: No rashes.    Neurologic  Exam:    Awake, alert sitting up in chair  when asked where he was he states "here"  follows some commands, will not cooperative with some of exam and states no  when asked he states because I don't want to do it  Pupils reactive bilat, EOMI, no nystagmus  speech slow but clear, keeps eyes closed for exam  dressing intact with some serous sanginous drainage  bilateral UE 4/5, bilat LE 4+/5  sensation grossly intact    when asked about alcohol consumption he became more agitated  and said that he did not drink every day and if I ask again that he was going to "hit me"  As he just spoke with cardiology and stated that he drank daily  This was confirmed by the RN after speaking with his wife

## 2022-10-20 NOTE — PHYSICAL THERAPY INITIAL EVALUATION ADULT - PERTINENT HX OF CURRENT PROBLEM, REHAB EVAL
As per H&P PST: 73 y/o male with stenosis of cervical spine with myelopathy, PMHx of CAD, S/P CABG x 3 vessels on 04/2022, s/p multiple stents and balloon angioplasties, MI (1983, 04/2022 ), DM II,  HLD, HTN, thyroid cancer s/p thyroidectomy, cholecystitis s/p sepsis requiring cholecystectomy, PUD, s/p gastric bypass, spinal stenosis, depression. Pt complain of numbness to left arm and left hand, he denies neck pain.

## 2022-10-20 NOTE — PHYSICAL THERAPY INITIAL EVALUATION ADULT - RANGE OF MOTION EXAMINATION, REHAB EVAL
"Cervical collar for comfort" was attempted but the pt preferred to ambulate without it. General cervical spinal precautions maintained

## 2022-10-20 NOTE — PHYSICAL THERAPY INITIAL EVALUATION ADULT - GENERAL OBSERVATIONS, REHAB EVAL
The pt was received on 2N, sitting OOB and in a chair, the pt had 1 IV, 1 PCA, bilateral SCDs and a chair alarm in place. The pt's spouse was present bedside. Several attempts were made today to get the patient out of the chair and to participate with PT. On multiple attempts the pt was limited by reactivity and difficulty advancing from sitting in a reclined position on a high back chair to a upright sitting position in preparation for sit to stand training. Of note: the pt's RN reports that the patient has been getting OOB and walking to and from the bathroom with staff a few times today prior to each PT attempt.

## 2022-10-20 NOTE — CONSULT NOTE ADULT - PROBLEM SELECTOR RECOMMENDATION 2
S/p CABG in April 2022; stable; normal ECG; continue atorvastatin; resume aspirin when ok from surgical standpoint.

## 2022-10-20 NOTE — CHART NOTE - NSCHARTNOTEFT_GEN_A_CORE
Called by RN family at bedside concerned about patients confusion.  Patient awake, alert, follows commands, he is oriented to self /hospital but is unable to state year, month, season or recent events as discussed with wife..  Moving all extremities well antigravity, states his numbness/tinging is gone and his strength in his hands is improving.  He has a baseline tremor in bilateral UEs as per patient & family at bedside. (Wife & daughter Fara)  Plan to discontinue PCA, valium & Trazadone.  Will give IV tylenol & start IVF.  If needed will start toradol when more awake  will continue to monitor  This plan was discussed with family at bedside & Dr Keller

## 2022-10-20 NOTE — CONSULT NOTE ADULT - SUBJECTIVE AND OBJECTIVE BOX
CHIEF COMPLAINT: Patient is a 72y old  Male who presents with a chief complaint of severe back pain    HPI: 72 year old man with a cardiac history significant for severe CAD, MI, remote coronary stents, recent CABG x 3 (4/2022), HTN, HLD, normal LV function, who was admitted on 10/19/22 for elective C3-6 Laminoplasty from the left post cervical spine.  I was called to assist with poorly-controlled HTN.  He describes previously good BP control (also noted in pre-op note by his outpatient cardiologist) and says that he has been doing well from a cardiac standpoint in recent months.  He denies angina.  He describes severe 9 out of 10 post-op spine pain - relief with pain meds; worsens with movement. He presently has no cardiac symptoms.      PAST MEDICAL & SURGICAL HISTORY:  Type 2 diabetes mellitus  HTN (hypertension)  HLD (hyperlipidemia)  Thyroid cancer 2018  CAD (coronary artery disease)  Myocardial infarction 1983, 04/2022 S/P CABG  Spinal stenosis cervical  OA (osteoarthritis) of knee  Depression, major  H/O cholecystitis  BPH (benign prostatic hyperplasia)  ASHD (arteriosclerotic heart disease)  Bell's palsy  Proteinuria  Erectile dysfunction  OA (osteoarthritis)  S/P colonoscopy lymphoid polyp 2009  AI (aortic insufficiency)  Other specified sepsis 2014 gram negative sepsis  Bile duct stone removed, sphincter of Oddi dysfunction  Skin cancer of head - removed - non melanoma  Hyperparathyroidism  Anxiety and depression  Hypothyroidism  S/P gastric bypass complicated by PUD 2010  H/O thyroidectomy  S/P cholecystectomy  S/P hernia repair  S/P CABG x 3 04/2022 at [facility]  S/P PTCA (percutaneous transluminal coronary angioplasty) 08/2001, 03/2001, 08/2000, 07/2000, 06/2000 total of 8 stents prior to CABG in 04/2022  S/P colonoscopy    SOCIAL HISTORY:   Alcohol: patient says he drinks "a lot" and nearly every day  Smoking: Past smoker    FAMILY HISTORY:   Myocardial infarction (Father)  Cancer of larynx (Father)    MEDICATIONS  (STANDING):  acetaminophen   IVPB .. 1000 milliGRAM(s) IV Intermittent once  atorvastatin 40 milliGRAM(s) Oral at bedtime  benzocaine 15 mG/menthol 3.6 mG Lozenge 1 Lozenge Oral every 3 hours  cyclobenzaprine 10 milliGRAM(s) Oral every 8 hours  escitalopram 20 milliGRAM(s) Oral daily  finasteride 5 milliGRAM(s) Oral daily  folic acid 1 milliGRAM(s) Oral daily  glucagon  Injectable 1 milliGRAM(s) IntraMuscular once  heparin   Injectable 5000 Unit(s) SubCutaneous every 12 hours  HYDROmorphone PCA (1 mG/mL) 30 milliLiter(s) PCA Continuous PCA Continuous  insulin glargine Injectable (LANTUS) 15 Unit(s) SubCutaneous at bedtime  insulin lispro (ADMELOG) corrective regimen sliding scale   SubCutaneous three times a day before meals  insulin lispro (ADMELOG) corrective regimen sliding scale   SubCutaneous at bedtime  levothyroxine 137 MICROGram(s) Oral daily  multivitamin 1 Tablet(s) Oral daily  polyethylene glycol 3350 17 Gram(s) Oral daily  propranolol 80 milliGRAM(s) Oral every 12 hours  senna 2 Tablet(s) Oral at bedtime  sodium chloride 0.9%. 1000 milliLiter(s) (50 mL/Hr) IV Continuous <Continuous>  tamsulosin 0.4 milliGRAM(s) Oral at bedtime  tranexamic acid IVPB 1000 milliGRAM(s) IV Intermittent once    MEDICATIONS  (PRN):  dextrose Oral Gel 15 Gram(s) Oral once PRN Blood Glucose LESS THAN 70 milliGRAM(s)/deciliter  famotidine    Tablet 20 milliGRAM(s) Oral every 12 hours PRN Dyspepsia  hydrALAZINE 10 milliGRAM(s) Oral every 4 hours PRN Systolic blood pressure >160  HYDROmorphone PCA (1 mG/mL) Rescue Clinician Bolus 0.5 milliGRAM(s) IV Push every 15 minutes PRN for Pain Scale GREATER THAN 6  naloxone Injectable 0.1 milliGRAM(s) IV Push every 3 minutes PRN For ANY of the following changes in patient status:  A. RR LESS THAN 10 breaths per minute, B. Oxygen saturation LESS THAN 90%, C. Sedation score of 6  ondansetron Injectable 4 milliGRAM(s) IV Push every 6 hours PRN Nausea and/or Vomiting  oxyCODONE    IR 5 milliGRAM(s) Oral every 6 hours PRN Moderate Pain (4 - 6)  oxyCODONE    IR 10 milliGRAM(s) Oral every 4 hours PRN Moderate Pain (4 - 6)  traZODone 100 milliGRAM(s) Oral at bedtime PRN insomnia    Allergies: No Known Allergies    Intolerances:  ACE inhibitors (Other)  levofloxacin (Joint Pain)    REVIEW OF SYSTEMS:  CONSTITUTIONAL: No fevers or chills  Eyes: No visual changes  NECK: No pain or stiffness  RESPIRATORY: No cough, wheezing, hemoptysis; No shortness of breath  CARDIOVASCULAR: No chest pain or palpitations  GASTROINTESTINAL: No abdominal pain. No nausea, vomiting, or hematemesis; No diarrhea or constipation. No melena or hematochezia.  GENITOURINARY: No dysuria, frequency or hematuria  NEUROLOGICAL: No numbness.  SKIN: No itching or rash  MSK:  Severe back/spine pain  All other review of systems is negative unless indicated above    VITAL SIGNS:   Vital Signs Last 24 Hrs  T(C): 36.5 (20 Oct 2022 08:55), Max: 37.1 (19 Oct 2022 19:15)  T(F): 97.7 (20 Oct 2022 08:55), Max: 98.7 (19 Oct 2022 19:15)  HR: 63 (20 Oct 2022 08:55) (63 - 78)  BP: 165/54 (20 Oct 2022 08:55) (102/66 - 193/79)  RR: 17 (20 Oct 2022 08:55) (10 - 18)  SpO2: 99% (20 Oct 2022 08:55) (93% - 100%)    PHYSICAL EXAM:  Constitutional: NAD, awake and alert, seated in bedside chair  HEENT:  EOMI,  Pupils round, No oral cyanosis.  Pulmonary: Non-labored, breath sounds are clear bilaterally, No wheezing, rales or rhonchi  Cardiovascular: S1 and S2, regular rate and rhythm  Gastrointestinal: Bowel Sounds present, soft, nontender.   Lymph: No peripheral edema. No cervical lymphadenopathy.  Neurological: Alert, no focal deficits; + mild tremor  Skin: No rashes.  Psych:  Mood & affect appropriate    LABS:                     9.7    17.43 )-----------( 180      ( 20 Oct 2022 09:00 )             30.9     138    |  104    |  13     ----------------------------<  146    4.1     |  29     |  0.70     ECG (10/20/22 @ 7:49): Normal sinus rhythm

## 2022-10-20 NOTE — PHYSICAL THERAPY INITIAL EVALUATION ADULT - DIAGNOSIS, PT EVAL
72 y.o. male s/p 3 level Cervical Laminoplasty POD 1 by Dr. Keller - C3-6 Laminoplasty from the left post cervical spine

## 2022-10-20 NOTE — PHYSICAL THERAPY INITIAL EVALUATION ADULT - GAIT TRAINING, PT EVAL
The pt will be able to ambulate more than 100 feet independently using the least restrictive assistive device by time of discharge from acute care PT program

## 2022-10-20 NOTE — PHYSICAL THERAPY INITIAL EVALUATION ADULT - LEVEL OF CONSCIOUSNESS, REHAB EVAL
intermittent confusion/ flat affect, withdrawn presentation, requiring several verbal reminders in order to complete tasks or to answer questions

## 2022-10-20 NOTE — CONSULT NOTE ADULT - NS ATTEND AMEND GEN_ALL_CORE FT
Pt seen and examined. DW NP Rosi Gardner. Agree with documentation as above with changes made where appropriate.   72M, pmh of HTN, HLD, MI X 2, multiple stents, /CABG X 3 april 2022, DMII, BPH, thyroid ca s/p resection, PUD s/p gastric bypass, Depression, Spinal stenosis who is admitted for cervical laminoplasty.   Post op has been hypertensive. Hospitalist service consulted for med comanagement.   -losartan resumed with improvement in bp.   -continue bb  -continue to hold hctz for today, re-eval in am.   -pain control.   -cardiology following.  -resume antiplatelet when ok with neurosurgery.   -continue statin.   -continue lantus/SS  -start premeal insulin if sugars start to rise.  -change diet to consistent carb/dash.   -physical therapy   -bowel regimen.   -dvt px per primary team.     thanks, will follow.

## 2022-10-21 LAB
ALBUMIN SERPL ELPH-MCNC: 2.5 G/DL — LOW (ref 3.3–5)
ALP SERPL-CCNC: 43 U/L — SIGNIFICANT CHANGE UP (ref 40–120)
ALT FLD-CCNC: 15 U/L — SIGNIFICANT CHANGE UP (ref 12–78)
ANION GAP SERPL CALC-SCNC: 4 MMOL/L — LOW (ref 5–17)
AST SERPL-CCNC: 14 U/L — LOW (ref 15–37)
BILIRUB SERPL-MCNC: 0.6 MG/DL — SIGNIFICANT CHANGE UP (ref 0.2–1.2)
BUN SERPL-MCNC: 12 MG/DL — SIGNIFICANT CHANGE UP (ref 7–23)
CALCIUM SERPL-MCNC: 8.9 MG/DL — SIGNIFICANT CHANGE UP (ref 8.5–10.1)
CHLORIDE SERPL-SCNC: 104 MMOL/L — SIGNIFICANT CHANGE UP (ref 96–108)
CO2 SERPL-SCNC: 29 MMOL/L — SIGNIFICANT CHANGE UP (ref 22–31)
CREAT SERPL-MCNC: 0.62 MG/DL — SIGNIFICANT CHANGE UP (ref 0.5–1.3)
EGFR: 102 ML/MIN/1.73M2 — SIGNIFICANT CHANGE UP
GLUCOSE SERPL-MCNC: 107 MG/DL — HIGH (ref 70–99)
HCT VFR BLD CALC: 26.5 % — LOW (ref 39–50)
HGB BLD-MCNC: 8 G/DL — LOW (ref 13–17)
MCHC RBC-ENTMCNC: 22 PG — LOW (ref 27–34)
MCHC RBC-ENTMCNC: 30.2 GM/DL — LOW (ref 32–36)
MCV RBC AUTO: 73 FL — LOW (ref 80–100)
PLATELET # BLD AUTO: 140 K/UL — LOW (ref 150–400)
POTASSIUM SERPL-MCNC: 3.7 MMOL/L — SIGNIFICANT CHANGE UP (ref 3.5–5.3)
POTASSIUM SERPL-SCNC: 3.7 MMOL/L — SIGNIFICANT CHANGE UP (ref 3.5–5.3)
PROT SERPL-MCNC: 5.5 GM/DL — LOW (ref 6–8.3)
RBC # BLD: 3.63 M/UL — LOW (ref 4.2–5.8)
RBC # FLD: 20.8 % — HIGH (ref 10.3–14.5)
SODIUM SERPL-SCNC: 137 MMOL/L — SIGNIFICANT CHANGE UP (ref 135–145)
WBC # BLD: 11.81 K/UL — HIGH (ref 3.8–10.5)
WBC # FLD AUTO: 11.81 K/UL — HIGH (ref 3.8–10.5)

## 2022-10-21 PROCEDURE — 99233 SBSQ HOSP IP/OBS HIGH 50: CPT

## 2022-10-21 PROCEDURE — 99232 SBSQ HOSP IP/OBS MODERATE 35: CPT

## 2022-10-21 RX ORDER — OXYCODONE HYDROCHLORIDE 5 MG/1
5 TABLET ORAL EVERY 4 HOURS
Refills: 0 | Status: DISCONTINUED | OUTPATIENT
Start: 2022-10-21 | End: 2022-10-24

## 2022-10-21 RX ORDER — KETOROLAC TROMETHAMINE 30 MG/ML
30 SYRINGE (ML) INJECTION EVERY 6 HOURS
Refills: 0 | Status: DISCONTINUED | OUTPATIENT
Start: 2022-10-21 | End: 2022-10-24

## 2022-10-21 RX ORDER — ASPIRIN/CALCIUM CARB/MAGNESIUM 324 MG
81 TABLET ORAL DAILY
Refills: 0 | Status: DISCONTINUED | OUTPATIENT
Start: 2022-10-21 | End: 2022-10-25

## 2022-10-21 RX ORDER — KETOROLAC TROMETHAMINE 30 MG/ML
30 SYRINGE (ML) INJECTION EVERY 6 HOURS
Refills: 0 | Status: DISCONTINUED | OUTPATIENT
Start: 2022-10-21 | End: 2022-10-21

## 2022-10-21 RX ORDER — HYDROMORPHONE HYDROCHLORIDE 2 MG/ML
0.5 INJECTION INTRAMUSCULAR; INTRAVENOUS; SUBCUTANEOUS ONCE
Refills: 0 | Status: DISCONTINUED | OUTPATIENT
Start: 2022-10-21 | End: 2022-10-21

## 2022-10-21 RX ORDER — OXYCODONE HYDROCHLORIDE 5 MG/1
2.5 TABLET ORAL ONCE
Refills: 0 | Status: DISCONTINUED | OUTPATIENT
Start: 2022-10-21 | End: 2022-10-21

## 2022-10-21 RX ORDER — METHOCARBAMOL 500 MG/1
750 TABLET, FILM COATED ORAL EVERY 8 HOURS
Refills: 0 | Status: DISCONTINUED | OUTPATIENT
Start: 2022-10-21 | End: 2022-10-24

## 2022-10-21 RX ORDER — OXYCODONE HYDROCHLORIDE 5 MG/1
2.5 TABLET ORAL EVERY 4 HOURS
Refills: 0 | Status: DISCONTINUED | OUTPATIENT
Start: 2022-10-21 | End: 2022-10-21

## 2022-10-21 RX ADMIN — OXYCODONE HYDROCHLORIDE 2.5 MILLIGRAM(S): 5 TABLET ORAL at 16:50

## 2022-10-21 RX ADMIN — Medication 81 MILLIGRAM(S): at 09:44

## 2022-10-21 RX ADMIN — Medication 30 MILLIGRAM(S): at 12:15

## 2022-10-21 RX ADMIN — OXYCODONE HYDROCHLORIDE 2.5 MILLIGRAM(S): 5 TABLET ORAL at 10:13

## 2022-10-21 RX ADMIN — Medication 1000 MILLIGRAM(S): at 03:45

## 2022-10-21 RX ADMIN — Medication 30 MILLIGRAM(S): at 18:08

## 2022-10-21 RX ADMIN — ATORVASTATIN CALCIUM 40 MILLIGRAM(S): 80 TABLET, FILM COATED ORAL at 21:50

## 2022-10-21 RX ADMIN — OXYCODONE HYDROCHLORIDE 2.5 MILLIGRAM(S): 5 TABLET ORAL at 09:43

## 2022-10-21 RX ADMIN — Medication 137 MICROGRAM(S): at 06:37

## 2022-10-21 RX ADMIN — OXYCODONE HYDROCHLORIDE 2.5 MILLIGRAM(S): 5 TABLET ORAL at 17:12

## 2022-10-21 RX ADMIN — Medication 30 MILLIGRAM(S): at 11:57

## 2022-10-21 RX ADMIN — SENNA PLUS 2 TABLET(S): 8.6 TABLET ORAL at 21:50

## 2022-10-21 RX ADMIN — HEPARIN SODIUM 5000 UNIT(S): 5000 INJECTION INTRAVENOUS; SUBCUTANEOUS at 09:38

## 2022-10-21 RX ADMIN — METHOCARBAMOL 750 MILLIGRAM(S): 500 TABLET, FILM COATED ORAL at 16:22

## 2022-10-21 RX ADMIN — Medication 1 MILLIGRAM(S): at 09:37

## 2022-10-21 RX ADMIN — METHOCARBAMOL 750 MILLIGRAM(S): 500 TABLET, FILM COATED ORAL at 21:51

## 2022-10-21 RX ADMIN — FINASTERIDE 5 MILLIGRAM(S): 5 TABLET, FILM COATED ORAL at 09:38

## 2022-10-21 RX ADMIN — OXYCODONE HYDROCHLORIDE 2.5 MILLIGRAM(S): 5 TABLET ORAL at 17:16

## 2022-10-21 RX ADMIN — PANTOPRAZOLE SODIUM 40 MILLIGRAM(S): 20 TABLET, DELAYED RELEASE ORAL at 06:38

## 2022-10-21 RX ADMIN — OXYCODONE HYDROCHLORIDE 2.5 MILLIGRAM(S): 5 TABLET ORAL at 16:03

## 2022-10-21 RX ADMIN — LOSARTAN POTASSIUM 100 MILLIGRAM(S): 100 TABLET, FILM COATED ORAL at 09:37

## 2022-10-21 RX ADMIN — Medication 400 MILLIGRAM(S): at 03:30

## 2022-10-21 RX ADMIN — HYDROMORPHONE HYDROCHLORIDE 0.5 MILLIGRAM(S): 2 INJECTION INTRAMUSCULAR; INTRAVENOUS; SUBCUTANEOUS at 04:02

## 2022-10-21 RX ADMIN — Medication 30 MILLIGRAM(S): at 01:32

## 2022-10-21 RX ADMIN — INSULIN GLARGINE 15 UNIT(S): 100 INJECTION, SOLUTION SUBCUTANEOUS at 22:01

## 2022-10-21 RX ADMIN — Medication 6: at 11:16

## 2022-10-21 RX ADMIN — TAMSULOSIN HYDROCHLORIDE 0.4 MILLIGRAM(S): 0.4 CAPSULE ORAL at 21:50

## 2022-10-21 RX ADMIN — HEPARIN SODIUM 5000 UNIT(S): 5000 INJECTION INTRAVENOUS; SUBCUTANEOUS at 21:51

## 2022-10-21 RX ADMIN — Medication 30 MILLIGRAM(S): at 01:17

## 2022-10-21 RX ADMIN — METHOCARBAMOL 750 MILLIGRAM(S): 500 TABLET, FILM COATED ORAL at 04:02

## 2022-10-21 RX ADMIN — ESCITALOPRAM OXALATE 20 MILLIGRAM(S): 10 TABLET, FILM COATED ORAL at 09:38

## 2022-10-21 RX ADMIN — HYDROMORPHONE HYDROCHLORIDE 0.5 MILLIGRAM(S): 2 INJECTION INTRAMUSCULAR; INTRAVENOUS; SUBCUTANEOUS at 04:17

## 2022-10-21 NOTE — CHART NOTE - NSCHARTNOTEFT_GEN_A_CORE
Pt seen and examined overnight, pt initially laying comfortable in bed sleeping at around 1am     Pt re-evaluated after RN called stating pt is up c/o pain, pt was seen and examined, pt was sitting at the edge of the bed in obvious discomfort. Pt c/o burning like pain going into shoulders.   Dressing noted to be stained- steri strips intact, no active bleeding or drainage, no erythema, dressing changed.   pt is awake and alert and oriented appropriately, MARIANO x4 antigravity strong thru out   Pt getting IV tylenol, will add Robaxin 750mg q8 hrs and Dilaudid 0.5mg SQ   SBP stable     WIll continue to monitor pt closely

## 2022-10-21 NOTE — PROGRESS NOTE ADULT - SUBJECTIVE AND OBJECTIVE BOX
PCP:  Dr Beka Abarca    CHIEF COMPLAINT: cervical spine myelopathy    HISTORY OF THE PRESENT ILLNESS:  this is a 71 yo male with pmh: bell's palsy, BPH, hyperparathyroidism, OA, CAD, MI x 2 ( 1983, 4/2022) recent CABG x 3 in April, 2022, has h/o multiple stents and balloon angioplasties, DM type 2, HLD, HTN, thyroid Ca, S/P thyroidectomy, s/p sepsis from cholecystitis, S/P cholecystectomy, PUD, depression and spinal stenosis with cc of numbness to left arm and hand, Now on 2 north S/P planned C3-7 laminoplasty. Wife present,  pt is sleeping but awakens to his name. answers questions appropriately.  States neck pain is an "8" but not using his PCA and falls back to sleep during our conversation.  Recent b/p 159/63.  denies any cp or sob.  We are consulted for medical management    PAST MEDICAL HISTORY: as above    PAST SURGICAL HISTORY: thyroidectomy, CABG x 3, multi cardiac PTCA with stent implants and balloon angioplasties, hernia repair, colonoscopy, wil, gastric bypass    FAMILY HISTORY:   father: dec: larynx Ca and MI    SOCIAL HISTORY:  former smoker, 2ppd x 10 years, quit in 1973,pt admits to 1-2 drinks/2-4 times a month, wife states pt drinks daily, denies rec drugs    ALLERGIES: NKDA.  Intolerances:  levofloxacin----> joint pain,  ACE-I----->cough    HOME MEDS: see med rec    10/21/22- up in a chair, has some neck pain but its improving    REVIEW OF SYSTEMS:   All 10 systems reviewed in detailed and found to be negative with the exception of what has already been described above    Vital Signs Last 24 Hrs  T(C): 36.9 (21 Oct 2022 08:50), Max: 36.9 (21 Oct 2022 08:50)  T(F): 98.4 (21 Oct 2022 08:50), Max: 98.4 (21 Oct 2022 08:50)  HR: 66 (21 Oct 2022 08:50) (66 - 77)  BP: 130/57 (21 Oct 2022 08:50) (130/57 - 184/66)  BP(mean): 98 (20 Oct 2022 20:05) (98 - 98)  RR: 17 (21 Oct 2022 08:50) (17 - 17)  SpO2: 98% (21 Oct 2022 08:50) (93% - 98%)    Parameters below as of 21 Oct 2022 08:50  Patient On (Oxygen Delivery Method): room air    PHYSICAL EXAM:  GENERAL: Comfortable, no acute distress   HEAD:  Normocephalic, atraumatic  EYES: EOMI, PERRLA  HEENT: Moist mucous membranes  NECK: Supple, No JVD  NERVOUS SYSTEM:  Alert & Oriented X3, Motor Strength 5/5 B/L upper and lower extremities  CHEST/LUNG: Clear to auscultation bilaterally  HEART: Regular rate and rhythm  ABDOMEN: Soft, non tender, Nondistended, Bowel sounds present  GENITOURINARY: Voiding, no palpable bladder  EXTREMITIES:   No clubbing, cyanosis, or edema  MUSCULOSKELETAL- c spine posterior neck dsg c/d/i  SKIN-no rash      LABS:                        8.0    11.81 )-----------( 140      ( 21 Oct 2022 08:37 )             26.5     21 Oct 2022 08:37    137    |  104    |  12     ----------------------------<  107    3.7     |  29     |  0.62     Ca    8.9        21 Oct 2022 08:37  Phos  2.0       20 Oct 2022 09:00  Mg     2.1       20 Oct 2022 09:00    TPro  5.5    /  Alb  2.5    /  TBili  0.6    /  DBili  0.2    /  AST  14     /  ALT  15     /  AlkPhos  43     21 Oct 2022 08:37    LIVER FUNCTIONS - ( 21 Oct 2022 08:37 )  Alb: 2.5 g/dL / Pro: 5.5 gm/dL / ALK PHOS: 43 U/L / ALT: 15 U/L / AST: 14 U/L / GGT: x           CAPILLARY BLOOD GLUCOSE  POCT Blood Glucose.: 285 mg/dL (21 Oct 2022 11:13)  POCT Blood Glucose.: 116 mg/dL (21 Oct 2022 08:54)  POCT Blood Glucose.: 143 mg/dL (20 Oct 2022 21:50)    MEDICATIONS  (STANDING):  aspirin  chewable 81 milliGRAM(s) Oral daily  atorvastatin 40 milliGRAM(s) Oral at bedtime  benzocaine 15 mG/menthol 3.6 mG Lozenge 1 Lozenge Oral every 3 hours  escitalopram 20 milliGRAM(s) Oral daily  finasteride 5 milliGRAM(s) Oral daily  folic acid 1 milliGRAM(s) Oral daily  glucagon  Injectable 1 milliGRAM(s) IntraMuscular once  heparin   Injectable 5000 Unit(s) SubCutaneous every 12 hours  insulin glargine Injectable (LANTUS) 15 Unit(s) SubCutaneous at bedtime  insulin lispro (ADMELOG) corrective regimen sliding scale   SubCutaneous three times a day before meals  insulin lispro (ADMELOG) corrective regimen sliding scale   SubCutaneous at bedtime  levothyroxine 137 MICROGram(s) Oral daily  losartan 100 milliGRAM(s) Oral daily  methocarbamol 750 milliGRAM(s) Oral every 8 hours  multivitamin 1 Tablet(s) Oral daily  pantoprazole    Tablet 40 milliGRAM(s) Oral before breakfast  polyethylene glycol 3350 17 Gram(s) Oral daily  propranolol 80 milliGRAM(s) Oral every 12 hours  senna 2 Tablet(s) Oral at bedtime  tamsulosin 0.4 milliGRAM(s) Oral at bedtime    MEDICATIONS  (PRN):  bisacodyl 5 milliGRAM(s) Oral every 12 hours PRN Constipation  dextrose Oral Gel 15 Gram(s) Oral once PRN Blood Glucose LESS THAN 70 milliGRAM(s)/deciliter  famotidine    Tablet 20 milliGRAM(s) Oral every 12 hours PRN Dyspepsia  hydrALAZINE 10 milliGRAM(s) Oral every 4 hours PRN Systolic blood pressure >160  ketorolac   Injectable 30 milliGRAM(s) IV Push every 6 hours PRN Severe Pain (7 - 10)  naloxone Injectable 0.1 milliGRAM(s) IV Push every 3 minutes PRN For ANY of the following changes in patient status:  A. RR LESS THAN 10 breaths per minute, B. Oxygen saturation LESS THAN 90%, C. Sedation score of 6  ondansetron Injectable 4 milliGRAM(s) IV Push every 6 hours PRN Nausea and/or Vomiting  oxyCODONE    IR 2.5 milliGRAM(s) Oral every 4 hours PRN Moderate Pain (4 - 6)      IMPRESSION:  71 yo male with above pmh a/w:    # cervical spine stenosis/myelopathy  # S/P cervical laminoplasty, 3 levels  Spine following  Pain meds prn  Muscle relaxants prn  UP and ambulated with PT  Incentive spiroemtry    #leukocytosis  likely reactive  R/T surgery  afebrile, no s/s of infn  improving    # Iron deficiency anemia with acute blood loss anemia related to surgery  -monitor h/h  -last upper endo 2012? last colonoscopy 2019 with 1 benign polyp removed  -se[facility] Hem[facility] Dr Bauer [facility]  -pt receiving iron infusions    #CAD with h/o CABG x 3, multi stents  last stent 2011  resume ASA when ok with surgery  cont statin    #HTN  Improved  Cont losartan, propranolol    #depression  cont lexapro    #hypothyroidism  cont levothyroxine    #DM type 2  controlled  A1C 6.2  uses freestyle [place] but removed it for surgery  wife to bring in to avoid multi fingersticks  hold OHAS  BGm/ss  Lantus    # H/O GERD/PUD  PPI    #BPH  resume finesteride/tamsulosin    #VTE prophylaxis  hep sq  venodynes  amb with PT    #Dispo- per NS team, likely home tomorrow if continues to improve

## 2022-10-21 NOTE — PROGRESS NOTE ADULT - ASSESSMENT
72 year old male POD#2 C3-6 laminoplasty   IV tylenol/oxy IR 2.5mg/ toradol for pain  Robaxin for spasm  bowel regime  heparin SQ for DVT prophylaxis  Cardiology & Hospitalist following  OOB/PT   d/w Dr Keller   72 year old male POD#2 C3-6 laminoplasty   IV tylenol/oxy IR 2.5mg/ toradol for pain  Robaxin for spasm  bowel regime  heparin SQ for DVT prophylaxis  Cardiology & Hospitalist following   restarted asa, may restart plavix in 5-7 days  OOB/PT   d/w Dr Keller

## 2022-10-21 NOTE — PROGRESS NOTE ADULT - SUBJECTIVE AND OBJECTIVE BOX
CHIEF COMPLAINT: Patient is a 72y old  Male who presents with a chief complaint of severe back pain    HPI: 72 year old man with a cardiac history significant for severe CAD, MI, remote coronary stents, recent CABG x 3 (4/2022), HTN, HLD, normal LV function, who was admitted on 10/19/22 for elective C3-6 Laminoplasty from the left post cervical spine.  I was called to assist with poorly-controlled HTN.  He describes previously good BP control (also noted in pre-op note by his outpatient cardiologist) and says that he has been doing well from a cardiac standpoint in recent months.  He denies angina.  He describes severe 9 out of 10 post-op spine pain - relief with pain meds; worsens with movement. He presently has no cardiac symptoms.  10/21/22 Seated in bedside chair feeling well pain currently controlled blood pressure improved       PAST MEDICAL & SURGICAL HISTORY:  Type 2 diabetes mellitus  HTN (hypertension)  HLD (hyperlipidemia)  Thyroid cancer 2018  CAD (coronary artery disease)  Myocardial infarction 1983, 04/2022 S/P CABG  Spinal stenosis cervical  OA (osteoarthritis) of knee  Depression, major  H/O cholecystitis  BPH (benign prostatic hyperplasia)  ASHD (arteriosclerotic heart disease)  Bell's palsy  Proteinuria  Erectile dysfunction  OA (osteoarthritis)  S/P colonoscopy lymphoid polyp 2009  AI (aortic insufficiency)  Other specified sepsis 2014 gram negative sepsis  Bile duct stone removed, sphincter of Oddi dysfunction  Skin cancer of head - removed - non melanoma  Hyperparathyroidism  Anxiety and depression  Hypothyroidism  S/P gastric bypass complicated by PUD 2010  H/O thyroidectomy  S/P cholecystectomy  S/P hernia repair  S/P CABG x 3 04/2022 at [facility]  S/P PTCA (percutaneous transluminal coronary angioplasty) 08/2001, 03/2001, 08/2000, 07/2000, 06/2000 total of 8 stents prior to CABG in 04/2022  S/P colonoscopy    SOCIAL HISTORY:   Alcohol: patient says he drinks "a lot" and nearly every day  Smoking: Past smoker    FAMILY HISTORY:   Myocardial infarction (Father)  Cancer of larynx (Father)    MEDICATIONS  (STANDING):  aspirin  chewable 81 milliGRAM(s) Oral daily  atorvastatin 40 milliGRAM(s) Oral at bedtime  benzocaine 15 mG/menthol 3.6 mG Lozenge 1 Lozenge Oral every 3 hours  escitalopram 20 milliGRAM(s) Oral daily  finasteride 5 milliGRAM(s) Oral daily  folic acid 1 milliGRAM(s) Oral daily  glucagon  Injectable 1 milliGRAM(s) IntraMuscular once  heparin   Injectable 5000 Unit(s) SubCutaneous every 12 hours  insulin glargine Injectable (LANTUS) 15 Unit(s) SubCutaneous at bedtime  insulin lispro (ADMELOG) corrective regimen sliding scale   SubCutaneous three times a day before meals  insulin lispro (ADMELOG) corrective regimen sliding scale   SubCutaneous at bedtime  levothyroxine 137 MICROGram(s) Oral daily  losartan 100 milliGRAM(s) Oral daily  methocarbamol 750 milliGRAM(s) Oral every 8 hours  multivitamin 1 Tablet(s) Oral daily  pantoprazole    Tablet 40 milliGRAM(s) Oral before breakfast  polyethylene glycol 3350 17 Gram(s) Oral daily  propranolol 80 milliGRAM(s) Oral every 12 hours  senna 2 Tablet(s) Oral at bedtime  tamsulosin 0.4 milliGRAM(s) Oral at bedtime    MEDICATIONS  (PRN):  bisacodyl 5 milliGRAM(s) Oral every 12 hours PRN Constipation  dextrose Oral Gel 15 Gram(s) Oral once PRN Blood Glucose LESS THAN 70 milliGRAM(s)/deciliter  famotidine    Tablet 20 milliGRAM(s) Oral every 12 hours PRN Dyspepsia  hydrALAZINE 10 milliGRAM(s) Oral every 4 hours PRN Systolic blood pressure >160  ketorolac   Injectable 30 milliGRAM(s) IV Push every 6 hours PRN Severe Pain (7 - 10)  naloxone Injectable 0.1 milliGRAM(s) IV Push every 3 minutes PRN For ANY of the following changes in patient status:  A. RR LESS THAN 10 breaths per minute, B. Oxygen saturation LESS THAN 90%, C. Sedation score of 6  ondansetron Injectable 4 milliGRAM(s) IV Push every 6 hours PRN Nausea and/or Vomiting  oxyCODONE    IR 2.5 milliGRAM(s) Oral every 4 hours PRN Moderate Pain (4 - 6)        Allergies: No Known Allergies    Intolerances:  ACE inhibitors (Other)  levofloxacin (Joint Pain)    Vital Signs Last 24 Hrs  T(C): 36.9 (21 Oct 2022 08:50), Max: 36.9 (21 Oct 2022 08:50)  T(F): 98.4 (21 Oct 2022 08:50), Max: 98.4 (21 Oct 2022 08:50)  HR: 66 (21 Oct 2022 08:50) (66 - 77)  BP: 130/57 (21 Oct 2022 08:50) (130/57 - 184/66)  BP(mean): 98 (20 Oct 2022 20:05) (98 - 98)  RR: 17 (21 Oct 2022 08:50) (17 - 17)  SpO2: 98% (21 Oct 2022 08:50) (93% - 98%)    Parameters below as of 21 Oct 2022 08:50  Patient On (Oxygen Delivery Method): room air        PHYSICAL EXAM:  Constitutional: NAD, awake and alert, seated in bedside chair  HEENT:  EOMI,  Pupils round, No oral cyanosis.  Pulmonary: Non-labored, breath sounds are clear bilaterally  Cardiovascular: S1 and S2, RRR  Gastrointestinal: Abd soft, nontender.   Lymph: No peripheral edema. No cervical lymphadenopathy.  Neurological: Alert, no focal deficits; + mild tremor  Skin: No rashes.  Psych:  Mood & affect appropriate    LABS:                     9.7    17.43 )-----------( 180      ( 20 Oct 2022 09:00 )             30.9     138    |  104    |  13     ----------------------------<  146    4.1     |  29     |  0.70     ECG (10/20/22 @ 7:49): Normal sinus rhythm      Summary:   1. Left ventricular ejection fraction, by visual estimation, is >75%.   2. Hyperdynamic global left ventricular systolic function.   3. The left ventricular diastolic function could not be assessed in this   study.   4. Mild mitral annular calcification.   5. Mild thickening of the anterior and posterior mitral valve leaflets.   6. Trace mitral valve regurgitation.  7. Mild aortic regurgitation.   8. Adequate TR velocity was not obtained to accurately assess RVSP.    Jim Vega MD Electronically signed on 4/14/2022 at 3:30:36 PM

## 2022-10-21 NOTE — PROGRESS NOTE ADULT - SUBJECTIVE AND OBJECTIVE BOX
72 year old man with a cardiac history significant for severe CAD, MI, remote coronary stents, recent CABG x 3 (4/2022), HTN, HLD, normal LV function, who was admitted on 10/19/22 for elective C3-6 Laminoplasty from the left post cervical spine. Postoperative had increased blood pressure over night due to pain    10/20 POD#1 C3-6 Laminoplasty, cardiology consulted for blood pressure management, PCA ordered, voiding spontaneously overnight after some retention (figueroa never placed) Hospitalist also consulted to follow along    10/21 POD#2 C3-6 Laminoplasty, feels much better this morning, AOx3, states he had muscle spasm relief with robaxin overnight    ICU Vital Signs Last 24 Hrs  T(C): 36.6 (21 Oct 2022 00:47), Max: 36.8 (20 Oct 2022 14:41)  T(F): 97.9 (21 Oct 2022 00:47), Max: 98.2 (20 Oct 2022 14:41)  HR: 71 (21 Oct 2022 00:47) (63 - 77)  BP: 154/62 (21 Oct 2022 00:47) (154/62 - 184/66)  BP(mean): 98 (20 Oct 2022 20:05) (98 - 98)  ABP: --  ABP(mean): --  RR: 17 (21 Oct 2022 00:47) (17 - 17)  SpO2: 93% (21 Oct 2022 00:47) (93% - 99%)    O2 Parameters below as of 21 Oct 2022 00:47  Patient On (Oxygen Delivery Method): room air        MEDICATIONS  (STANDING):  atorvastatin 40 milliGRAM(s) Oral at bedtime  benzocaine 15 mG/menthol 3.6 mG Lozenge 1 Lozenge Oral every 3 hours  escitalopram 20 milliGRAM(s) Oral daily  finasteride 5 milliGRAM(s) Oral daily  folic acid 1 milliGRAM(s) Oral daily  glucagon  Injectable 1 milliGRAM(s) IntraMuscular once  heparin   Injectable 5000 Unit(s) SubCutaneous every 12 hours  insulin glargine Injectable (LANTUS) 15 Unit(s) SubCutaneous at bedtime  insulin lispro (ADMELOG) corrective regimen sliding scale   SubCutaneous three times a day before meals  insulin lispro (ADMELOG) corrective regimen sliding scale   SubCutaneous at bedtime  levothyroxine 137 MICROGram(s) Oral daily  losartan 100 milliGRAM(s) Oral daily  methocarbamol 750 milliGRAM(s) Oral every 8 hours  multivitamin 1 Tablet(s) Oral daily  pantoprazole    Tablet 40 milliGRAM(s) Oral before breakfast  polyethylene glycol 3350 17 Gram(s) Oral daily  propranolol 80 milliGRAM(s) Oral every 12 hours  senna 2 Tablet(s) Oral at bedtime  tamsulosin 0.4 milliGRAM(s) Oral at bedtime    MEDICATIONS  (PRN):  dextrose Oral Gel 15 Gram(s) Oral once PRN Blood Glucose LESS THAN 70 milliGRAM(s)/deciliter  famotidine    Tablet 20 milliGRAM(s) Oral every 12 hours PRN Dyspepsia  hydrALAZINE 10 milliGRAM(s) Oral every 4 hours PRN Systolic blood pressure >160  ketorolac   Injectable 30 milliGRAM(s) IV Push every 6 hours PRN Moderate Pain (4 - 6)  naloxone Injectable 0.1 milliGRAM(s) IV Push every 3 minutes PRN For ANY of the following changes in patient status:  A. RR LESS THAN 10 breaths per minute, B. Oxygen saturation LESS THAN 90%, C. Sedation score of 6  ondansetron Injectable 4 milliGRAM(s) IV Push every 6 hours PRN Nausea and/or Vomiting  oxyCODONE    IR 2.5 milliGRAM(s) Oral every 4 hours PRN Moderate Pain (4 - 6)              ROS: denies chest pain, SOB, palpitations, nausea, vomiting, weakness or numbness        PHYSICAL EXAM:  Constitutional: sleeping but arousable, [id number] commands  HEENT:  Pupils reactive, sclera clear, EOMI, no nystagmus  Pulmonary: Non-labored, breath sounds are clear bilaterally, No wheezing, rales or rhonchi  Cardiovascular: S1 and S2, regular rate and rhythm  Gastrointestinal: Bowel Sounds present, soft, nontender.   Lymph: No peripheral edema. No cervical lymphadenopathy.  Neurological: Alert, no focal deficits; + mild tremor  Skin: No rashes.    Neurologic  Exam:    AOx3, Follows commands, states he slept last night and had some pain relief  Speech clear & fluent, tongue midline, mild L facial (hx of [place] palsy  Pupils reactive bilat, EOMI, no nystagmus  dressing changed this morning, intact without drainage  5/5 throughout, sensation grossly intact  bilat LE clonus       72 year old man with a cardiac history significant for severe CAD, MI, remote coronary stents, recent CABG x 3 (4/2022), HTN, HLD, normal LV function, who was admitted on 10/19/22 for elective C3-6 Laminoplasty from the left post cervical spine. Postoperative had increased blood pressure over night due to pain    10/20 POD#1 C3-6 Laminoplasty, cardiology consulted for blood pressure management, PCA ordered, voiding spontaneously overnight after some retention (figueroa never placed) Hospitalist also consulted to follow along    10/21 POD#2 C3-6 Laminoplasty, feels much better this morning, AOx3, states he had muscle spasm relief with robaxin overnight    ICU Vital Signs Last 24 Hrs  T(C): 36.6 (21 Oct 2022 00:47), Max: 36.8 (20 Oct 2022 14:41)  T(F): 97.9 (21 Oct 2022 00:47), Max: 98.2 (20 Oct 2022 14:41)  HR: 71 (21 Oct 2022 00:47) (63 - 77)  BP: 154/62 (21 Oct 2022 00:47) (154/62 - 184/66)  BP(mean): 98 (20 Oct 2022 20:05) (98 - 98)  ABP: --  ABP(mean): --  RR: 17 (21 Oct 2022 00:47) (17 - 17)  SpO2: 93% (21 Oct 2022 00:47) (93% - 99%)    O2 Parameters below as of 21 Oct 2022 00:47  Patient On (Oxygen Delivery Method): room air                          8.0    11.81 )-----------( 140      ( 21 Oct 2022 08:37 )               26.5   10-20    138  |  104  |  13  ----------------------------<  146<H>  4.1   |  29  |  0.70    Ca    9.4      20 Oct 2022 09:00  Phos  2.0     10-20  Mg     2.1     10-20    TPro  6.6  /  Alb  3.3  /  TBili  0.5  /  DBili  x   /  AST  20  /  ALT  19  /  AlkPhos  51  10-20          MEDICATIONS  (STANDING):  atorvastatin 40 milliGRAM(s) Oral at bedtime  benzocaine 15 mG/menthol 3.6 mG Lozenge 1 Lozenge Oral every 3 hours  escitalopram 20 milliGRAM(s) Oral daily  finasteride 5 milliGRAM(s) Oral daily  folic acid 1 milliGRAM(s) Oral daily  glucagon  Injectable 1 milliGRAM(s) IntraMuscular once  heparin   Injectable 5000 Unit(s) SubCutaneous every 12 hours  insulin glargine Injectable (LANTUS) 15 Unit(s) SubCutaneous at bedtime  insulin lispro (ADMELOG) corrective regimen sliding scale   SubCutaneous three times a day before meals  insulin lispro (ADMELOG) corrective regimen sliding scale   SubCutaneous at bedtime  levothyroxine 137 MICROGram(s) Oral daily  losartan 100 milliGRAM(s) Oral daily  methocarbamol 750 milliGRAM(s) Oral every 8 hours  multivitamin 1 Tablet(s) Oral daily  pantoprazole    Tablet 40 milliGRAM(s) Oral before breakfast  polyethylene glycol 3350 17 Gram(s) Oral daily  propranolol 80 milliGRAM(s) Oral every 12 hours  senna 2 Tablet(s) Oral at bedtime  tamsulosin 0.4 milliGRAM(s) Oral at bedtime    MEDICATIONS  (PRN):  dextrose Oral Gel 15 Gram(s) Oral once PRN Blood Glucose LESS THAN 70 milliGRAM(s)/deciliter  famotidine    Tablet 20 milliGRAM(s) Oral every 12 hours PRN Dyspepsia  hydrALAZINE 10 milliGRAM(s) Oral every 4 hours PRN Systolic blood pressure >160  ketorolac   Injectable 30 milliGRAM(s) IV Push every 6 hours PRN Moderate Pain (4 - 6)  naloxone Injectable 0.1 milliGRAM(s) IV Push every 3 minutes PRN For ANY of the following changes in patient status:  A. RR LESS THAN 10 breaths per minute, B. Oxygen saturation LESS THAN 90%, C. Sedation score of 6  ondansetron Injectable 4 milliGRAM(s) IV Push every 6 hours PRN Nausea and/or Vomiting  oxyCODONE    IR 2.5 milliGRAM(s) Oral every 4 hours PRN Moderate Pain (4 - 6)              ROS: denies chest pain, SOB, palpitations, nausea, vomiting, weakness or numbness        PHYSICAL EXAM:  Constitutional: sleeping but arousable, [id number] commands  HEENT:  Pupils reactive, sclera clear, EOMI, no nystagmus  Pulmonary: Non-labored, breath sounds are clear bilaterally, No wheezing, rales or rhonchi  Cardiovascular: S1 and S2, regular rate and rhythm  Gastrointestinal: Bowel Sounds present, soft, nontender.   Lymph: No peripheral edema. No cervical lymphadenopathy.  Neurological: Alert, no focal deficits; + mild tremor  Skin: No rashes.    Neurologic  Exam:    AOx3, Follows commands, states he slept last night and had some pain relief  Speech clear & fluent, tongue midline, mild L facial (hx of [place] palsy  Pupils reactive bilat, EOMI, no nystagmus  dressing changed this morning, intact without drainage  5/5 throughout, sensation grossly intact  bilat LE clonus

## 2022-10-21 NOTE — PROGRESS NOTE ADULT - PROBLEM/PLAN-2
Daily Note     Today's date: 2021  Patient name: Emil Alaniz  : 1988  MRN: [id number]  Referring provider: Jimbo Briceño MD  Dx:   Encounter Diagnosis     ICD-10-CM    1  Sciatica, right side  M54 31        Start Time: 8178  Stop Time:   Total time in clinic (min): 41 minutes    Subjective: Pt states symptoms continue to improve  He states pain is currently "maybe a 3/10 "      Objective: See treatment diary below      Assessment: Tolerated treatment well  Progression to functional lifting anticipated next session for improved strength and  Improved functional independence  Patient demonstrated fatigue post treatment, exhibited good technique with therapeutic exercises and would benefit from continued PT      Plan: Continue per plan of care  Progress treatment as tolerated         Precautions: universal      Manuals       Jt Mob-Pa mob             STM  L/S R,   L/S R L/S R L/S R L/S R-pt educated for tennis ball STM of  L/S R                                Neuro Re-Ed             TAB  5" 20x 5" 20x 5" 20x         TAB ADD  5" 15x 5" 15x 5" 15x 5" 15x        TAB ER  5" 20x BTB 5" 20x BTB 5" 20x BTB 5" 20x BTB 5" 20x BTB 5" 20x BTB      Superman Progression     Unilateral progression 5x hold       Clamshell      20x 20x      Palloff Press       BTB 20x ea      TAB Sled Push       NV      TAB box Lift       NV      Ther Ex             Prone lying  3' 3x            Prone Prop unable 3'            Prone Press Up  20x 5" 20x 5" w/ OP 20x 5" w/ OP 20x 20x 20x      Prone Hip ext  10x   20x 20x 20x      LTR  5" 10xea 5" 10xea 5" 10xea HEP        Prone on elbows   3' 2' HEP        Supine HSS   30" 3x 30" 3x 30" 3x 30" 3x 30" 3x      Piriformis Stretch   30" 3x 30" 3x 30" 3x 30" 3x 30" 3x      Standing Ext w/ OP     20x 20x 20x      Ther Activity                                       Gait Training                                       Modalities DISPLAY PLAN FREE TEXT

## 2022-10-21 NOTE — PROGRESS NOTE ADULT - PROBLEM SELECTOR PLAN 2
No active cardiac complaints   ·S/p CABG in April 2022 stable; No active cardiac complaints   ·S/p CABG in April 2022 stable  Will follow PRN

## 2022-10-22 LAB
ANION GAP SERPL CALC-SCNC: 4 MMOL/L — LOW (ref 5–17)
BUN SERPL-MCNC: 13 MG/DL — SIGNIFICANT CHANGE UP (ref 7–23)
CALCIUM SERPL-MCNC: 8.7 MG/DL — SIGNIFICANT CHANGE UP (ref 8.5–10.1)
CHLORIDE SERPL-SCNC: 105 MMOL/L — SIGNIFICANT CHANGE UP (ref 96–108)
CO2 SERPL-SCNC: 29 MMOL/L — SIGNIFICANT CHANGE UP (ref 22–31)
CREAT SERPL-MCNC: 0.68 MG/DL — SIGNIFICANT CHANGE UP (ref 0.5–1.3)
EGFR: 99 ML/MIN/1.73M2 — SIGNIFICANT CHANGE UP
GLUCOSE SERPL-MCNC: 82 MG/DL — SIGNIFICANT CHANGE UP (ref 70–99)
HCT VFR BLD CALC: 29.2 % — LOW (ref 39–50)
HGB BLD-MCNC: 8.8 G/DL — LOW (ref 13–17)
MCHC RBC-ENTMCNC: 22.2 PG — LOW (ref 27–34)
MCHC RBC-ENTMCNC: 30.1 GM/DL — LOW (ref 32–36)
MCV RBC AUTO: 73.7 FL — LOW (ref 80–100)
PLATELET # BLD AUTO: 150 K/UL — SIGNIFICANT CHANGE UP (ref 150–400)
POTASSIUM SERPL-MCNC: 3.5 MMOL/L — SIGNIFICANT CHANGE UP (ref 3.5–5.3)
POTASSIUM SERPL-SCNC: 3.5 MMOL/L — SIGNIFICANT CHANGE UP (ref 3.5–5.3)
RBC # BLD: 3.96 M/UL — LOW (ref 4.2–5.8)
RBC # FLD: 21.4 % — HIGH (ref 10.3–14.5)
SODIUM SERPL-SCNC: 138 MMOL/L — SIGNIFICANT CHANGE UP (ref 135–145)
WBC # BLD: 9.69 K/UL — SIGNIFICANT CHANGE UP (ref 3.8–10.5)
WBC # FLD AUTO: 9.69 K/UL — SIGNIFICANT CHANGE UP (ref 3.8–10.5)

## 2022-10-22 PROCEDURE — 99232 SBSQ HOSP IP/OBS MODERATE 35: CPT

## 2022-10-22 RX ORDER — OXYCODONE HYDROCHLORIDE 5 MG/1
10 TABLET ORAL EVERY 4 HOURS
Refills: 0 | Status: DISCONTINUED | OUTPATIENT
Start: 2022-10-22 | End: 2022-10-24

## 2022-10-22 RX ORDER — LACTULOSE 10 G/15ML
20 SOLUTION ORAL
Refills: 0 | Status: DISCONTINUED | OUTPATIENT
Start: 2022-10-22 | End: 2022-10-25

## 2022-10-22 RX ORDER — POLYETHYLENE GLYCOL 3350 17 G/17G
17 POWDER, FOR SOLUTION ORAL
Refills: 0 | Status: DISCONTINUED | OUTPATIENT
Start: 2022-10-22 | End: 2022-10-25

## 2022-10-22 RX ORDER — MORPHINE SULFATE 50 MG/1
2 CAPSULE, EXTENDED RELEASE ORAL EVERY 4 HOURS
Refills: 0 | Status: DISCONTINUED | OUTPATIENT
Start: 2022-10-22 | End: 2022-10-24

## 2022-10-22 RX ADMIN — INSULIN GLARGINE 15 UNIT(S): 100 INJECTION, SOLUTION SUBCUTANEOUS at 21:38

## 2022-10-22 RX ADMIN — LOSARTAN POTASSIUM 100 MILLIGRAM(S): 100 TABLET, FILM COATED ORAL at 09:24

## 2022-10-22 RX ADMIN — Medication 30 MILLIGRAM(S): at 18:54

## 2022-10-22 RX ADMIN — OXYCODONE HYDROCHLORIDE 5 MILLIGRAM(S): 5 TABLET ORAL at 08:30

## 2022-10-22 RX ADMIN — Medication 30 MILLIGRAM(S): at 01:20

## 2022-10-22 RX ADMIN — ESCITALOPRAM OXALATE 20 MILLIGRAM(S): 10 TABLET, FILM COATED ORAL at 09:25

## 2022-10-22 RX ADMIN — PANTOPRAZOLE SODIUM 40 MILLIGRAM(S): 20 TABLET, DELAYED RELEASE ORAL at 05:38

## 2022-10-22 RX ADMIN — TAMSULOSIN HYDROCHLORIDE 0.4 MILLIGRAM(S): 0.4 CAPSULE ORAL at 21:38

## 2022-10-22 RX ADMIN — LACTULOSE 20 GRAM(S): 10 SOLUTION ORAL at 21:41

## 2022-10-22 RX ADMIN — POLYETHYLENE GLYCOL 3350 17 GRAM(S): 17 POWDER, FOR SOLUTION ORAL at 09:24

## 2022-10-22 RX ADMIN — HEPARIN SODIUM 5000 UNIT(S): 5000 INJECTION INTRAVENOUS; SUBCUTANEOUS at 09:24

## 2022-10-22 RX ADMIN — MORPHINE SULFATE 2 MILLIGRAM(S): 50 CAPSULE, EXTENDED RELEASE ORAL at 12:56

## 2022-10-22 RX ADMIN — LACTULOSE 20 GRAM(S): 10 SOLUTION ORAL at 16:35

## 2022-10-22 RX ADMIN — ATORVASTATIN CALCIUM 40 MILLIGRAM(S): 80 TABLET, FILM COATED ORAL at 21:38

## 2022-10-22 RX ADMIN — Medication 30 MILLIGRAM(S): at 00:47

## 2022-10-22 RX ADMIN — OXYCODONE HYDROCHLORIDE 10 MILLIGRAM(S): 5 TABLET ORAL at 23:32

## 2022-10-22 RX ADMIN — OXYCODONE HYDROCHLORIDE 10 MILLIGRAM(S): 5 TABLET ORAL at 17:37

## 2022-10-22 RX ADMIN — Medication 81 MILLIGRAM(S): at 09:25

## 2022-10-22 RX ADMIN — OXYCODONE HYDROCHLORIDE 10 MILLIGRAM(S): 5 TABLET ORAL at 11:45

## 2022-10-22 RX ADMIN — OXYCODONE HYDROCHLORIDE 10 MILLIGRAM(S): 5 TABLET ORAL at 11:12

## 2022-10-22 RX ADMIN — METHOCARBAMOL 750 MILLIGRAM(S): 500 TABLET, FILM COATED ORAL at 13:41

## 2022-10-22 RX ADMIN — MORPHINE SULFATE 2 MILLIGRAM(S): 50 CAPSULE, EXTENDED RELEASE ORAL at 08:30

## 2022-10-22 RX ADMIN — FINASTERIDE 5 MILLIGRAM(S): 5 TABLET, FILM COATED ORAL at 09:26

## 2022-10-22 RX ADMIN — HEPARIN SODIUM 5000 UNIT(S): 5000 INJECTION INTRAVENOUS; SUBCUTANEOUS at 21:39

## 2022-10-22 RX ADMIN — Medication 1 MILLIGRAM(S): at 09:25

## 2022-10-22 RX ADMIN — METHOCARBAMOL 750 MILLIGRAM(S): 500 TABLET, FILM COATED ORAL at 05:38

## 2022-10-22 RX ADMIN — Medication 5 MILLIGRAM(S): at 09:24

## 2022-10-22 RX ADMIN — OXYCODONE HYDROCHLORIDE 10 MILLIGRAM(S): 5 TABLET ORAL at 18:07

## 2022-10-22 RX ADMIN — POLYETHYLENE GLYCOL 3350 17 GRAM(S): 17 POWDER, FOR SOLUTION ORAL at 22:27

## 2022-10-22 RX ADMIN — OXYCODONE HYDROCHLORIDE 5 MILLIGRAM(S): 5 TABLET ORAL at 08:00

## 2022-10-22 RX ADMIN — Medication 2: at 17:13

## 2022-10-22 RX ADMIN — METHOCARBAMOL 750 MILLIGRAM(S): 500 TABLET, FILM COATED ORAL at 21:39

## 2022-10-22 RX ADMIN — Medication 137 MICROGRAM(S): at 05:38

## 2022-10-22 RX ADMIN — SENNA PLUS 2 TABLET(S): 8.6 TABLET ORAL at 21:40

## 2022-10-22 RX ADMIN — Medication 30 MILLIGRAM(S): at 18:24

## 2022-10-22 RX ADMIN — Medication 30 MILLIGRAM(S): at 07:23

## 2022-10-22 RX ADMIN — Medication 1 TABLET(S): at 13:41

## 2022-10-22 RX ADMIN — Medication 30 MILLIGRAM(S): at 06:52

## 2022-10-22 NOTE — PROGRESS NOTE ADULT - SUBJECTIVE AND OBJECTIVE BOX
PCP:  Dr Beka Abarca    c/c: LUE numbness    HPI:  72M,  pmh of HTN, HLD, CAD/MI X 2, CABG april 2022, multiple cardiac stents/balloon angioplasties, DMII,  bell's palsy, BPH, OA,  thyroid Ca, S/P thyroidectomy, PUD, depression and spinal stenosis with cc of numbness to left arm and hand, Now on 2 north S/P planned C3-7 laminoplasty.   Hospitalist service consulted for medical comanagement.     pt seen and examined on 2N this am. c/o 10/10 pain. had gotten 5mg oxycodone without relief. Still with LUE parasthesias. No sob/chest pain. tolerating po. no difficulty voiding.   No bm yet.     ROS: all 10 systems reviewed and is as above otherwise negative.     Vital Signs Last 24 Hrs  T(C): 36.7 (22 Oct 2022 09:03), Max: 37 (21 Oct 2022 16:13)  T(F): 98.1 (22 Oct 2022 09:03), Max: 98.6 (21 Oct 2022 16:13)  HR: 66 (22 Oct 2022 09:03) (66 - 72)  BP: 159/58 (22 Oct 2022 09:03) (138/64 - 159/58)  RR: 17 (22 Oct 2022 09:03) (17 - 17)  SpO2: 94% (22 Oct 2022 09:03) (94% - 97%)    Parameters below as of 22 Oct 2022 09:03  Patient On (Oxygen Delivery Method): room air        PHYSICAL EXAM:  GENERAL: Comfortable, no acute distress   HEAD:  Normocephalic, atraumatic  EYES: EOMI, PERRLA  HEENT: Moist mucous membranes  NECK: Supple, No JVD  NERVOUS SYSTEM:  Alert & Oriented X3, Motor Strength 5/5 B/L upper and lower extremities  CHEST/LUNG: Clear to auscultation bilaterally  HEART: Regular rate and rhythm  ABDOMEN: Soft, non tender, distended, Bowel sounds present  GENITOURINARY: Voiding, no palpable bladder  EXTREMITIES:   No clubbing, cyanosis, or edema  MUSCULOSKELETAL- c spine posterior neck dsg c/d/i  SKIN-no rash    LABS:                        8.8    9.69  )-----------( 150      ( 22 Oct 2022 09:21 )             29.2     10-22    138  |  105  |  13  ----------------------------<  82  3.5   |  29  |  0.68    Ca    8.7      22 Oct 2022 09:21    TPro  5.5<L>  /  Alb  2.5<L>  /  TBili  0.6  /  DBili  0.2  /  AST  14<L>  /  ALT  15  /  AlkPhos  43  10-21            MEDICATIONS  (STANDING):  aspirin  chewable 81 milliGRAM(s) Oral daily  atorvastatin 40 milliGRAM(s) Oral at bedtime  benzocaine 15 mG/menthol 3.6 mG Lozenge 1 Lozenge Oral every 3 hours  escitalopram 20 milliGRAM(s) Oral daily  finasteride 5 milliGRAM(s) Oral daily  folic acid 1 milliGRAM(s) Oral daily  glucagon  Injectable 1 milliGRAM(s) IntraMuscular once  heparin   Injectable 5000 Unit(s) SubCutaneous every 12 hours  insulin glargine Injectable (LANTUS) 15 Unit(s) SubCutaneous at bedtime  insulin lispro (ADMELOG) corrective regimen sliding scale   SubCutaneous three times a day before meals  insulin lispro (ADMELOG) corrective regimen sliding scale   SubCutaneous at bedtime  levothyroxine 137 MICROGram(s) Oral daily  losartan 100 milliGRAM(s) Oral daily  methocarbamol 750 milliGRAM(s) Oral every 8 hours  multivitamin 1 Tablet(s) Oral daily  pantoprazole    Tablet 40 milliGRAM(s) Oral before breakfast  polyethylene glycol 3350 17 Gram(s) Oral daily  propranolol 80 milliGRAM(s) Oral every 12 hours  senna 2 Tablet(s) Oral at bedtime  tamsulosin 0.4 milliGRAM(s) Oral at bedtime    MEDICATIONS  (PRN):  bisacodyl 5 milliGRAM(s) Oral every 12 hours PRN Constipation  dextrose Oral Gel 15 Gram(s) Oral once PRN Blood Glucose LESS THAN 70 milliGRAM(s)/deciliter  famotidine    Tablet 20 milliGRAM(s) Oral every 12 hours PRN Dyspepsia  hydrALAZINE 10 milliGRAM(s) Oral every 4 hours PRN Systolic blood pressure >160  ketorolac   Injectable 30 milliGRAM(s) IV Push every 6 hours PRN Severe Pain (7 - 10)  naloxone Injectable 0.1 milliGRAM(s) IV Push every 3 minutes PRN For ANY of the following changes in patient status:  A. RR LESS THAN 10 breaths per minute, B. Oxygen saturation LESS THAN 90%, C. Sedation score of 6  ondansetron Injectable 4 milliGRAM(s) IV Push every 6 hours PRN Nausea and/or Vomiting  oxyCODONE    IR 2.5 milliGRAM(s) Oral every 4 hours PRN Moderate Pain (4 - 6)      IMPRESSION:  71 yo male with above pmh a/w:    # cervical spine stenosis/myelopathy s/p C3-7 laminoplasty   -add 10 mg oxycodone for severe pain, morphine for breakthrough pain.   -physical therapy.   -increase miralax to bid, start lactulose,  continue senna, and dulcolax for constipation.   -incentive spirometry.     # Iron deficiency anemia with acute blood loss anemia related to surgery  -monitor h/h    #CAD with h/o CABG x 3, multi stents  -continue asa/statin.     #HTN  -likely exacerbated by pain.   -Cont losartan, propranolol    #depression  cont lexapro    #hypothyroidism  cont levothyroxine    #DM type 2  -on lantus/ss    # H/O GERD/PUD  PPI    #BPH  - finesteride/tamsulosin    #VTE prophylaxis  hep sq  venodynes  amb with PT

## 2022-10-22 NOTE — PROGRESS NOTE ADULT - SUBJECTIVE AND OBJECTIVE BOX
72 year old man with a cardiac history significant for severe CAD, MI, remote coronary stents, recent CABG x 3 (4/2022), HTN, HLD, normal LV function, who was admitted on 10/19/22 for elective C3-6 Laminoplasty from the left post cervical spine. Postoperative had increased blood pressure over night due to pain    10/20 POD#1 C3-6 Laminoplasty, cardiology consulted for blood pressure management, PCA ordered, voiding spontaneously overnight after some retention (figueroa never placed) Hospitalist also consulted to follow along    10/21 POD#2 C3-6 Laminoplasty, feels much better this morning, AOx3, states he had muscle spasm relief with robaxin overnight    10/22 POD#3 3-6 laminoplasty no events overnight, robaxin has been helping per patient    Vital Signs Last 24 Hrs  T(C): 36.7 (22 Oct 2022 09:03), Max: 37 (21 Oct 2022 16:13)  T(F): 98.1 (22 Oct 2022 09:03), Max: 98.6 (21 Oct 2022 16:13)  HR: 66 (22 Oct 2022 09:03) (66 - 72)  BP: 159/58 (22 Oct 2022 09:03) (138/64 - 159/58)  BP(mean): --  RR: 17 (22 Oct 2022 09:03) (17 - 17)  SpO2: 94% (22 Oct 2022 09:03) (94% - 97%)    Parameters below as of 22 Oct 2022 09:03  Patient On (Oxygen Delivery Method): room air    MEDICATIONS  (STANDING):  aspirin  chewable 81 milliGRAM(s) Oral daily  atorvastatin 40 milliGRAM(s) Oral at bedtime  benzocaine 15 mG/menthol 3.6 mG Lozenge 1 Lozenge Oral every 3 hours  escitalopram 20 milliGRAM(s) Oral daily  finasteride 5 milliGRAM(s) Oral daily  folic acid 1 milliGRAM(s) Oral daily  glucagon  Injectable 1 milliGRAM(s) IntraMuscular once  heparin   Injectable 5000 Unit(s) SubCutaneous every 12 hours  insulin glargine Injectable (LANTUS) 15 Unit(s) SubCutaneous at bedtime  insulin lispro (ADMELOG) corrective regimen sliding scale   SubCutaneous three times a day before meals  insulin lispro (ADMELOG) corrective regimen sliding scale   SubCutaneous at bedtime  levothyroxine 137 MICROGram(s) Oral daily  losartan 100 milliGRAM(s) Oral daily  methocarbamol 750 milliGRAM(s) Oral every 8 hours  multivitamin 1 Tablet(s) Oral daily  pantoprazole    Tablet 40 milliGRAM(s) Oral before breakfast  polyethylene glycol 3350 17 Gram(s) Oral daily  propranolol 80 milliGRAM(s) Oral every 12 hours  senna 2 Tablet(s) Oral at bedtime  tamsulosin 0.4 milliGRAM(s) Oral at bedtime                          8.8    9.69  )-----------( 150      ( 22 Oct 2022 09:21 )             29.2   10-21    137  |  104  |  12  ----------------------------<  107<H>  3.7   |  29  |  0.62    Ca    8.9      21 Oct 2022 08:37    TPro  5.5<L>  /  Alb  2.5<L>  /  TBili  0.6  /  DBili  0.2  /  AST  14<L>  /  ALT  15  /  AlkPhos  43  10-21  PHYSICAL EXAM:  Constitutional: pleasant this am, cooperative with exam  HEENT:  Pupils reactive, sclera clear, EOMI, no nystagmus  Pulmonary: Non-labored, breath sounds are clear bilaterally, No wheezing, rales or rhonchi  Cardiovascular: S1 and S2, regular rate and rhythm  Gastrointestinal: Bowel Sounds present, soft, nontender.   Lymph: No peripheral edema. No cervical lymphadenopathy.  Neurological: Alert, no focal deficits; + mild tremor  Skin: No rashes.    Neurologic  Exam:    Awake, alert sitting up in bed    follows commands  Pupils reactive bilat, EOMI, no nystagmus  speech slow but clear, keeps eyes closed for exam  dressing intact with some serous sanguinous drainage, new dressing applied, incision intact with no drainage or collection  bilateral UE 5/5, bilat LE 5/5  sensation grossly intact

## 2022-10-22 NOTE — PROGRESS NOTE ADULT - ASSESSMENT
72 year old male POD#1 C3-6 Laminoplasty  Cardiology & Hospitalist input appreciated  current pain regimen appears to be working  labs stable  soft collar for comfort  encouraged patient to participate w PT & OOB w assist   on heparin SQ for DVT prophylaxis  incentive spirometry  d/w Dr Keller

## 2022-10-23 ENCOUNTER — TRANSCRIPTION ENCOUNTER (OUTPATIENT)
Age: 72
End: 2022-10-23

## 2022-10-23 PROCEDURE — 99232 SBSQ HOSP IP/OBS MODERATE 35: CPT

## 2022-10-23 RX ORDER — PANTOPRAZOLE SODIUM 20 MG/1
1 TABLET, DELAYED RELEASE ORAL
Qty: 10 | Refills: 0
Start: 2022-10-23 | End: 2022-11-01

## 2022-10-23 RX ORDER — OXYCODONE HYDROCHLORIDE 5 MG/1
1 TABLET ORAL
Qty: 72 | Refills: 0
Start: 2022-10-23 | End: 2022-11-03

## 2022-10-23 RX ORDER — TRAZODONE HCL 50 MG
1 TABLET ORAL
Qty: 0 | Refills: 0 | DISCHARGE

## 2022-10-23 RX ORDER — METHOCARBAMOL 500 MG/1
1 TABLET, FILM COATED ORAL
Qty: 42 | Refills: 0
Start: 2022-10-23 | End: 2022-11-05

## 2022-10-23 RX ORDER — ACETAMINOPHEN 500 MG
1000 TABLET ORAL ONCE
Refills: 0 | Status: COMPLETED | OUTPATIENT
Start: 2022-10-23 | End: 2022-10-23

## 2022-10-23 RX ORDER — TAMSULOSIN HYDROCHLORIDE 0.4 MG/1
1 CAPSULE ORAL
Qty: 15 | Refills: 0
Start: 2022-10-23 | End: 2022-11-06

## 2022-10-23 RX ORDER — TRAZODONE HCL 50 MG
1 TABLET ORAL
Qty: 7 | Refills: 0
Start: 2022-10-23 | End: 2022-10-29

## 2022-10-23 RX ORDER — POLYETHYLENE GLYCOL 3350 17 G/17G
17 POWDER, FOR SOLUTION ORAL
Qty: 170 | Refills: 0
Start: 2022-10-23 | End: 2022-10-27

## 2022-10-23 RX ORDER — SENNA PLUS 8.6 MG/1
2 TABLET ORAL
Qty: 10 | Refills: 0
Start: 2022-10-23 | End: 2022-10-27

## 2022-10-23 RX ADMIN — ESCITALOPRAM OXALATE 20 MILLIGRAM(S): 10 TABLET, FILM COATED ORAL at 10:31

## 2022-10-23 RX ADMIN — Medication 30 MILLIGRAM(S): at 10:59

## 2022-10-23 RX ADMIN — Medication 30 MILLIGRAM(S): at 04:17

## 2022-10-23 RX ADMIN — OXYCODONE HYDROCHLORIDE 10 MILLIGRAM(S): 5 TABLET ORAL at 18:29

## 2022-10-23 RX ADMIN — Medication 400 MILLIGRAM(S): at 21:52

## 2022-10-23 RX ADMIN — POLYETHYLENE GLYCOL 3350 17 GRAM(S): 17 POWDER, FOR SOLUTION ORAL at 21:52

## 2022-10-23 RX ADMIN — Medication 30 MILLIGRAM(S): at 10:29

## 2022-10-23 RX ADMIN — Medication 1 TABLET(S): at 10:30

## 2022-10-23 RX ADMIN — LACTULOSE 20 GRAM(S): 10 SOLUTION ORAL at 10:32

## 2022-10-23 RX ADMIN — MORPHINE SULFATE 2 MILLIGRAM(S): 50 CAPSULE, EXTENDED RELEASE ORAL at 12:43

## 2022-10-23 RX ADMIN — LACTULOSE 20 GRAM(S): 10 SOLUTION ORAL at 21:53

## 2022-10-23 RX ADMIN — TAMSULOSIN HYDROCHLORIDE 0.4 MILLIGRAM(S): 0.4 CAPSULE ORAL at 21:53

## 2022-10-23 RX ADMIN — MORPHINE SULFATE 2 MILLIGRAM(S): 50 CAPSULE, EXTENDED RELEASE ORAL at 05:57

## 2022-10-23 RX ADMIN — Medication 81 MILLIGRAM(S): at 10:30

## 2022-10-23 RX ADMIN — HEPARIN SODIUM 5000 UNIT(S): 5000 INJECTION INTRAVENOUS; SUBCUTANEOUS at 21:53

## 2022-10-23 RX ADMIN — Medication 30 MILLIGRAM(S): at 17:49

## 2022-10-23 RX ADMIN — HEPARIN SODIUM 5000 UNIT(S): 5000 INJECTION INTRAVENOUS; SUBCUTANEOUS at 10:29

## 2022-10-23 RX ADMIN — OXYCODONE HYDROCHLORIDE 10 MILLIGRAM(S): 5 TABLET ORAL at 08:50

## 2022-10-23 RX ADMIN — POLYETHYLENE GLYCOL 3350 17 GRAM(S): 17 POWDER, FOR SOLUTION ORAL at 10:32

## 2022-10-23 RX ADMIN — SENNA PLUS 2 TABLET(S): 8.6 TABLET ORAL at 21:52

## 2022-10-23 RX ADMIN — FINASTERIDE 5 MILLIGRAM(S): 5 TABLET, FILM COATED ORAL at 10:31

## 2022-10-23 RX ADMIN — ATORVASTATIN CALCIUM 40 MILLIGRAM(S): 80 TABLET, FILM COATED ORAL at 21:53

## 2022-10-23 RX ADMIN — OXYCODONE HYDROCHLORIDE 10 MILLIGRAM(S): 5 TABLET ORAL at 00:48

## 2022-10-23 RX ADMIN — Medication 30 MILLIGRAM(S): at 17:19

## 2022-10-23 RX ADMIN — MORPHINE SULFATE 2 MILLIGRAM(S): 50 CAPSULE, EXTENDED RELEASE ORAL at 04:53

## 2022-10-23 RX ADMIN — Medication 30 MILLIGRAM(S): at 04:50

## 2022-10-23 RX ADMIN — OXYCODONE HYDROCHLORIDE 10 MILLIGRAM(S): 5 TABLET ORAL at 13:30

## 2022-10-23 RX ADMIN — METHOCARBAMOL 750 MILLIGRAM(S): 500 TABLET, FILM COATED ORAL at 05:50

## 2022-10-23 RX ADMIN — LOSARTAN POTASSIUM 100 MILLIGRAM(S): 100 TABLET, FILM COATED ORAL at 10:41

## 2022-10-23 RX ADMIN — INSULIN GLARGINE 15 UNIT(S): 100 INJECTION, SOLUTION SUBCUTANEOUS at 21:55

## 2022-10-23 RX ADMIN — Medication 1000 MILLIGRAM(S): at 13:59

## 2022-10-23 RX ADMIN — METHOCARBAMOL 750 MILLIGRAM(S): 500 TABLET, FILM COATED ORAL at 13:29

## 2022-10-23 RX ADMIN — OXYCODONE HYDROCHLORIDE 10 MILLIGRAM(S): 5 TABLET ORAL at 18:59

## 2022-10-23 RX ADMIN — MORPHINE SULFATE 2 MILLIGRAM(S): 50 CAPSULE, EXTENDED RELEASE ORAL at 13:30

## 2022-10-23 RX ADMIN — OXYCODONE HYDROCHLORIDE 10 MILLIGRAM(S): 5 TABLET ORAL at 09:20

## 2022-10-23 RX ADMIN — Medication 1 MILLIGRAM(S): at 10:30

## 2022-10-23 RX ADMIN — Medication 400 MILLIGRAM(S): at 13:29

## 2022-10-23 RX ADMIN — Medication 137 MICROGRAM(S): at 05:50

## 2022-10-23 RX ADMIN — OXYCODONE HYDROCHLORIDE 10 MILLIGRAM(S): 5 TABLET ORAL at 13:00

## 2022-10-23 RX ADMIN — PANTOPRAZOLE SODIUM 40 MILLIGRAM(S): 20 TABLET, DELAYED RELEASE ORAL at 05:50

## 2022-10-23 RX ADMIN — Medication 1000 MILLIGRAM(S): at 22:10

## 2022-10-23 NOTE — DISCHARGE NOTE PROVIDER - NSDCFUADDINST_GEN_ALL_CORE_FT
No strenous activity -   Please wear collar for comfort  keep dressing clean and dry  Take laxatives to encourage regular daily motions as you also take narcotics  caution with taking narcotics and ambulation while at home

## 2022-10-23 NOTE — PROGRESS NOTE ADULT - SUBJECTIVE AND OBJECTIVE BOX
PCP:  Dr Beka Abarca    c/c: LUE numbness    HPI:  72M,  pmh of HTN, HLD, CAD/MI X 2, CABG april 2022, multiple cardiac stents/balloon angioplasties, DMII,  bell's palsy, BPH, OA,  thyroid Ca, S/P thyroidectomy, PUD, depression and spinal stenosis with cc of numbness to left arm and hand, Now on 2 north S/P planned C3-7 laminoplasty.   Hospitalist service consulted for medical comanagement.     Pt seen and examined this am. Stated pain better controlled.  Ambulated down hallway. No sob/chest pain. tolerating po. No difficulty voiding.     ROS: all 10 systems reviewed and is as above otherwise negative.     Vital Signs Last 24 Hrs  T(C): 36.6 (23 Oct 2022 08:16), Max: 37.3 (22 Oct 2022 20:11)  T(F): 97.9 (23 Oct 2022 08:16), Max: 99.1 (22 Oct 2022 20:11)  HR: 61 (23 Oct 2022 08:16) (61 - 64)  BP: 153/78 (23 Oct 2022 08:16) (134/72 - 153/78)  RR: 18 (23 Oct 2022 08:16) (16 - 18)  SpO2: 98% (23 Oct 2022 08:16) (97% - 98%)    Parameters below as of 23 Oct 2022 08:16  Patient On (Oxygen Delivery Method): room air        PHYSICAL EXAM:  GENERAL: Comfortable, no acute distress   HEAD:  Normocephalic, atraumatic  EYES: EOMI, PERRLA  HEENT: Moist mucous membranes  NECK: Supple, No JVD  NERVOUS SYSTEM:  Alert & Oriented X3, Motor Strength 5/5 B/L upper and lower extremities  CHEST/LUNG: Clear to auscultation bilaterally  HEART: Regular rate and rhythm  ABDOMEN: Soft, non tender, distended, Bowel sounds present  GENITOURINARY: Voiding, no palpable bladder  EXTREMITIES:   No clubbing, cyanosis, or edema  MUSCULOSKELETAL- c spine posterior neck dsg c/d/i  SKIN-no rash    LABS:                        8.8    9.69  )-----------( 150      ( 22 Oct 2022 09:21 )             29.2     10-22    138  |  105  |  13  ----------------------------<  82  3.5   |  29  |  0.68    Ca    8.7      22 Oct 2022 09:21        MEDICATIONS  (STANDING):  acetaminophen   IVPB .. 1000 milliGRAM(s) IV Intermittent once  aspirin  chewable 81 milliGRAM(s) Oral daily  atorvastatin 40 milliGRAM(s) Oral at bedtime  benzocaine 15 mG/menthol 3.6 mG Lozenge 1 Lozenge Oral every 3 hours  bisacodyl Suppository 10 milliGRAM(s) Rectal once  escitalopram 20 milliGRAM(s) Oral daily  finasteride 5 milliGRAM(s) Oral daily  folic acid 1 milliGRAM(s) Oral daily  glucagon  Injectable 1 milliGRAM(s) IntraMuscular once  heparin   Injectable 5000 Unit(s) SubCutaneous every 12 hours  insulin glargine Injectable (LANTUS) 15 Unit(s) SubCutaneous at bedtime  insulin lispro (ADMELOG) corrective regimen sliding scale   SubCutaneous three times a day before meals  insulin lispro (ADMELOG) corrective regimen sliding scale   SubCutaneous at bedtime  lactulose Syrup 20 Gram(s) Oral two times a day  levothyroxine 137 MICROGram(s) Oral daily  losartan 100 milliGRAM(s) Oral daily  methocarbamol 750 milliGRAM(s) Oral every 8 hours  multivitamin 1 Tablet(s) Oral daily  pantoprazole    Tablet 40 milliGRAM(s) Oral before breakfast  polyethylene glycol 3350 17 Gram(s) Oral two times a day  propranolol 80 milliGRAM(s) Oral every 12 hours  senna 2 Tablet(s) Oral at bedtime  tamsulosin 0.4 milliGRAM(s) Oral at bedtime    MEDICATIONS  (PRN):  bisacodyl 5 milliGRAM(s) Oral every 12 hours PRN Constipation  dextrose Oral Gel 15 Gram(s) Oral once PRN Blood Glucose LESS THAN 70 milliGRAM(s)/deciliter  famotidine    Tablet 20 milliGRAM(s) Oral every 12 hours PRN Dyspepsia  hydrALAZINE 10 milliGRAM(s) Oral every 4 hours PRN Systolic blood pressure >160  ketorolac   Injectable 30 milliGRAM(s) IV Push every 6 hours PRN Severe Pain (7 - 10)  morphine  - Injectable 2 milliGRAM(s) IV Push every 4 hours PRN breakthrough pain  naloxone Injectable 0.1 milliGRAM(s) IV Push every 3 minutes PRN For ANY of the following changes in patient status:  A. RR LESS THAN 10 breaths per minute, B. Oxygen saturation LESS THAN 90%, C. Sedation score of 6  ondansetron Injectable 4 milliGRAM(s) IV Push every 6 hours PRN Nausea and/or Vomiting  oxyCODONE    IR 5 milliGRAM(s) Oral every 4 hours PRN Moderate Pain (4 - 6)  oxyCODONE    IR 10 milliGRAM(s) Oral every 4 hours PRN Severe Pain (7 - 10)    ASSESSMENT AND PLAN:  71 yo male with above pmh a/w:    # cervical spine stenosis/myelopathy s/p C3-7 laminoplasty   -pain control.   -physical therapy.   -increased miralax to bid, started lactulose,  continue senna, and dulcolax for constipation.   -incentive spirometry.     # Iron deficiency anemia with acute blood loss anemia related to surgery  -monitor h/h    #CAD with h/o CABG x 3, multi stents  -continue asa/statin.     #HTN  -likely exacerbated by pain.   -Cont losartan, propranolol    #depression  cont lexapro    #hypothyroidism  cont levothyroxine    #DM type 2  -on lantus/ss    # H/O GERD/PUD  PPI    #BPH  - finesteride/tamsulosin    #VTE prophylaxis  hep sq  venodynes  amb with PT

## 2022-10-23 NOTE — DISCHARGE NOTE PROVIDER - NSDCCPTREATMENT_GEN_ALL_CORE_FT
PRINCIPAL PROCEDURE  Procedure: Laminoplasty, spine, cervical, 3 levels, posterior approach  Findings and Treatment:

## 2022-10-23 NOTE — DISCHARGE NOTE PROVIDER - CARE PROVIDER_API CALL
Manny Keller; PhD)  Neurosurgery  [address] [facility], [address] [place] [address]  Phone: [phone]  Fax: [phone]  Follow Up Time:

## 2022-10-23 NOTE — DISCHARGE NOTE PROVIDER - NSDCMRMEDTOKEN_GEN_ALL_CORE_FT
aspirin 81 mg oral tablet, chewable: 1 tab(s) orally once a day pt instructed to follow preop instructions as per cardiologist and surgeon  Crestor 20 mg oral tablet: 1 tab(s) orally once a day  Jardiance 10 mg oral tablet: 1 tab(s) orally once a day (in the morning) hold 3-4 days before surgery  levothyroxine 137 mcg (0.137 mg) oral tablet: 1 tab(s) orally once a day  Lexapro 20 mg oral tablet: 1 tab(s) orally once a day  losartan-hydrochlorothiazide 100 mg-25 mg oral tablet: 1 tab(s) orally once a day  MetFORMIN (Eqv-Fortamet) 1000 mg oral tablet, extended release: 1 tab(s) orally 2 times a day  methocarbamol 750 mg oral tablet: 1 tab(s) orally every 8 hours, As Needed -for muscle spasm MDD:3   oxyCODONE 10 mg oral tablet: 1 tab(s) orally every 4 hours, As Needed -Severe Pain (7 - 10) - for severe pain MDD:6   pantoprazole 40 mg oral delayed release tablet: 1 tab(s) orally once a day (before a meal) MDD:1  Pepcid 20 mg oral tablet: 1 tab(s) orally 2 times a day  polyethylene glycol 3350 oral powder for reconstitution: 17 gram(s) orally 2 times a day, As Needed -for constipation MDD:17   propranolol 160 mg oral capsule, extended release: 1 cap(s) orally once a day  Proscar 5 mg oral tablet: 1 tab(s) orally once a day  senna leaf extract oral tablet: 2 tab(s) orally once a day (at bedtime), As Needed -for constipation MDD:2   tamsulosin 0.4 mg oral capsule: 1 cap(s) orally once a day (at bedtime) MDD:1  traZODone 100 mg oral tablet: 1 tab(s) orally once a day (at bedtime), As Needed -for insomnia MDD:1   Victoza 18 mg/3 mL subcutaneous solution: 1 dose(s) subcutaneous once a day

## 2022-10-23 NOTE — DISCHARGE NOTE PROVIDER - HOSPITAL COURSE
72 year old man with a cardiac history significant for severe CAD, MI, remote coronary stents, recent CABG x 3 (4/2022), HTN, HLD, normal LV function, who was admitted on 10/19/22 for elective C3-6 Laminoplasty from the left post cervical spine. Postoperative had increased blood pressure over night due to pain    10/20 POD#1 C3-6 Laminoplasty, cardiology consulted for blood pressure management, PCA ordered, voiding spontaneously overnight after some retention (figueroa never placed) Hospitalist also consulted to follow along    10/21 POD#2 C3-6 Laminoplasty, feels much better this morning, AOx3, states he had muscle spasm relief with robaxin overnight    10/22 POD#3 3-6 laminoplasty no events overnight, robaxin has been helping per patient.      -add 10 mg oxycodone for severe pain, morphine for breakthrough pain.   -physical therapy.   -increase miralax to bid, start lactulose,  continue senna, and dulcolax for constipation.   -incentive spirometry.     ** Pt is to RESTART PLAVIX on 10/26/22     72 year old man with a cardiac history significant for severe CAD, MI, remote coronary stents, recent CABG x 3 (4/2022), HTN, HLD, normal LV function, who was admitted on 10/19/22 for elective C3-6 Laminoplasty from the left post cervical spine. Postoperative had increased blood pressure over night due to pain    10/20 POD#1 C3-6 Laminoplasty, cardiology consulted for blood pressure management, PCA ordered, voiding spontaneously overnight after some retention (figueroa never placed) Hospitalist also consulted to follow along    10/21 POD#2 C3-6 Laminoplasty, feels much better this morning, AOx3, states he had muscle spasm relief with robaxin overnight    10/22 POD#3 3-6 laminoplasty no events overnight, robaxin has been helping per patient.      -add 10 mg oxycodone for severe pain, morphine for breakthrough pain.   -physical therapy.   -increase miralax to bid, start lactulose,  continue senna, and dulcolax for constipation.   -incentive spirometry.     ** Pt is to RESTART APIRIN AND / OR PLAVIX on 10/26/22

## 2022-10-23 NOTE — PROGRESS NOTE ADULT - SUBJECTIVE AND OBJECTIVE BOX
Neurosurgery:    72 year old man with a cardiac history significant for severe CAD, MI, remote coronary stents, recent CABG x 3 (4/2022), HTN, HLD, normal LV function, who was admitted on 10/19/22 for elective C3-6 Laminoplasty from the left post cervical spine. Postoperative had increased blood pressure over night due to pain    10/20 POD#1 C3-6 Laminoplasty, cardiology consulted for blood pressure management, PCA ordered, voiding spontaneously overnight after some retention (figueroa never placed) Hospitalist also consulted to follow along    10/21 POD#2 C3-6 Laminoplasty, feels much better this morning, AOx3, states he had muscle spasm relief with robaxin overnight    10/22 POD#3 3-6 laminoplasty no events overnight, robaxin has been helping per patient. Oxy 10mg for Severe pain rx'd w/ Morphine breakthrough.  Awaits BM    10/23 POD#4 C3-6 laminoplasty.  Intend for discharge later today.  Paperwork complete.  PLAVIX to restart on 10/26.    ** Pt is to RESTART PLAVIX on 10/26/22    MEDICATIONS  (STANDING):  aspirin  chewable 81 milliGRAM(s) Oral daily  atorvastatin 40 milliGRAM(s) Oral at bedtime  benzocaine 15 mG/menthol 3.6 mG Lozenge 1 Lozenge Oral every 3 hours  escitalopram 20 milliGRAM(s) Oral daily  finasteride 5 milliGRAM(s) Oral daily  folic acid 1 milliGRAM(s) Oral daily  glucagon  Injectable 1 milliGRAM(s) IntraMuscular once  heparin   Injectable 5000 Unit(s) SubCutaneous every 12 hours  insulin glargine Injectable (LANTUS) 15 Unit(s) SubCutaneous at bedtime  insulin lispro (ADMELOG) corrective regimen sliding scale   SubCutaneous at bedtime  insulin lispro (ADMELOG) corrective regimen sliding scale   SubCutaneous three times a day before meals  lactulose Syrup 20 Gram(s) Oral two times a day  levothyroxine 137 MICROGram(s) Oral daily  losartan 100 milliGRAM(s) Oral daily  methocarbamol 750 milliGRAM(s) Oral every 8 hours  multivitamin 1 Tablet(s) Oral daily  pantoprazole    Tablet 40 milliGRAM(s) Oral before breakfast  polyethylene glycol 3350 17 Gram(s) Oral two times a day  propranolol 80 milliGRAM(s) Oral every 12 hours  senna 2 Tablet(s) Oral at bedtime  tamsulosin 0.4 milliGRAM(s) Oral at bedtime     Vital Signs Last 24 Hrs  T(C): 36.7 (23 Oct 2022 00:48), Max: 37.3 (22 Oct 2022 20:11)  T(F): 98.1 (23 Oct 2022 00:48), Max: 99.1 (22 Oct 2022 20:11)  HR: 64 (23 Oct 2022 00:48) (64 - 66)  BP: 139/63 (23 Oct 2022 00:48) (134/72 - 159/58)  BP(mean): --  RR: 18 (23 Oct 2022 00:48) (16 - 18)  SpO2: 97% (23 Oct 2022 00:48) (94% - 98%)      Labs:                        8.8    9.69  )-----------( 150      ( 22 Oct 2022 09:21 )             29.2     10-22    138  |  105  |  13  ----------------------------<  82  3.5   |  29  |  0.68    Ca    8.7      22 Oct 2022 09:21    TPro  5.5<L>  /  Alb  2.5<L>  /  TBili  0.6  /  DBili  0.2  /  AST  14<L>  /  ALT  15  /  AlkPhos  43  10-21          Assessment and Plan:   · Assessment	  72 year old male POD#1 C3-6 Laminoplasty  Cardiology & Hospitalist input appreciated  current pain regimen appears to be working  labs stable  soft collar for comfort  encouraged patient to participate w PT & OOB w assist   on heparin SQ for DVT prophylaxis  incentive spirometry  d/w Dr Keller

## 2022-10-24 LAB
ANION GAP SERPL CALC-SCNC: 3 MMOL/L — LOW (ref 5–17)
BUN SERPL-MCNC: 16 MG/DL — SIGNIFICANT CHANGE UP (ref 7–23)
CALCIUM SERPL-MCNC: 8.8 MG/DL — SIGNIFICANT CHANGE UP (ref 8.5–10.1)
CHLORIDE SERPL-SCNC: 104 MMOL/L — SIGNIFICANT CHANGE UP (ref 96–108)
CO2 SERPL-SCNC: 29 MMOL/L — SIGNIFICANT CHANGE UP (ref 22–31)
CREAT SERPL-MCNC: 0.81 MG/DL — SIGNIFICANT CHANGE UP (ref 0.5–1.3)
EGFR: 94 ML/MIN/1.73M2 — SIGNIFICANT CHANGE UP
GLUCOSE SERPL-MCNC: 99 MG/DL — SIGNIFICANT CHANGE UP (ref 70–99)
HCT VFR BLD CALC: 30.1 % — LOW (ref 39–50)
HGB BLD-MCNC: 9 G/DL — LOW (ref 13–17)
MAGNESIUM SERPL-MCNC: 2.1 MG/DL — SIGNIFICANT CHANGE UP (ref 1.6–2.6)
MCHC RBC-ENTMCNC: 22.8 PG — LOW (ref 27–34)
MCHC RBC-ENTMCNC: 29.9 GM/DL — LOW (ref 32–36)
MCV RBC AUTO: 76.4 FL — LOW (ref 80–100)
PHOSPHATE SERPL-MCNC: 1.1 MG/DL — LOW (ref 2.5–4.5)
PLATELET # BLD AUTO: 208 K/UL — SIGNIFICANT CHANGE UP (ref 150–400)
POTASSIUM SERPL-MCNC: 4 MMOL/L — SIGNIFICANT CHANGE UP (ref 3.5–5.3)
POTASSIUM SERPL-SCNC: 4 MMOL/L — SIGNIFICANT CHANGE UP (ref 3.5–5.3)
RBC # BLD: 3.94 M/UL — LOW (ref 4.2–5.8)
RBC # FLD: 22.2 % — HIGH (ref 10.3–14.5)
SODIUM SERPL-SCNC: 136 MMOL/L — SIGNIFICANT CHANGE UP (ref 135–145)
WBC # BLD: 7.31 K/UL — SIGNIFICANT CHANGE UP (ref 3.8–10.5)
WBC # FLD AUTO: 7.31 K/UL — SIGNIFICANT CHANGE UP (ref 3.8–10.5)

## 2022-10-24 PROCEDURE — 72125 CT NECK SPINE W/O DYE: CPT | Mod: 26

## 2022-10-24 PROCEDURE — 99232 SBSQ HOSP IP/OBS MODERATE 35: CPT

## 2022-10-24 RX ORDER — ACETAMINOPHEN 500 MG
1000 TABLET ORAL ONCE
Refills: 0 | Status: COMPLETED | OUTPATIENT
Start: 2022-10-24 | End: 2022-10-24

## 2022-10-24 RX ORDER — OXYCODONE HYDROCHLORIDE 5 MG/1
15 TABLET ORAL EVERY 4 HOURS
Refills: 0 | Status: DISCONTINUED | OUTPATIENT
Start: 2022-10-24 | End: 2022-10-24

## 2022-10-24 RX ORDER — DIAZEPAM 5 MG
2.5 TABLET ORAL ONCE
Refills: 0 | Status: DISCONTINUED | OUTPATIENT
Start: 2022-10-24 | End: 2022-10-24

## 2022-10-24 RX ORDER — ACETAMINOPHEN 500 MG
650 TABLET ORAL ONCE
Refills: 0 | Status: COMPLETED | OUTPATIENT
Start: 2022-10-24 | End: 2022-10-24

## 2022-10-24 RX ORDER — ACETAMINOPHEN 500 MG
1000 TABLET ORAL ONCE
Refills: 0 | Status: DISCONTINUED | OUTPATIENT
Start: 2022-10-24 | End: 2022-10-25

## 2022-10-24 RX ORDER — DIAZEPAM 5 MG
2.5 TABLET ORAL EVERY 6 HOURS
Refills: 0 | Status: DISCONTINUED | OUTPATIENT
Start: 2022-10-24 | End: 2022-10-24

## 2022-10-24 RX ORDER — DIAZEPAM 5 MG
2.5 TABLET ORAL EVERY 6 HOURS
Refills: 0 | Status: DISCONTINUED | OUTPATIENT
Start: 2022-10-24 | End: 2022-10-25

## 2022-10-24 RX ORDER — LIDOCAINE 4 G/100G
2 CREAM TOPICAL DAILY
Refills: 0 | Status: DISCONTINUED | OUTPATIENT
Start: 2022-10-24 | End: 2022-10-25

## 2022-10-24 RX ORDER — POTASSIUM PHOSPHATE, MONOBASIC POTASSIUM PHOSPHATE, DIBASIC 236; 224 MG/ML; MG/ML
30 INJECTION, SOLUTION INTRAVENOUS EVERY 6 HOURS
Refills: 0 | Status: DISCONTINUED | OUTPATIENT
Start: 2022-10-24 | End: 2022-10-24

## 2022-10-24 RX ORDER — HYDROMORPHONE HYDROCHLORIDE 2 MG/ML
0.5 INJECTION INTRAMUSCULAR; INTRAVENOUS; SUBCUTANEOUS ONCE
Refills: 0 | Status: DISCONTINUED | OUTPATIENT
Start: 2022-10-24 | End: 2022-10-24

## 2022-10-24 RX ORDER — OXYCODONE HYDROCHLORIDE 5 MG/1
10 TABLET ORAL EVERY 4 HOURS
Refills: 0 | Status: DISCONTINUED | OUTPATIENT
Start: 2022-10-24 | End: 2022-10-25

## 2022-10-24 RX ADMIN — LIDOCAINE 2 PATCH: 4 CREAM TOPICAL at 17:59

## 2022-10-24 RX ADMIN — Medication 400 MILLIGRAM(S): at 18:08

## 2022-10-24 RX ADMIN — MORPHINE SULFATE 2 MILLIGRAM(S): 50 CAPSULE, EXTENDED RELEASE ORAL at 00:27

## 2022-10-24 RX ADMIN — SENNA PLUS 2 TABLET(S): 8.6 TABLET ORAL at 21:14

## 2022-10-24 RX ADMIN — TAMSULOSIN HYDROCHLORIDE 0.4 MILLIGRAM(S): 0.4 CAPSULE ORAL at 21:12

## 2022-10-24 RX ADMIN — Medication 1 MILLIGRAM(S): at 09:33

## 2022-10-24 RX ADMIN — MORPHINE SULFATE 2 MILLIGRAM(S): 50 CAPSULE, EXTENDED RELEASE ORAL at 04:34

## 2022-10-24 RX ADMIN — Medication 650 MILLIGRAM(S): at 06:23

## 2022-10-24 RX ADMIN — Medication 2.5 MILLIGRAM(S): at 23:07

## 2022-10-24 RX ADMIN — Medication 650 MILLIGRAM(S): at 06:35

## 2022-10-24 RX ADMIN — INSULIN GLARGINE 15 UNIT(S): 100 INJECTION, SOLUTION SUBCUTANEOUS at 21:13

## 2022-10-24 RX ADMIN — Medication 2.5 MILLIGRAM(S): at 08:35

## 2022-10-24 RX ADMIN — HEPARIN SODIUM 5000 UNIT(S): 5000 INJECTION INTRAVENOUS; SUBCUTANEOUS at 21:14

## 2022-10-24 RX ADMIN — HYDROMORPHONE HYDROCHLORIDE 0.5 MILLIGRAM(S): 2 INJECTION INTRAMUSCULAR; INTRAVENOUS; SUBCUTANEOUS at 04:33

## 2022-10-24 RX ADMIN — METHOCARBAMOL 750 MILLIGRAM(S): 500 TABLET, FILM COATED ORAL at 09:33

## 2022-10-24 RX ADMIN — OXYCODONE HYDROCHLORIDE 15 MILLIGRAM(S): 5 TABLET ORAL at 11:52

## 2022-10-24 RX ADMIN — PANTOPRAZOLE SODIUM 40 MILLIGRAM(S): 20 TABLET, DELAYED RELEASE ORAL at 05:20

## 2022-10-24 RX ADMIN — Medication 1 TABLET(S): at 09:33

## 2022-10-24 RX ADMIN — OXYCODONE HYDROCHLORIDE 10 MILLIGRAM(S): 5 TABLET ORAL at 21:09

## 2022-10-24 RX ADMIN — OXYCODONE HYDROCHLORIDE 10 MILLIGRAM(S): 5 TABLET ORAL at 06:35

## 2022-10-24 RX ADMIN — METHOCARBAMOL 750 MILLIGRAM(S): 500 TABLET, FILM COATED ORAL at 15:57

## 2022-10-24 RX ADMIN — POLYETHYLENE GLYCOL 3350 17 GRAM(S): 17 POWDER, FOR SOLUTION ORAL at 09:32

## 2022-10-24 RX ADMIN — HYDROMORPHONE HYDROCHLORIDE 0.5 MILLIGRAM(S): 2 INJECTION INTRAMUSCULAR; INTRAVENOUS; SUBCUTANEOUS at 01:15

## 2022-10-24 RX ADMIN — LACTULOSE 20 GRAM(S): 10 SOLUTION ORAL at 21:14

## 2022-10-24 RX ADMIN — Medication 85 MILLIMOLE(S): at 11:31

## 2022-10-24 RX ADMIN — Medication 1000 MILLIGRAM(S): at 18:23

## 2022-10-24 RX ADMIN — HEPARIN SODIUM 5000 UNIT(S): 5000 INJECTION INTRAVENOUS; SUBCUTANEOUS at 09:32

## 2022-10-24 RX ADMIN — FINASTERIDE 5 MILLIGRAM(S): 5 TABLET, FILM COATED ORAL at 09:34

## 2022-10-24 RX ADMIN — Medication 30 MILLIGRAM(S): at 00:09

## 2022-10-24 RX ADMIN — Medication 137 MICROGRAM(S): at 05:20

## 2022-10-24 RX ADMIN — Medication 30 MILLIGRAM(S): at 00:30

## 2022-10-24 RX ADMIN — ATORVASTATIN CALCIUM 40 MILLIGRAM(S): 80 TABLET, FILM COATED ORAL at 21:13

## 2022-10-24 RX ADMIN — ESCITALOPRAM OXALATE 20 MILLIGRAM(S): 10 TABLET, FILM COATED ORAL at 09:35

## 2022-10-24 RX ADMIN — METHOCARBAMOL 750 MILLIGRAM(S): 500 TABLET, FILM COATED ORAL at 00:01

## 2022-10-24 RX ADMIN — OXYCODONE HYDROCHLORIDE 10 MILLIGRAM(S): 5 TABLET ORAL at 21:10

## 2022-10-24 RX ADMIN — Medication 81 MILLIGRAM(S): at 09:32

## 2022-10-24 RX ADMIN — LIDOCAINE 2 PATCH: 4 CREAM TOPICAL at 06:24

## 2022-10-24 RX ADMIN — LIDOCAINE 2 PATCH: 4 CREAM TOPICAL at 07:21

## 2022-10-24 RX ADMIN — Medication 30 MILLIGRAM(S): at 07:35

## 2022-10-24 RX ADMIN — LOSARTAN POTASSIUM 100 MILLIGRAM(S): 100 TABLET, FILM COATED ORAL at 09:33

## 2022-10-24 RX ADMIN — Medication 30 MILLIGRAM(S): at 06:23

## 2022-10-24 RX ADMIN — POLYETHYLENE GLYCOL 3350 17 GRAM(S): 17 POWDER, FOR SOLUTION ORAL at 21:14

## 2022-10-24 RX ADMIN — OXYCODONE HYDROCHLORIDE 15 MILLIGRAM(S): 5 TABLET ORAL at 11:22

## 2022-10-24 RX ADMIN — OXYCODONE HYDROCHLORIDE 10 MILLIGRAM(S): 5 TABLET ORAL at 05:23

## 2022-10-24 RX ADMIN — LACTULOSE 20 GRAM(S): 10 SOLUTION ORAL at 09:35

## 2022-10-24 NOTE — PROGRESS NOTE ADULT - SUBJECTIVE AND OBJECTIVE BOX
Neurosurgery:      MEDICATIONS  (STANDING):  aspirin  chewable 81 milliGRAM(s) Oral daily  atorvastatin 40 milliGRAM(s) Oral at bedtime  benzocaine 15 mG/menthol 3.6 mG Lozenge 1 Lozenge Oral every 3 hours  bisacodyl Suppository 10 milliGRAM(s) Rectal once  diazepam  Injectable 2.5 milliGRAM(s) IV Push once  escitalopram 20 milliGRAM(s) Oral daily  finasteride 5 milliGRAM(s) Oral daily  folic acid 1 milliGRAM(s) Oral daily  glucagon  Injectable 1 milliGRAM(s) IntraMuscular once  heparin   Injectable 5000 Unit(s) SubCutaneous every 12 hours  insulin glargine Injectable (LANTUS) 15 Unit(s) SubCutaneous at bedtime  insulin lispro (ADMELOG) corrective regimen sliding scale   SubCutaneous at bedtime  insulin lispro (ADMELOG) corrective regimen sliding scale   SubCutaneous three times a day before meals  lactulose Syrup 20 Gram(s) Oral two times a day  levothyroxine 137 MICROGram(s) Oral daily  losartan 100 milliGRAM(s) Oral daily  methocarbamol 750 milliGRAM(s) Oral every 8 hours  multivitamin 1 Tablet(s) Oral daily  pantoprazole    Tablet 40 milliGRAM(s) Oral before breakfast  polyethylene glycol 3350 17 Gram(s) Oral two times a day  propranolol 80 milliGRAM(s) Oral every 12 hours  senna 2 Tablet(s) Oral at bedtime  tamsulosin 0.4 milliGRAM(s) Oral at bedtime     Vital Signs Last 24 Hrs  T(C): 36.8 (24 Oct 2022 00:00), Max: 36.8 (24 Oct 2022 00:00)  T(F): 98.2 (24 Oct 2022 00:00), Max: 98.2 (24 Oct 2022 00:00)  HR: 89 (24 Oct 2022 00:00) (61 - 89)  BP: 153/90 (24 Oct 2022 00:00) (116/61 - 153/90)  BP(mean): --  RR: 18 (24 Oct 2022 00:00) (18 - 18)  SpO2: 98% (24 Oct 2022 00:00) (96% - 98%)    Parameters below as of 24 Oct 2022 00:00  Patient On (Oxygen Delivery Method): room air        Labs:                        8.8    9.69  )-----------( 150      ( 22 Oct 2022 09:21 )             29.2     10-22    138  |  105  |  13  ----------------------------<  82  3.5   |  29  |  0.68    Ca    8.7      22 Oct 2022 09:21    CT Cspine: unofficial but verbal read via Dr. Cole:  No cord compression.  Post epidural ligamentum / Stissue element but no severe compression of cervical spinal cord    Physical Exam:  Constitutional: Awake / alert  HEENT: PERRLA, EOMI  Neck: Supple  Respiratory: Breath sounds are clear bilaterally  Cardiovascular: S1 and S2, regular rhythm  Gastrointestinal: Soft, NT/ND  Extremities:  no edema  Vascular: No carotid Bruit  Musculoskeletal: no joint swelling/tenderness, no abnormal movements  Skin: No rashes    Neurological Exam:  HF: A x O x 3, appropriately interactive, normal affect, speech fluent, no aphasia or paraphasic errors. Naming /repetition intact   CN: PERRL, EOMI, VFF, facial sensation normal, no NLFD, tongue midline  Motor: MARIANO well but pt however claims his LE's feel heaver Proximally 4/5 / Distally 5/5.   UE 5/5. No clonus or hyper-reflexia noted  Sens: Intact to light touch  Reflexes: Symmetric and normal, downgoing toes b/l  Coord:  No FNFA, dysmetria, VISH intact   Gait/Balance: Cannot test    A/P:        Total Critical Care Time spent:   Neurosurgery:    72 year old man with a cardiac history significant for severe CAD, MI, remote coronary stents, recent CABG x 3 (4/2022), HTN, HLD, normal LV function, who was admitted on 10/19/22 for elective C3-6 Laminoplasty from the left post cervical spine. Postoperative had increased blood pressure over night due to pain    10/20 POD#1 C3-6 Laminoplasty, cardiology consulted for blood pressure management, PCA ordered, voiding spontaneously overnight after some retention (figueroa never placed) Hospitalist also consulted to follow along    10/21 POD#2 C3-6 Laminoplasty, feels much better this morning, AOx3, states he had muscle spasm relief with robaxin overnight    10/22 POD#3 3-6 laminoplasty no events overnight, robaxin has been helping per patient. Oxy 10mg for Severe pain rx'd w/ Morphine breakthrough.  Awaits BM    10/23 POD#4 C3-6 laminoplasty.  Intend for discharge later today.  Paperwork complete.  PLAVIX to restart on 10/26.    10/24 POD#5 C3-6 laminoplaty.  Pt with severe spasms night.  uncomfortable rambunxious with nursing staff.  c/o burning pain neck area and when asked he said his legs feel heavy.   After medication admins to releive pain pt still c/o severe neck pain so CT of Cspine completed showing no epidural hematoma.  + post flavum as expected from laminoplasty at decompressed region but no demonstration of cord compression.  IV Valium and Dilaudid 0.5 mg IV x 1 given.  Pt later on in the am feeling better legs not feeling heavy when spasms are under control.    ** Pt is to RESTART PLAVIX on 10/26/22      MEDICATIONS  (STANDING):  aspirin  chewable 81 milliGRAM(s) Oral daily  atorvastatin 40 milliGRAM(s) Oral at bedtime  benzocaine 15 mG/menthol 3.6 mG Lozenge 1 Lozenge Oral every 3 hours  bisacodyl Suppository 10 milliGRAM(s) Rectal once  diazepam  Injectable 2.5 milliGRAM(s) IV Push once  escitalopram 20 milliGRAM(s) Oral daily  finasteride 5 milliGRAM(s) Oral daily  folic acid 1 milliGRAM(s) Oral daily  glucagon  Injectable 1 milliGRAM(s) IntraMuscular once  heparin   Injectable 5000 Unit(s) SubCutaneous every 12 hours  insulin glargine Injectable (LANTUS) 15 Unit(s) SubCutaneous at bedtime  insulin lispro (ADMELOG) corrective regimen sliding scale   SubCutaneous at bedtime  insulin lispro (ADMELOG) corrective regimen sliding scale   SubCutaneous three times a day before meals  lactulose Syrup 20 Gram(s) Oral two times a day  levothyroxine 137 MICROGram(s) Oral daily  losartan 100 milliGRAM(s) Oral daily  methocarbamol 750 milliGRAM(s) Oral every 8 hours  multivitamin 1 Tablet(s) Oral daily  pantoprazole    Tablet 40 milliGRAM(s) Oral before breakfast  polyethylene glycol 3350 17 Gram(s) Oral two times a day  propranolol 80 milliGRAM(s) Oral every 12 hours  senna 2 Tablet(s) Oral at bedtime  tamsulosin 0.4 milliGRAM(s) Oral at bedtime     Vital Signs Last 24 Hrs  T(C): 36.8 (24 Oct 2022 00:00), Max: 36.8 (24 Oct 2022 00:00)  T(F): 98.2 (24 Oct 2022 00:00), Max: 98.2 (24 Oct 2022 00:00)  HR: 89 (24 Oct 2022 00:00) (61 - 89)  BP: 153/90 (24 Oct 2022 00:00) (116/61 - 153/90)  BP(mean): --  RR: 18 (24 Oct 2022 00:00) (18 - 18)  SpO2: 98% (24 Oct 2022 00:00) (96% - 98%)    Parameters below as of 24 Oct 2022 00:00  Patient On (Oxygen Delivery Method): room air        Labs:                        8.8    9.69  )-----------( 150      ( 22 Oct 2022 09:21 )             29.2     10-22    138  |  105  |  13  ----------------------------<  82  3.5   |  29  |  0.68    Ca    8.7      22 Oct 2022 09:21    CT Cspine: unofficial but verbal read via Dr. Cole:  No cord compression.  Post epidural ligamentum / Stissue element but no severe compression of cervical spinal cord    Physical Exam:  Constitutional: Awake / alert  HEENT: PERRLA, EOMI  Neck: Supple  Respiratory: Breath sounds are clear bilaterally  Cardiovascular: S1 and S2, regular rhythm  Gastrointestinal: Soft, NT/ND  Extremities:  no edema  Vascular: No carotid Bruit  Musculoskeletal: no joint swelling/tenderness, no abnormal movements  Skin: No rashes  Incisin: c/d/i.  No organized hematoma.  + paraspinal muscle spasm scapula noted    Neurological Exam:  HF: A x O x 3, appropriately interactive, normal affect, speech fluent, no aphasia or paraphasic errors. Naming /repetition intact   CN: PERRL, EOMI, VFF, facial sensation normal, no NLFD, tongue midline  UE 5/5 bilaterally with good hand control / rapid alt movements  Motor: MARIANO well but pt however claims his LE's feel heaver Proximally 4++/5 / Distally 5/5.   UE 5/5. No clonus or hyper-reflexia noted  Sens: Intact to light touch  Reflexes: Symmetric and normal, downgoing toes b/l  Coord:  No FNFA, dysmetria, VISH intact   Gait/Balance: Cannot test    A/P:  72 year old male POD#5 C3-6 Laminoplasty    Plan:  Cardiology & Hospitalist input appreciated  current pain regimen appears to be innefective still  CT Cspine reviewed with radiologist on call Dr. Cole  d/w in detail pt's status with Dr. Keller  Will try Valium 2.5mg PO q6  Lidoderm patches  Added IV tylenol yesterday with good result.  soft collar for comfort  encouraged patient to participate w PT & OOB w assist   on heparin SQ for DVT prophylaxis and Baby ASA  incentive spirometry  d/w Dr Keller

## 2022-10-24 NOTE — PROVIDER CONTACT NOTE (OTHER) - SITUATION
Pt was screaming in pain demanding IV pain medication to be given right away.
Pt ambulated to bathroom, pt became agitated. Pt shaking and stating he is in pain. Pt disoriented to place and time. Given IV tylenol but demanding to see the doctor.

## 2022-10-24 NOTE — PROVIDER CONTACT NOTE (OTHER) - ASSESSMENT
Robaxin , IV Toradol and  2 mg IV morphine given to pt all within 20 minutes because pt was demanding nothing was working for his neck pain .Pt started to get verbally abusive and called 911.Surgical incision/ dressing looked c/d/i.

## 2022-10-24 NOTE — PROVIDER CONTACT NOTE (OTHER) - BACKGROUND
Pt baseline A&OX4
Pt came in for elective C3-C6 Laminoplasty on 10/19.Pt neuro checks have had no change. Pt has been OOB walking with assist. Sitting up in chair for 2-3 hours. Neurosurgery last saw pt in AM.

## 2022-10-24 NOTE — PROGRESS NOTE ADULT - SUBJECTIVE AND OBJECTIVE BOX
PCP:  Dr Beka Abarca    c/c: LUE numbness    HPI:  72M,  pmh of HTN, HLD, CAD/MI X 2, CABG april 2022, multiple cardiac stents/balloon angioplasties, DMII,  bell's palsy, BPH, OA,  thyroid Ca, S/P thyroidectomy, PUD, depression and spinal stenosis with cc of numbness to left arm and hand, Now on 2 north S/P planned C3-7 laminoplasty.   Hospitalist service consulted for medical comanagement.     events from overnight noted.  Pt seen and examined this am. more comfortable this morning. . No sob/chest pain. tolerating po. No difficulty voiding.     ROS: all 10 systems reviewed and is as above otherwise negative.           PHYSICAL EXAM:  GENERAL: Comfortable, no acute distress   HEAD:  Normocephalic, atraumatic  EYES: EOMI, PERRLA  HEENT: Moist mucous membranes  NECK: Supple, No JVD  NERVOUS SYSTEM:  Alert & Oriented X3, Motor Strength 5/5 B/L upper and lower extremities  CHEST/LUNG: Clear to auscultation bilaterally  HEART: Regular rate and rhythm  ABDOMEN: Soft, non tender, distended, Bowel sounds present  GENITOURINARY: Voiding, no palpable bladder  EXTREMITIES:   No clubbing, cyanosis, or edema  MUSCULOSKELETAL- c spine posterior neck dsg c/d/i  SKIN-no rash    LABS:                        8.8    9.69  )-----------( 150      ( 22 Oct 2022 09:21 )             29.2     10-22    138  |  105  |  13  ----------------------------<  82  3.5   |  29  |  0.68    Ca    8.7      22 Oct 2022 09:21        MEDICATIONS  (STANDING):  acetaminophen   IVPB .. 1000 milliGRAM(s) IV Intermittent once  aspirin  chewable 81 milliGRAM(s) Oral daily  atorvastatin 40 milliGRAM(s) Oral at bedtime  benzocaine 15 mG/menthol 3.6 mG Lozenge 1 Lozenge Oral every 3 hours  bisacodyl Suppository 10 milliGRAM(s) Rectal once  escitalopram 20 milliGRAM(s) Oral daily  finasteride 5 milliGRAM(s) Oral daily  folic acid 1 milliGRAM(s) Oral daily  glucagon  Injectable 1 milliGRAM(s) IntraMuscular once  heparin   Injectable 5000 Unit(s) SubCutaneous every 12 hours  insulin glargine Injectable (LANTUS) 15 Unit(s) SubCutaneous at bedtime  insulin lispro (ADMELOG) corrective regimen sliding scale   SubCutaneous three times a day before meals  insulin lispro (ADMELOG) corrective regimen sliding scale   SubCutaneous at bedtime  lactulose Syrup 20 Gram(s) Oral two times a day  levothyroxine 137 MICROGram(s) Oral daily  losartan 100 milliGRAM(s) Oral daily  methocarbamol 750 milliGRAM(s) Oral every 8 hours  multivitamin 1 Tablet(s) Oral daily  pantoprazole    Tablet 40 milliGRAM(s) Oral before breakfast  polyethylene glycol 3350 17 Gram(s) Oral two times a day  propranolol 80 milliGRAM(s) Oral every 12 hours  senna 2 Tablet(s) Oral at bedtime  tamsulosin 0.4 milliGRAM(s) Oral at bedtime    MEDICATIONS  (PRN):  bisacodyl 5 milliGRAM(s) Oral every 12 hours PRN Constipation  dextrose Oral Gel 15 Gram(s) Oral once PRN Blood Glucose LESS THAN 70 milliGRAM(s)/deciliter  famotidine    Tablet 20 milliGRAM(s) Oral every 12 hours PRN Dyspepsia  hydrALAZINE 10 milliGRAM(s) Oral every 4 hours PRN Systolic blood pressure >160  ketorolac   Injectable 30 milliGRAM(s) IV Push every 6 hours PRN Severe Pain (7 - 10)  morphine  - Injectable 2 milliGRAM(s) IV Push every 4 hours PRN breakthrough pain  naloxone Injectable 0.1 milliGRAM(s) IV Push every 3 minutes PRN For ANY of the following changes in patient status:  A. RR LESS THAN 10 breaths per minute, B. Oxygen saturation LESS THAN 90%, C. Sedation score of 6  ondansetron Injectable 4 milliGRAM(s) IV Push every 6 hours PRN Nausea and/or Vomiting  oxyCODONE    IR 5 milliGRAM(s) Oral every 4 hours PRN Moderate Pain (4 - 6)  oxyCODONE    IR 10 milliGRAM(s) Oral every 4 hours PRN Severe Pain (7 - 10)    ASSESSMENT AND PLAN:  71 yo male with above pmh a/w:    # cervical spine stenosis/myelopathy s/p C3-7 laminoplasty   -pain control.   -physical therapy.   -increased miralax to bid, started lactulose,  continue senna, and dulcolax for constipation.   -incentive spirometry.     # Iron deficiency anemia with acute blood loss anemia related to surgery  -monitor h/h    #CAD with h/o CABG x 3, multi stents  -continue asa/statin.     #HTN  -likely exacerbated by pain.   -Cont losartan, propranolol    #depression  cont lexapro    #hypothyroidism  cont levothyroxine    #DM type 2  -on lantus/ss    # H/O GERD/PUD  PPI    #BPH  - finesteride/tamsulosin    #VTE prophylaxis  hep sq  venodynes  amb with PT         PCP:  Dr Beka Abarca    c/c: LUE numbness    HPI:  72M,  pmh of HTN, HLD, CAD/MI X 2, CABG april 2022, multiple cardiac stents/balloon angioplasties, DMII,  bell's palsy, BPH, OA,  thyroid Ca, S/P thyroidectomy, PUD, depression and spinal stenosis with cc of numbness to left arm and hand, Now on 2 north S/P planned C3-7 laminoplasty.   Hospitalist service consulted for medical comanagement.     events from overnight noted.  Pt seen and examined this am. more comfortable this morning. . No sob/chest pain. tolerating po. No difficulty voiding.     ROS: all 10 systems reviewed and is as above otherwise negative.     PHYSICAL EXAM:  GENERAL: Comfortable, no acute distress   HEAD:  Normocephalic, atraumatic  EYES: EOMI, PERRLA  HEENT: Moist mucous membranes  NECK: Supple, No JVD  NERVOUS SYSTEM:  Alert & Oriented X3, Motor Strength 5/5 B/L upper and lower extremities  CHEST/LUNG: Clear to auscultation bilaterally  HEART: Regular rate and rhythm  ABDOMEN: Soft, non tender, distended, Bowel sounds present  GENITOURINARY: Voiding, no palpable bladder  EXTREMITIES:   No clubbing, cyanosis, or edema  MUSCULOSKELETAL- c spine posterior neck dsg c/d/i  SKIN-no rash    LABS:                                       9.0    7.31  )-----------( 208      ( 24 Oct 2022 08:41 )             30.1       10-24    136  |  104  |  16  ----------------------------<  99  4.0   |  29  |  0.81    Ca    8.8      24 Oct 2022 08:41  Phos  1.1     10-24  Mg     2.1     10-24      MEDICATIONS  (STANDING):  aspirin  chewable 81 milliGRAM(s) Oral daily  atorvastatin 40 milliGRAM(s) Oral at bedtime  benzocaine 15 mG/menthol 3.6 mG Lozenge 1 Lozenge Oral every 3 hours  bisacodyl Suppository 10 milliGRAM(s) Rectal once  diazepam    Tablet 2.5 milliGRAM(s) Oral every 6 hours  escitalopram 20 milliGRAM(s) Oral daily  finasteride 5 milliGRAM(s) Oral daily  folic acid 1 milliGRAM(s) Oral daily  glucagon  Injectable 1 milliGRAM(s) IntraMuscular once  heparin   Injectable 5000 Unit(s) SubCutaneous every 12 hours  insulin glargine Injectable (LANTUS) 15 Unit(s) SubCutaneous at bedtime  insulin lispro (ADMELOG) corrective regimen sliding scale   SubCutaneous at bedtime  insulin lispro (ADMELOG) corrective regimen sliding scale   SubCutaneous three times a day before meals  lactulose Syrup 20 Gram(s) Oral two times a day  levothyroxine 137 MICROGram(s) Oral daily  losartan 100 milliGRAM(s) Oral daily  methocarbamol 750 milliGRAM(s) Oral every 8 hours  multivitamin 1 Tablet(s) Oral daily  pantoprazole    Tablet 40 milliGRAM(s) Oral before breakfast  polyethylene glycol 3350 17 Gram(s) Oral two times a day  propranolol 80 milliGRAM(s) Oral every 12 hours  senna 2 Tablet(s) Oral at bedtime  tamsulosin 0.4 milliGRAM(s) Oral at bedtime    MEDICATIONS  (PRN):  bisacodyl 5 milliGRAM(s) Oral every 12 hours PRN Constipation  dextrose Oral Gel 15 Gram(s) Oral once PRN Blood Glucose LESS THAN 70 milliGRAM(s)/deciliter  famotidine    Tablet 20 milliGRAM(s) Oral every 12 hours PRN Dyspepsia  hydrALAZINE 10 milliGRAM(s) Oral every 4 hours PRN Systolic blood pressure >160  lidocaine   4% Patch 2 Patch Transdermal daily PRN apply to scapula shoulder lateral to incision x 2  naloxone Injectable 0.1 milliGRAM(s) IV Push every 3 minutes PRN For ANY of the following changes in patient status:  A. RR LESS THAN 10 breaths per minute, B. Oxygen saturation LESS THAN 90%, C. Sedation score of 6  ondansetron Injectable 4 milliGRAM(s) IV Push every 6 hours PRN Nausea and/or Vomiting  oxyCODONE    IR 15 milliGRAM(s) Oral every 4 hours PRN Severe Pain (7 - 10)      ASSESSMENT AND PLAN:  73 yo male with above pmh a/w:    # cervical spine stenosis/myelopathy s/p C3-7 laminoplasty   -pain control.   -physical therapy.   -increased miralax to bid,  lactulose,  continue senna, and dulcolax for constipation.   -incentive spirometry.     # Iron deficiency anemia with acute blood loss anemia related to surgery  -monitor h/h    #CAD with h/o CABG x 3, multi stents  -continue asa/statin.     #HTN  -likely exacerbated by pain.   -Cont losartan, propranolol    #depression  cont lexapro    #hypothyroidism  cont levothyroxine    #DM type 2  -on lantus/ss    # H/O GERD/PUD  PPI    #BPH  - finesteride/tamsulosin    #VTE prophylaxis  hep sq  venodynes  amb with PT    dispo: home today

## 2022-10-24 NOTE — PROVIDER CONTACT NOTE (OTHER) - ACTION/TREATMENT ORDERED:
Surgery came up to see patient. Ordered 0.5mg IV Dilaudid and Urgent CT ordered.
PA ordered 1x dose of 0.5mg dilaudid subq and 750mg robaxin

## 2022-10-24 NOTE — PROGRESS NOTE ADULT - REASON FOR ADMISSION
postop cervical
postop cervical
POD#2 C3-6 laminoplasty
cervical stenosis   POD#1 C3-6 Laminoplasty
cervical radiculopathy

## 2022-10-25 ENCOUNTER — TRANSCRIPTION ENCOUNTER (OUTPATIENT)
Age: 72
End: 2022-10-25

## 2022-10-25 VITALS
TEMPERATURE: 98 F | OXYGEN SATURATION: 95 % | RESPIRATION RATE: 16 BRPM | HEART RATE: 70 BPM | SYSTOLIC BLOOD PRESSURE: 183 MMHG | DIASTOLIC BLOOD PRESSURE: 77 MMHG

## 2022-10-25 PROBLEM — G51.0 BELL'S PALSY: Chronic | Status: ACTIVE | Noted: 2022-10-11

## 2022-10-25 PROBLEM — A41.89 OTHER SPECIFIED SEPSIS: Chronic | Status: ACTIVE | Noted: 2022-10-11

## 2022-10-25 PROBLEM — K80.50 CALCULUS OF BILE DUCT WITHOUT CHOLANGITIS OR CHOLECYSTITIS WITHOUT OBSTRUCTION: Chronic | Status: ACTIVE | Noted: 2022-10-11

## 2022-10-25 PROBLEM — N40.0 BENIGN PROSTATIC HYPERPLASIA WITHOUT LOWER URINARY TRACT SYMPTOMS: Chronic | Status: ACTIVE | Noted: 2022-10-11

## 2022-10-25 PROBLEM — E21.3 HYPERPARATHYROIDISM, UNSPECIFIED: Chronic | Status: ACTIVE | Noted: 2022-10-11

## 2022-10-25 PROBLEM — M19.90 UNSPECIFIED OSTEOARTHRITIS, UNSPECIFIED SITE: Chronic | Status: ACTIVE | Noted: 2022-10-11

## 2022-10-25 PROBLEM — I21.9 ACUTE MYOCARDIAL INFARCTION, UNSPECIFIED: Chronic | Status: ACTIVE | Noted: 2022-04-08

## 2022-10-25 PROBLEM — Z98.890 OTHER SPECIFIED POSTPROCEDURAL STATES: Chronic | Status: ACTIVE | Noted: 2022-10-11

## 2022-10-25 PROBLEM — C44.90 UNSPECIFIED MALIGNANT NEOPLASM OF SKIN, UNSPECIFIED: Chronic | Status: ACTIVE | Noted: 2022-10-11

## 2022-10-25 PROBLEM — Z87.19 PERSONAL HISTORY OF OTHER DISEASES OF THE DIGESTIVE SYSTEM: Chronic | Status: ACTIVE | Noted: 2022-04-08

## 2022-10-25 PROBLEM — E03.9 HYPOTHYROIDISM, UNSPECIFIED: Chronic | Status: ACTIVE | Noted: 2022-10-11

## 2022-10-25 PROBLEM — I35.1 NONRHEUMATIC AORTIC (VALVE) INSUFFICIENCY: Chronic | Status: ACTIVE | Noted: 2022-10-11

## 2022-10-25 PROBLEM — C73 MALIGNANT NEOPLASM OF THYROID GLAND: Chronic | Status: ACTIVE | Noted: 2022-04-08

## 2022-10-25 PROBLEM — R80.9 PROTEINURIA, UNSPECIFIED: Chronic | Status: ACTIVE | Noted: 2022-10-11

## 2022-10-25 PROBLEM — M48.00 SPINAL STENOSIS, SITE UNSPECIFIED: Chronic | Status: ACTIVE | Noted: 2022-04-08

## 2022-10-25 PROBLEM — N52.9 MALE ERECTILE DYSFUNCTION, UNSPECIFIED: Chronic | Status: ACTIVE | Noted: 2022-10-11

## 2022-10-25 PROBLEM — F41.9 ANXIETY DISORDER, UNSPECIFIED: Chronic | Status: ACTIVE | Noted: 2022-10-11

## 2022-10-25 PROBLEM — I25.10 ATHEROSCLEROTIC HEART DISEASE OF NATIVE CORONARY ARTERY WITHOUT ANGINA PECTORIS: Chronic | Status: ACTIVE | Noted: 2022-10-11

## 2022-10-25 PROCEDURE — 99024 POSTOP FOLLOW-UP VISIT: CPT

## 2022-10-25 RX ORDER — ACETAMINOPHEN 500 MG
2 TABLET ORAL
Qty: 100 | Refills: 0
Start: 2022-10-25

## 2022-10-25 RX ORDER — DIAZEPAM 5 MG
1 TABLET ORAL
Qty: 30 | Refills: 0
Start: 2022-10-25

## 2022-10-25 RX ORDER — DIAZEPAM 5 MG
2.5 TABLET ORAL
Qty: 30 | Refills: 0
Start: 2022-10-25

## 2022-10-25 RX ADMIN — Medication 81 MILLIGRAM(S): at 09:41

## 2022-10-25 RX ADMIN — FINASTERIDE 5 MILLIGRAM(S): 5 TABLET, FILM COATED ORAL at 09:40

## 2022-10-25 RX ADMIN — LOSARTAN POTASSIUM 100 MILLIGRAM(S): 100 TABLET, FILM COATED ORAL at 09:41

## 2022-10-25 RX ADMIN — Medication 5 MILLIGRAM(S): at 10:15

## 2022-10-25 RX ADMIN — OXYCODONE HYDROCHLORIDE 10 MILLIGRAM(S): 5 TABLET ORAL at 01:40

## 2022-10-25 RX ADMIN — OXYCODONE HYDROCHLORIDE 10 MILLIGRAM(S): 5 TABLET ORAL at 01:10

## 2022-10-25 RX ADMIN — OXYCODONE HYDROCHLORIDE 10 MILLIGRAM(S): 5 TABLET ORAL at 09:20

## 2022-10-25 RX ADMIN — Medication 2.5 MILLIGRAM(S): at 05:43

## 2022-10-25 RX ADMIN — POLYETHYLENE GLYCOL 3350 17 GRAM(S): 17 POWDER, FOR SOLUTION ORAL at 09:40

## 2022-10-25 RX ADMIN — HEPARIN SODIUM 5000 UNIT(S): 5000 INJECTION INTRAVENOUS; SUBCUTANEOUS at 09:41

## 2022-10-25 RX ADMIN — PANTOPRAZOLE SODIUM 40 MILLIGRAM(S): 20 TABLET, DELAYED RELEASE ORAL at 05:43

## 2022-10-25 RX ADMIN — Medication 1 MILLIGRAM(S): at 09:40

## 2022-10-25 RX ADMIN — OXYCODONE HYDROCHLORIDE 10 MILLIGRAM(S): 5 TABLET ORAL at 08:30

## 2022-10-25 RX ADMIN — ESCITALOPRAM OXALATE 20 MILLIGRAM(S): 10 TABLET, FILM COATED ORAL at 09:40

## 2022-10-25 RX ADMIN — Medication 137 MICROGRAM(S): at 05:44

## 2022-10-25 NOTE — PROGRESS NOTE ADULT - PROVIDER SPECIALTY LIST ADULT
Hospitalist
Neurosurgery
Hospitalist
Neurosurgery
Cardiology

## 2022-10-25 NOTE — DISCHARGE NOTE NURSING/CASE MANAGEMENT/SOCIAL WORK - PATIENT PORTAL LINK FT
You can access the FollowMyHealth Patient Portal offered by [facility] by registering at the following website: [email or web address]. By joining [email or web address]’s FollowMyHealth portal, you will also be able to view your health information using other applications (apps) compatible with our system.

## 2022-10-25 NOTE — PROGRESS NOTE ADULT - SUBJECTIVE AND OBJECTIVE BOX
Neurosurgery:    72 year old man with a cardiac history significant for severe CAD, MI, remote coronary stents, recent CABG x 3 (4/2022), HTN, HLD, normal LV function, who was admitted on 10/19/22 for elective C3-6 Laminoplasty from the left post cervical spine. Postoperative had increased blood pressure over night due to pain    10/20 POD#1 C3-6 Laminoplasty, cardiology consulted for blood pressure management, PCA ordered, voiding spontaneously overnight after some retention (figueroa never placed) Hospitalist also consulted to follow along    10/21 POD#2 C3-6 Laminoplasty, feels much better this morning, AOx3, states he had muscle spasm relief with robaxin overnight    10/22 POD#3 3-6 laminoplasty no events overnight, robaxin has been helping per patient. Oxy 10mg for Severe pain rx'd w/ Morphine breakthrough.  Awaits BM    10/23 POD#4 C3-6 laminoplasty.  Intend for discharge later today.  Paperwork complete.  PLAVIX to restart on 10/26.    10/24 POD#5 C3-6 laminoplaty.  Pt with severe spasms night.  uncomfortable rambunxious with nursing staff.  c/o burning pain neck area and when asked he said his legs feel heavy.   After medication admins to releive pain pt still c/o severe neck pain so CT of Cspine completed showing no epidural hematoma.  + post flavum as expected from laminoplasty at decompressed region but no demonstration of cord compression.  IV Valium and Dilaudid 0.5 mg IV x 1 given.  Pt later on in the am feeling better legs not feeling heavy when spasms are under control.    ** Pt is to RESTART PLAVIX on 10/26/22    10/25- Pt seen and examined this morning and d/w dr. Sharp pt is awake and alert and comfortable, states his pain is controlled and would like to go home. No overnight events.   Pt has not had BM yet but states he is passing gas well and tolerating PO well and voiding. Pt ambulating well.   D/W pt's wife advised of plan for d/c home, pt has had periods of intermittent confusion while on pain medication, pt's wife advised to monitor at home and limit pain medication as tolerated to improve confusion periods. pt to continue bowel regimen and f/u with Dr. Sharp in the office next week.     MEDICATIONS  (STANDING):  aspirin  chewable 81 milliGRAM(s) Oral daily  atorvastatin 40 milliGRAM(s) Oral at bedtime  benzocaine 15 mG/menthol 3.6 mG Lozenge 1 Lozenge Oral every 3 hours  bisacodyl Suppository 10 milliGRAM(s) Rectal once  diazepam    Tablet 2.5 milliGRAM(s) Oral every 6 hours  escitalopram 20 milliGRAM(s) Oral daily  finasteride 5 milliGRAM(s) Oral daily  folic acid 1 milliGRAM(s) Oral daily  glucagon  Injectable 1 milliGRAM(s) IntraMuscular once  heparin   Injectable 5000 Unit(s) SubCutaneous every 12 hours  insulin glargine Injectable (LANTUS) 15 Unit(s) SubCutaneous at bedtime  insulin lispro (ADMELOG) corrective regimen sliding scale   SubCutaneous three times a day before meals  insulin lispro (ADMELOG) corrective regimen sliding scale   SubCutaneous at bedtime  lactulose Syrup 20 Gram(s) Oral two times a day  levothyroxine 137 MICROGram(s) Oral daily  losartan 100 milliGRAM(s) Oral daily  multivitamin 1 Tablet(s) Oral daily  pantoprazole    Tablet 40 milliGRAM(s) Oral before breakfast  polyethylene glycol 3350 17 Gram(s) Oral two times a day  propranolol 80 milliGRAM(s) Oral every 12 hours  senna 2 Tablet(s) Oral at bedtime  tamsulosin 0.4 milliGRAM(s) Oral at bedtime    MEDICATIONS  (PRN):  acetaminophen   IVPB .. 1000 milliGRAM(s) IV Intermittent once PRN Mild Pain (1 - 3)  bisacodyl 5 milliGRAM(s) Oral every 12 hours PRN Constipation  dextrose Oral Gel 15 Gram(s) Oral once PRN Blood Glucose LESS THAN 70 milliGRAM(s)/deciliter  famotidine    Tablet 20 milliGRAM(s) Oral every 12 hours PRN Dyspepsia  hydrALAZINE 10 milliGRAM(s) Oral every 4 hours PRN Systolic blood pressure >160  lidocaine   4% Patch 2 Patch Transdermal daily PRN apply to scapula shoulder lateral to incision x 2  naloxone Injectable 0.1 milliGRAM(s) IV Push every 3 minutes PRN For ANY of the following changes in patient status:  A. RR LESS THAN 10 breaths per minute, B. Oxygen saturation LESS THAN 90%, C. Sedation score of 6  ondansetron Injectable 4 milliGRAM(s) IV Push every 6 hours PRN Nausea and/or Vomiting  oxyCODONE    IR 10 milliGRAM(s) Oral every 4 hours PRN Severe Pain (7 - 10)      ICU Vital Signs Last 24 Hrs  T(C): 36.9 (25 Oct 2022 09:40), Max: 37.2 (24 Oct 2022 20:12)  T(F): 98.5 (25 Oct 2022 09:40), Max: 99 (24 Oct 2022 20:12)  HR: 70 (25 Oct 2022 09:40) (67 - 72)  BP: 183/77 (25 Oct 2022 09:40) (141/52 - 183/77)  BP(mean): --  ABP: --  ABP(mean): --  RR: 16 (25 Oct 2022 09:40) (16 - 18)  SpO2: 95% (25 Oct 2022 09:40) (95% - 98%)    O2 Parameters below as of 25 Oct 2022 09:40  Patient On (Oxygen Delivery Method): room air            Labs:                        8.8    9.69  )-----------( 150      ( 22 Oct 2022 09:21 )             29.2     10-22    138  |  105  |  13  ----------------------------<  82  3.5   |  29  |  0.68    Ca    8.7      22 Oct 2022 09:21    CT Cspine: unofficial but verbal read via Dr. Cole:  No cord compression.  Post epidural ligamentum / Stissue element but no severe compression of cervical spinal cord    Physical Exam:  Constitutional: Awake / alert  HEENT: PERRLA, EOMI  Neck: Supple  Respiratory: Breath sounds are clear bilaterally  Cardiovascular: S1 and S2, regular rhythm  Gastrointestinal: Soft, NT/ND, + BS   Extremities:  no edema, no calf tenderness to palp   Musculoskeletal: no joint swelling/tenderness, no abnormal movements  Skin: No rashes  Incisin: c/d/i.  No organized hematoma., no drainage or swelling noted steri strips intact c/d    Neurological Exam:  HF: A x O x 3, appropriately interactive, normal affect, speech fluent, no aphasia or paraphasic errors. Naming /repetition intact   CN: PERRL, EOMI, VFF, facial sensation normal, no NLFD, tongue midline  UE 5/5 bilaterally with good hand control / rapid alt movements  Motor: MARIANO well but pt however claims his LE's feel heaver Proximally 4++/5 / Distally 5/5.   UE 5/5. No clonus or hyper-reflexia noted  Sens: Intact to light touch  Coord:  No FNFA, dysmetria, VISH intact   Gait/Balance: Cannot test    A/P:  72 year old male POD#6 C3-6 Laminoplasty    Plan:  Cardiology & Hospitalist input appreciated  current pain regimen appears to be effective   CT Cspine reviewed with radiologist on call Dr. Cole  d/w in detail pt's status with Dr. Sharp  soft collar for comfort  encouraged patient to participate w PT & OOB w assist   on heparin SQ for DVT prophylaxis and Baby ASA, pt to restart Plavix on 10/26   incentive spirometry  d/w Dr Sharp advised to d/c pt home on current pain regimen and f/u as outpt   d/w pt's wife, pt appears comfortable on current regimen alert and oriented x3 and neurologically intact, pt to go home advised to limit pain medication as needed for pain but to be aware may cause confusion and pt to not be left alone, drive or operate machinery or go on ladders,   wife advised to keep incision clean and dry, pt may shower but to no soak incision and allow steri strips to fall off on their own and do not apply any ointments or creams to site. Pt to also continue bowel regimen at home and notify Dr if no BM in 1 day   Pt to follow with Dr. sharp as outpt

## 2022-10-25 NOTE — DISCHARGE NOTE NURSING/CASE MANAGEMENT/SOCIAL WORK - NSDCPEFALRISK_GEN_ALL_CORE
For information on Fall & Injury Prevention, visit: [email or web address] OR  [email or web address] OR  https://www.cdc.gov/steadi/patient.html

## 2022-10-27 DIAGNOSIS — Z95.1 PRESENCE OF AORTOCORONARY BYPASS GRAFT: ICD-10-CM

## 2022-10-27 DIAGNOSIS — M48.02 SPINAL STENOSIS, CERVICAL REGION: ICD-10-CM

## 2022-10-27 DIAGNOSIS — F41.8 OTHER SPECIFIED ANXIETY DISORDERS: ICD-10-CM

## 2022-10-27 DIAGNOSIS — E78.5 HYPERLIPIDEMIA, UNSPECIFIED: ICD-10-CM

## 2022-10-27 DIAGNOSIS — M17.9 OSTEOARTHRITIS OF KNEE, UNSPECIFIED: ICD-10-CM

## 2022-10-27 DIAGNOSIS — N40.0 BENIGN PROSTATIC HYPERPLASIA WITHOUT LOWER URINARY TRACT SYMPTOMS: ICD-10-CM

## 2022-10-27 DIAGNOSIS — E03.9 HYPOTHYROIDISM, UNSPECIFIED: ICD-10-CM

## 2022-10-27 DIAGNOSIS — M62.838 OTHER MUSCLE SPASM: ICD-10-CM

## 2022-10-27 DIAGNOSIS — Z85.850 PERSONAL HISTORY OF MALIGNANT NEOPLASM OF THYROID: ICD-10-CM

## 2022-10-27 DIAGNOSIS — K59.00 CONSTIPATION, UNSPECIFIED: ICD-10-CM

## 2022-10-27 DIAGNOSIS — R25.1 TREMOR, UNSPECIFIED: ICD-10-CM

## 2022-10-27 DIAGNOSIS — Z85.828 PERSONAL HISTORY OF OTHER MALIGNANT NEOPLASM OF SKIN: ICD-10-CM

## 2022-10-27 DIAGNOSIS — Z79.4 LONG TERM (CURRENT) USE OF INSULIN: ICD-10-CM

## 2022-10-27 DIAGNOSIS — Z86.19 PERSONAL HISTORY OF OTHER INFECTIOUS AND PARASITIC DISEASES: ICD-10-CM

## 2022-10-27 DIAGNOSIS — G99.2 MYELOPATHY IN DISEASES CLASSIFIED ELSEWHERE: ICD-10-CM

## 2022-10-27 DIAGNOSIS — Z98.84 BARIATRIC SURGERY STATUS: ICD-10-CM

## 2022-10-27 DIAGNOSIS — D50.9 IRON DEFICIENCY ANEMIA, UNSPECIFIED: ICD-10-CM

## 2022-10-27 DIAGNOSIS — D62 ACUTE POSTHEMORRHAGIC ANEMIA: ICD-10-CM

## 2022-10-27 DIAGNOSIS — Z95.5 PRESENCE OF CORONARY ANGIOPLASTY IMPLANT AND GRAFT: ICD-10-CM

## 2022-10-27 DIAGNOSIS — K27.9 PEPTIC ULCER, SITE UNSPECIFIED, UNSPECIFIED AS ACUTE OR CHRONIC, WITHOUT HEMORRHAGE OR PERFORATION: ICD-10-CM

## 2022-10-27 DIAGNOSIS — D72.829 ELEVATED WHITE BLOOD CELL COUNT, UNSPECIFIED: ICD-10-CM

## 2022-10-27 DIAGNOSIS — I35.1 NONRHEUMATIC AORTIC (VALVE) INSUFFICIENCY: ICD-10-CM

## 2022-10-27 DIAGNOSIS — I25.2 OLD MYOCARDIAL INFARCTION: ICD-10-CM

## 2022-10-27 DIAGNOSIS — I25.10 ATHEROSCLEROTIC HEART DISEASE OF NATIVE CORONARY ARTERY WITHOUT ANGINA PECTORIS: ICD-10-CM

## 2022-10-27 DIAGNOSIS — M54.12 RADICULOPATHY, CERVICAL REGION: ICD-10-CM

## 2022-10-27 DIAGNOSIS — Z79.84 LONG TERM (CURRENT) USE OF ORAL HYPOGLYCEMIC DRUGS: ICD-10-CM

## 2022-10-27 DIAGNOSIS — E11.9 TYPE 2 DIABETES MELLITUS WITHOUT COMPLICATIONS: ICD-10-CM

## 2022-10-27 DIAGNOSIS — I10 ESSENTIAL (PRIMARY) HYPERTENSION: ICD-10-CM

## 2022-10-27 DIAGNOSIS — Z79.82 LONG TERM (CURRENT) USE OF ASPIRIN: ICD-10-CM

## 2022-10-27 DIAGNOSIS — Z87.891 PERSONAL HISTORY OF NICOTINE DEPENDENCE: ICD-10-CM

## 2022-10-27 DIAGNOSIS — E21.3 HYPERPARATHYROIDISM, UNSPECIFIED: ICD-10-CM

## 2022-10-27 DIAGNOSIS — G95.89 OTHER SPECIFIED DISEASES OF SPINAL CORD: ICD-10-CM

## 2022-10-28 ENCOUNTER — NON-APPOINTMENT (OUTPATIENT)
Age: 72
End: 2022-10-28

## 2022-11-02 ENCOUNTER — APPOINTMENT (OUTPATIENT)
Dept: NEUROSURGERY | Facility: CLINIC | Age: 72
End: 2022-11-02

## 2022-11-02 VITALS
DIASTOLIC BLOOD PRESSURE: 56 MMHG | OXYGEN SATURATION: 98 % | SYSTOLIC BLOOD PRESSURE: 92 MMHG | TEMPERATURE: 97.7 F | HEART RATE: 84 BPM

## 2022-11-02 PROCEDURE — 99024 POSTOP FOLLOW-UP VISIT: CPT

## 2022-11-02 NOTE — CONSULT LETTER
[Dear  ___] : Dear  [unfilled], [Courtesy Letter:] : I had the pleasure of seeing your patient, [unfilled], in my office today. [Sincerely,] : Sincerely, [FreeTextEntry2] : Beka Abarca MD [address] [place], [place] [id number]  [FreeTextEntry1] : Mr. Olvera is a very pleasant 72-year-old male patient who was seen in our office today approximately 2 weeks following a cervical laminoplasty.\par \par The patient's postoperative course was complicated by severe pain and difficulty with pain management.  The patient was sent home with several medications including oxycodone, methocarbamol, Valium, and tamsulosin for urinary retention.  The patient was provided additionally provided gabapentin by his pain management doctor at an outpatient follow-up after surgery.  I am happy to report that the patient is currently improving with regards to his pain.  The patient's urinary retention has resolved completely.  The patient's wife has been managing his medications and has stopped Valium and tamsulosin completely.  The patient is currently taking oxycodone as needed, methocarbamol as needed, and gabapentin for pain.  The patient's pain is localized on the left which is worse with rotational movements towards the left side.  The patient does not have axial neck pain but rather pain in the region of the scalene muscles on the left.  The patient's neurologic symptoms have improved and the patient states that he is walking significantly better since his surgery.  Yesterday, the patient had an episode of fatigue which was self-limiting.  The patient appears alert today.\par \par On examination, the patient is alert, oriented, and compliant with the exam.  The patient demonstrates full strength in the upper and lower extremities bilaterally and continues to demonstrate several beats of clonus on the right lower extremity but not the left.  The patient demonstrates bilateral patellar tendon reflexes at 3+.  And 2+ reflexes at the Achilles tendons bilaterally.  The patient is able to walk but currently uses a wheelchair for transportation.  The patient does not have a Luiz sign in either hand.  The patient's blood pressure was low today measured at 92/52.  The patient's incision is clean, dry, and intact.\par \par The patient is accompanied with a CT scan performed in the hospital on October 24, 2022 because of his severe pain.  No hardware complication or acute findings were noted.\par \par Taken together, I am gratified to the patient doing well following his surgical intervention.  I have recommended that the patient continue to wean himself off his pain medications as tolerated.  The patient's wife has agreed to help in this regard.  Given that the patient has only started gabapentin a couple of days ago, I have recommended coming off this medication completely.  I have recommended using methocarbamol and oxycodone only on a as needed basis.  I have encouraged the patient to discuss his blood pressure findings with his primary care physician and to continue to stay hydrated.  The patient has also been provided referrals for physical therapy and massage therapy to be started in short order.  The patient has follow-up with us in approximately 4 weeks to reevaluate his progress and I look forward to seeing the patient back at that time. [FreeTextEntry3] : Manny Keller MD, PhD, CS, FAANS Attending Neurosurgeon  of Neurosurgery [facility] at [facility] at [address], [place] [address] Office: [phone] Fax: [phone]

## 2022-12-01 ENCOUNTER — APPOINTMENT (OUTPATIENT)
Dept: NEUROSURGERY | Facility: CLINIC | Age: 72
End: 2022-12-01

## 2022-12-01 VITALS
TEMPERATURE: 97.1 F | HEART RATE: 74 BPM | DIASTOLIC BLOOD PRESSURE: 75 MMHG | SYSTOLIC BLOOD PRESSURE: 159 MMHG | OXYGEN SATURATION: 99 %

## 2022-12-01 PROCEDURE — 99024 POSTOP FOLLOW-UP VISIT: CPT

## 2022-12-01 NOTE — CONSULT LETTER
[Dear  ___] : Dear  [unfilled], [Courtesy Letter:] : I had the pleasure of seeing your patient, [unfilled], in my office today. [Sincerely,] : Sincerely, [FreeTextEntry2] : Beka Abarca MD [address] [place], [place] [id number] [FreeTextEntry1] : Mr. Olvera is a very pleasant 72-year-old male patient who was seen in our office today in follow-up.  The patient underwent a C3-6 laminoplasty approximately 6 weeks ago.\par \par I am happy to report that the patient is currently doing well following his surgical intervention.  The patient has made significant gains since his last visit and is currently quite happy with his progress.  The patient does still have right-sided neck pain for which she takes Aleve.  The patient is on no other medications for pain.  The patient states that his physical therapist has been working with him with regards to his neck pain and discomfort but also with his gait and balance which he feels is improving.\par \par On examination, the patient is alert, oriented, and compliant with the exam.  The patient continues to demonstrate full strength in the lower extremities with ankle clonus on the right but not the left.  The patient continues to demonstrate 3+ reflexes at the patella tendons bilaterally, and to 2+ reflexes at the Achilles tendons bilaterally.  The patient is walking independently without the use of any assistive aids.  The patient's incision is clean, dry, and intact.\par \par I have no new imaging to review today.\par \par Taken together, I am gratified to see the patient doing well following his surgical intervention.  At this time, I have no concerns for the patient but have recommended that he continue with physical therapy with specific emphasis on postural training and gait and balance training.  I have lifted all activity restrictions at this time and recommended a gradual return to activity.  I have recommended follow-up with us in approximately 6 weeks to reevaluate his progress and I look forward to seeing the patient back then.  The patient is aware that he may contact her office anytime sooner should the need arise. [FreeTextEntry3] : Manny Keller MD, PhD, CS, FAANS Attending Neurosurgeon  of Neurosurgery [facility] at [facility] at [address], [place] [address] Office: [phone] Fax: [phone]

## 2023-01-06 LAB
ANION GAP SERPL CALC-SCNC: 14 MMOL/L
BUN SERPL-MCNC: 13 MG/DL
CALCIUM SERPL-MCNC: 9.8 MG/DL
CHLORIDE SERPL-SCNC: 103 MMOL/L
CHOLEST SERPL-MCNC: 122 MG/DL
CO2 SERPL-SCNC: 28 MMOL/L
CREAT SERPL-MCNC: 0.82 MG/DL
CREAT SPEC-SCNC: 76 MG/DL
EGFR: 93 ML/MIN/1.73M2
ESTIMATED AVERAGE GLUCOSE: 108 MG/DL
GLUCOSE SERPL-MCNC: 104 MG/DL
HBA1C MFR BLD HPLC: 5.4 %
HDLC SERPL-MCNC: 50 MG/DL
LDLC SERPL CALC-MCNC: 55 MG/DL
MICROALBUMIN 24H UR DL<=1MG/L-MCNC: 6.1 MG/DL
MICROALBUMIN/CREAT 24H UR-RTO: 80 MG/G
NONHDLC SERPL-MCNC: 72 MG/DL
POTASSIUM SERPL-SCNC: 4.5 MMOL/L
SODIUM SERPL-SCNC: 145 MMOL/L
TRIGL SERPL-MCNC: 85 MG/DL

## 2023-01-11 ENCOUNTER — APPOINTMENT (OUTPATIENT)
Dept: NEUROSURGERY | Facility: CLINIC | Age: 73
End: 2023-01-11
Payer: MEDICARE

## 2023-01-11 VITALS
BODY MASS INDEX: 24.99 KG/M2 | HEART RATE: 69 BPM | TEMPERATURE: 97.4 F | SYSTOLIC BLOOD PRESSURE: 166 MMHG | HEIGHT: 65 IN | DIASTOLIC BLOOD PRESSURE: 86 MMHG | WEIGHT: 150 LBS | OXYGEN SATURATION: 97 %

## 2023-01-11 PROCEDURE — 99024 POSTOP FOLLOW-UP VISIT: CPT

## 2023-01-11 NOTE — CONSULT LETTER
[Dear  ___] : Dear  [unfilled], [Courtesy Letter:] : I had the pleasure of seeing your patient, [unfilled], in my office today. [Sincerely,] : Sincerely, [FreeTextEntry2] : Beka Abarca MD [address] [place], [place] [address]  [FreeTextEntry1] : Mr. Olvera is a very pleasant 72-year-old male patient who was seen in our office approximately 3 months following his cervical laminoplasty.\par \par I am happy to report that the patient is continuing to do well following his surgical intervention though he continues to complain of mild numbness in the left hand and difficulty walking.  These issues are significantly improved compared to his preoperative state but are persistent.  The patient also complains of slight hand dexterity issues which are also improving.  At this time, the patient complains primarily of neck stiffness and pain for which she is taking oxycodone as needed, Aleve as needed, and gabapentin regularly.\par \par On examination, the patient is alert, oriented, and compliant with the exam.  The patient's incision is clean, dry, and intact.  The patient has good range of motion of the cervical spine despite his fusion.  The patient has a tendency to keep his cervical spine and cervical kyphosis but is able to straighten up his neck and posture when requested.  The patient continues to demonstrate full strength in the lower extremities bilaterally with ankle clonus on the right but not the left.  The patient continues to demonstrate 3+ reflexes in the patella tendons bilaterally and 2+ reflexes at the Achilles tendons bilaterally.  The patient is walking independently.  With regards to his lumbar spine, the patient does have a fairly focal area of tenderness to the left and to the right of the lumbar spine possibly near the sacroiliac joints.\par \par I have no new imaging to review today.\par \par Taken together, I am gratified to see the patient doing well.  Unfortunately, while the patient has been participating with physical therapy in regards to his neck, the patient states that he has not had significant gait and/or balance training.  At this time, I have recommended adding hand therapy, gait and balance training, and lumbar spine exercises to help manage his current symptoms.  I have recommended follow-up with his pain management physician with regards to the possibility of SI joint injections.  I have counseled the patient that he may consider talking to his neurologist about weaning his gabapentin given that he does not believe he is making any difference with regards to his pain.  I have recommended follow-up with us in approximately 3 months to reevaluate his progress and I look forward to seeing him back at that time.\par \par  [FreeTextEntry3] : Manny Keller MD, PhD, CS, FAANS Attending Neurosurgeon  of Neurosurgery [facility] at [facility] at [address], [place] [address] Office: [phone] Fax: [phone]

## 2023-02-16 ENCOUNTER — APPOINTMENT (OUTPATIENT)
Dept: ENDOCRINOLOGY | Facility: CLINIC | Age: 73
End: 2023-02-16
Payer: MEDICARE

## 2023-02-16 VITALS
HEIGHT: 65 IN | RESPIRATION RATE: 16 BRPM | HEART RATE: 59 BPM | DIASTOLIC BLOOD PRESSURE: 75 MMHG | BODY MASS INDEX: 25.83 KG/M2 | SYSTOLIC BLOOD PRESSURE: 152 MMHG | OXYGEN SATURATION: 98 % | WEIGHT: 155 LBS | TEMPERATURE: 97.3 F

## 2023-02-16 PROCEDURE — 99214 OFFICE O/P EST MOD 30 MIN: CPT | Mod: 25

## 2023-02-16 PROCEDURE — 95251 CONT GLUC MNTR ANALYSIS I&R: CPT

## 2023-03-03 ENCOUNTER — NON-APPOINTMENT (OUTPATIENT)
Age: 73
End: 2023-03-03

## 2023-03-03 ENCOUNTER — APPOINTMENT (OUTPATIENT)
Dept: OPHTHALMOLOGY | Facility: CLINIC | Age: 73
End: 2023-03-03
Payer: MEDICARE

## 2023-03-03 PROCEDURE — 92012 INTRM OPH EXAM EST PATIENT: CPT

## 2023-03-06 ENCOUNTER — NON-APPOINTMENT (OUTPATIENT)
Age: 73
End: 2023-03-06

## 2023-03-06 ENCOUNTER — APPOINTMENT (OUTPATIENT)
Dept: OPHTHALMOLOGY | Facility: CLINIC | Age: 73
End: 2023-03-06
Payer: MEDICARE

## 2023-03-06 PROCEDURE — 92012 INTRM OPH EXAM EST PATIENT: CPT

## 2023-03-09 ENCOUNTER — TRANSCRIPTION ENCOUNTER (OUTPATIENT)
Age: 73
End: 2023-03-09

## 2023-03-09 RX ORDER — LIRAGLUTIDE 6 MG/ML
18 INJECTION SUBCUTANEOUS DAILY
Qty: 3 | Refills: 1 | Status: DISCONTINUED | COMMUNITY
Start: 1900-01-01 | End: 2023-03-09

## 2023-03-13 ENCOUNTER — APPOINTMENT (OUTPATIENT)
Dept: OPHTHALMOLOGY | Facility: CLINIC | Age: 73
End: 2023-03-13
Payer: MEDICARE

## 2023-03-13 ENCOUNTER — NON-APPOINTMENT (OUTPATIENT)
Age: 73
End: 2023-03-13

## 2023-03-13 PROCEDURE — 92012 INTRM OPH EXAM EST PATIENT: CPT

## 2023-03-20 NOTE — H&P ADULT - GASTROINTESTINAL
[address] [place] [address] [phone]). details…

## 2023-04-11 ENCOUNTER — APPOINTMENT (OUTPATIENT)
Dept: NEUROSURGERY | Facility: CLINIC | Age: 73
End: 2023-04-11
Payer: MEDICARE

## 2023-04-11 DIAGNOSIS — M53.3 SACROCOCCYGEAL DISORDERS, NOT ELSEWHERE CLASSIFIED: ICD-10-CM

## 2023-04-11 DIAGNOSIS — M54.42 LUMBAGO WITH SCIATICA, LEFT SIDE: ICD-10-CM

## 2023-04-11 DIAGNOSIS — G89.29 LUMBAGO WITH SCIATICA, LEFT SIDE: ICD-10-CM

## 2023-04-11 DIAGNOSIS — G89.29 SACROCOCCYGEAL DISORDERS, NOT ELSEWHERE CLASSIFIED: ICD-10-CM

## 2023-04-11 DIAGNOSIS — M54.41 LUMBAGO WITH SCIATICA, LEFT SIDE: ICD-10-CM

## 2023-04-11 PROCEDURE — 99215 OFFICE O/P EST HI 40 MIN: CPT

## 2023-04-11 NOTE — CONSULT LETTER
[Dear  ___] : Dear  [unfilled], [Courtesy Letter:] : I had the pleasure of seeing your patient, [unfilled], in my office today. [Sincerely,] : Sincerely, [FreeTextEntry2] : Beka Abarca MD [address] [place], [place] [id number]   [FreeTextEntry1] : Mr. Olvera is a very pleasant 73-year-old male patient who was seen in our office today in follow-up.  This visit represents an approximate 6-month follow-up.\par \par Although the patient's upper extremity symptoms have improved significantly following his surgical intervention, the patient continues to complain of lower extremity symptoms primarily with difficulty with balance and gait.  The patient denies any ongoing clumsiness or numbness in the upper extremities.  The patient complains of neck pain on occasion with movements.  The patient was previously taking Aleve, oxycodone, and gabapentin but has been weaning himself off the gabapentin and oxycodone.  The patient has had chronic low back pain and bilateral leg symptoms which were not expected to improve with surgical intervention of the cervical spine.  The patient continues to have these issues.  The patient complains primarily of fairly focal pain in the low back slightly left and right of midline.  The patient has had injections around this region without significant relief.  The patient also complains of heaviness in the legs bilaterally with activity.  When asked, the patient states that, while he has been at physical therapy, they have not been performing gait and balance training exercises with him.\par \par On examination, the patient is alert, oriented, and compliant with the exam.  The patient does not have a Luiz sign in either hand nor clonus in either ankle.  This is an improvement compared to previous.  The patient continues to have difficulty with tandem walk.  The patient continues to demonstrate 2+ reflexes in the lower extremities at the Achilles and patellar tendons bilaterally.\par \par I have no new imaging to review today.  Apparently the patient obtained an MRI scan of the cervical spine approximately 1 week ago which I do not have access to.  The patient will be bringing copies of these MRI scans to our office in short order.\par \par Taken together, I am gratified see the patient's upper extremity symptoms improving.  However, the patient continues to have lower extremity symptoms and I have recommended that the patient start gait and balance training exercises.  Unfortunately, the patient has not been participating in these exercises over the last several months.  I explained to the patient that his most recent MRI scan of the lumbar spine performed on February 3, 2022 demonstrated congenitally narrow spinal canal but without overt nerve root compression.  The patient sensation of heaviness in the lower extremities may be result of neurogenic claudication, vascular claudication, or simple muscle disuse.  I have recommended that the patient continue physical therapy for leg strengthening exercises while obtaining updated MRI scans of the lumbar spine and arterial Dopplers.  I have recommended shorter follow-up in approximately 6 weeks to review his imaging findings. [FreeTextEntry3] : Manny Keller MD, PhD, CS, FAANS Attending Neurosurgeon  of Neurosurgery [facility] at [facility] at [address], [place] [address] Office: [phone] Fax: [phone]

## 2023-05-23 ENCOUNTER — APPOINTMENT (OUTPATIENT)
Dept: NEUROSURGERY | Facility: CLINIC | Age: 73
End: 2023-05-23
Payer: MEDICARE

## 2023-05-23 VITALS — SYSTOLIC BLOOD PRESSURE: 145 MMHG | DIASTOLIC BLOOD PRESSURE: 80 MMHG | HEART RATE: 65 BPM | OXYGEN SATURATION: 98 %

## 2023-05-23 DIAGNOSIS — M25.512 PAIN IN LEFT SHOULDER: ICD-10-CM

## 2023-05-23 DIAGNOSIS — G89.29 PAIN IN LEFT SHOULDER: ICD-10-CM

## 2023-05-23 PROCEDURE — 99215 OFFICE O/P EST HI 40 MIN: CPT

## 2023-05-23 RX ORDER — OXYCODONE 5 MG/1
5 TABLET ORAL EVERY 6 HOURS
Qty: 60 | Refills: 0 | Status: ACTIVE | COMMUNITY
Start: 2023-01-26 | End: 1900-01-01

## 2023-05-23 NOTE — CONSULT LETTER
[Dear  ___] : Dear  [unfilled], [Courtesy Letter:] : I had the pleasure of seeing your patient, [unfilled], in my office today. [Sincerely,] : Sincerely, [FreeTextEntry2] : Beka Abarca MD [address] [place], [place] [id number]   [FreeTextEntry1] : Mr. Olvera is a very pleasant 73-year-old male patient who was seen in our office today in follow up. The patient underwent a cervical laminoplasty in October 2022.\par \par Although the patient's upper extremity symptoms have improved significantly, the patient continues to complain of lower extremity heaviness.  The patient believes these symptoms are stable but does have good days and bad days.  Although the patient describes his lower extremity symptoms as a "heaviness", the patient states that oxycodone does help with these symptoms.  The patient's symptoms worsen with walking and activity.  The patient's symptoms are better with rest.  The patient denies any significant upper extremity symptoms.  The patient has mild left-sided shoulder pain for which he is seeing pain management for. \par \par On examination, the patient is alert, oriented, and compliant with the exam.  The patient demonstrates full strength in the upper and lower extremities bilaterally.  The patient remains hyperreflexic in the lower extremities bilaterally with ankle clonus on the right similar to his preoperative evaluation.  The patient does not have a Luiz sign in either hand.  Manipulation of the patient's hip bilaterally causes pain.\par \par The patient is accompanied with an MRI scan of the lumbar spine dated April 5, 2023.  These images demonstrate lumbar stenosis most notably at L4/5 centrally and in the lateral recesses.  These images do not appear significantly changed compared to the patient's previous MRI scans of the lumbar spine from 2021 though they do appear slightly worsened compared to previous images from 2020.\par \par Taken together, I am unclear as to the nature of the patient's persistent lower extremity symptoms.  The patient's description of heaviness in the lower extremities is a little unclear and may be associated with the pain as it seems to be responsive to oxycodone.  I explained to the patient that his vascular imaging recently performed did not show any significant vascular stenosis.  The patient's updated MRI scans did not show any significant changes with regards to lumbar stenosis compared to his last images but do demonstrate the possibility of lateral recess stenosis.  I informed the patient that he may be a candidate for surgical decompression at this level depending on his symptoms and other images.  At this time, I have recommended updated images of the cervical and thoracic spine to rule out any other possible structural lesions causing his symptoms which may ultimately be a result of his preoperative myelopathy.  Finally, the patient's pain in the hips with movements may suggest an arthritic component as well.  At this time, I have recommended follow-up with us once his updated images are complete so that we can reevaluate his options.  In the interim, the patient will be obtaining injections with his pain management doctor for both therapeutic and diagnostic purposes. [FreeTextEntry3] : Manny Keller MD, PhD, CS, FAANS Attending Neurosurgeon  of Neurosurgery [facility] at [facility] at [address], [place] [address] Office: [phone] Fax: [phone]

## 2023-06-27 ENCOUNTER — APPOINTMENT (OUTPATIENT)
Dept: NEUROSURGERY | Facility: CLINIC | Age: 73
End: 2023-06-27
Payer: MEDICARE

## 2023-06-27 VITALS — HEART RATE: 62 BPM | OXYGEN SATURATION: 99 % | SYSTOLIC BLOOD PRESSURE: 137 MMHG | DIASTOLIC BLOOD PRESSURE: 81 MMHG

## 2023-06-27 PROCEDURE — 99215 OFFICE O/P EST HI 40 MIN: CPT

## 2023-06-27 NOTE — CONSULT LETTER
[Dear  ___] : Dear  [unfilled], [Courtesy Letter:] : I had the pleasure of seeing your patient, [unfilled], in my office today. [Sincerely,] : Sincerely, [FreeTextEntry2] : Beka Abarca MD [address] [place], [place] [id number]     [FreeTextEntry1] : Mr. Olvera is a very pleasant 73-year-old male patient who was seen in our office today in follow-up.  The patient underwent a cervical laminoplasty in October 19, 2023.  \par \par The patient presented with upper extremity symptoms more so than lower extremity symptoms preoperatively.  The patient did well following surgical intervention with regards to his upper extremity symptoms but started noticing increasing difficulty walking.  Currently, the patient continues to remain unsteady on his feet and states that he does not have the confidence to walk.  Unfortunately, the patient has been limiting his walking as a result.  The patient's preoperative assessment was reviewed and it was noted that the patient had right-sided clonus in the lower extremities previously.  The patient was also unable to tandem walk at the time of his assessment prior to surgery. \par \par On examination, the patient is alert, oriented, and compliant with the exam.  The patient ambulates actually quite well with good nikki but demonstrates a slightly spastic gait.  The patient continues to demonstrate 2+ reflexes in the lower extremities bilaterally with ankle clonus on the right.  The patient does not demonstrate a Luiz sign in either hand.  The patient demonstrates mild weakness with hip flexion and ankle dorsiflexion on the left rated at 4/5.\par \par The patient is accompanied with an MRI scan of the cervical spine performed on June 7, 2023.  These images demonstrate adequate decompression of the cervical cord at the operated levels. Multiple levels demonstrate foraminal stenosis secondary to degenerative changes. The patient is a additionally accompanied with an MRI scan of the thoracic spine performed the same date which demonstrates degenerative changes but without overt nerve root or cord compression.\par \par Taken together, the patient has a clinical history and radiographic findings most consistent with a myelopathy as the primary cause of his difficulty walking.  Although the patient has now been decompressed, some of his myelopathic symptoms remain.  The patient has not attempted any physical therapy with regards to gait and balance but have rather been focusing his physical therapy treatment on pain in the neck and low back.  Given that the symptoms have now receded, I have recommended physical therapy with specific emphasis on gait and balance.  The patient demonstrates good strength in the lower extremities bilaterally and the patient demonstrates good nikki and stride length currently.  As such, I suspect that the patient will do quite well with physical therapy.  I reassured the patient that he currently does not have any surgical lesions at this time.  The patient is also being investigated by his neurologist for other possible causes of difficulty walking which I think is very reasonable.  The patient will have follow-up with us in a few months to reevaluate his progress and look forward to seeing the patient back then.  The patient also has a scheduled vacation to [place] and I have reassured the patient that he has no travel restrictions at this time. [FreeTextEntry3] : Manny Keller MD, PhD, CS, FAANS Attending Neurosurgeon  of Neurosurgery [facility] at [facility] at [address], [place] [address] Office: [phone] Fax: [phone]

## 2023-07-08 ENCOUNTER — NON-APPOINTMENT (OUTPATIENT)
Age: 73
End: 2023-07-08

## 2023-07-18 ENCOUNTER — APPOINTMENT (OUTPATIENT)
Dept: ENDOCRINOLOGY | Facility: CLINIC | Age: 73
End: 2023-07-18

## 2023-07-20 ENCOUNTER — NON-APPOINTMENT (OUTPATIENT)
Age: 73
End: 2023-07-20

## 2023-07-20 ENCOUNTER — APPOINTMENT (OUTPATIENT)
Dept: OPHTHALMOLOGY | Facility: CLINIC | Age: 73
End: 2023-07-20
Payer: MEDICARE

## 2023-07-20 PROCEDURE — 92134 CPTRZ OPH DX IMG PST SGM RTA: CPT

## 2023-07-20 PROCEDURE — 92014 COMPRE OPH EXAM EST PT 1/>: CPT

## 2023-07-25 ENCOUNTER — RX RENEWAL (OUTPATIENT)
Age: 73
End: 2023-07-25

## 2023-08-01 ENCOUNTER — TRANSCRIPTION ENCOUNTER (OUTPATIENT)
Age: 73
End: 2023-08-01

## 2023-08-14 ENCOUNTER — APPOINTMENT (OUTPATIENT)
Dept: OPHTHALMOLOGY | Facility: CLINIC | Age: 73
End: 2023-08-14

## 2023-11-08 LAB
HBA1C MFR BLD HPLC: 5.9
LDLC SERPL DIRECT ASSAY-MCNC: 37

## 2023-11-09 ENCOUNTER — APPOINTMENT (OUTPATIENT)
Dept: ENDOCRINOLOGY | Facility: CLINIC | Age: 73
End: 2023-11-09
Payer: MEDICARE

## 2023-11-09 VITALS
BODY MASS INDEX: 24.99 KG/M2 | DIASTOLIC BLOOD PRESSURE: 72 MMHG | OXYGEN SATURATION: 96 % | HEART RATE: 86 BPM | SYSTOLIC BLOOD PRESSURE: 132 MMHG | WEIGHT: 150 LBS | HEIGHT: 65 IN

## 2023-11-09 DIAGNOSIS — E78.5 HYPERLIPIDEMIA, UNSPECIFIED: ICD-10-CM

## 2023-11-09 DIAGNOSIS — I10 ESSENTIAL (PRIMARY) HYPERTENSION: ICD-10-CM

## 2023-11-09 LAB — GLUCOSE BLDC GLUCOMTR-MCNC: 149

## 2023-11-09 PROCEDURE — 95251 CONT GLUC MNTR ANALYSIS I&R: CPT

## 2023-11-09 PROCEDURE — 99214 OFFICE O/P EST MOD 30 MIN: CPT | Mod: 25

## 2023-11-09 PROCEDURE — 82962 GLUCOSE BLOOD TEST: CPT

## 2023-11-09 RX ORDER — FINASTERIDE 5 MG/1
5 TABLET, FILM COATED ORAL
Refills: 0 | Status: ACTIVE | COMMUNITY

## 2023-11-09 RX ORDER — ROSUVASTATIN CALCIUM 20 MG/1
20 TABLET, FILM COATED ORAL
Refills: 0 | Status: ACTIVE | COMMUNITY

## 2023-11-09 RX ORDER — PROPRANOLOL HCL 120 MG
120 CAPSULE, EXTENDED RELEASE 24HR ORAL
Refills: 0 | Status: ACTIVE | COMMUNITY

## 2023-11-09 RX ORDER — MIRABEGRON 25 MG/1
25 TABLET, FILM COATED, EXTENDED RELEASE ORAL
Refills: 0 | Status: ACTIVE | COMMUNITY

## 2023-11-09 RX ORDER — LOSARTAN POTASSIUM AND HYDROCHLOROTHIAZIDE 25; 100 MG/1; MG/1
100-25 TABLET ORAL
Refills: 0 | Status: ACTIVE | COMMUNITY

## 2023-11-09 RX ORDER — PREGABALIN 50 MG/1
50 CAPSULE ORAL
Refills: 0 | Status: ACTIVE | COMMUNITY

## 2023-12-20 ENCOUNTER — APPOINTMENT (OUTPATIENT)
Dept: OPHTHALMOLOGY | Facility: CLINIC | Age: 73
End: 2023-12-20
Payer: MEDICARE

## 2023-12-20 ENCOUNTER — NON-APPOINTMENT (OUTPATIENT)
Age: 73
End: 2023-12-20

## 2023-12-20 PROCEDURE — 92133 CPTRZD OPH DX IMG PST SGM ON: CPT

## 2023-12-20 PROCEDURE — 99214 OFFICE O/P EST MOD 30 MIN: CPT

## 2024-01-10 ENCOUNTER — APPOINTMENT (OUTPATIENT)
Dept: OPHTHALMOLOGY | Facility: CLINIC | Age: 74
End: 2024-01-10
Payer: MEDICARE

## 2024-01-10 ENCOUNTER — NON-APPOINTMENT (OUTPATIENT)
Age: 74
End: 2024-01-10

## 2024-01-10 PROCEDURE — 92136 OPHTHALMIC BIOMETRY: CPT

## 2024-01-10 PROCEDURE — 92014 COMPRE OPH EXAM EST PT 1/>: CPT

## 2024-02-06 ENCOUNTER — NON-APPOINTMENT (OUTPATIENT)
Age: 74
End: 2024-02-06

## 2024-02-06 ENCOUNTER — APPOINTMENT (OUTPATIENT)
Dept: OPHTHALMOLOGY | Facility: AMBULATORY SURGERY CENTER | Age: 74
End: 2024-02-06
Payer: MEDICARE

## 2024-02-06 PROCEDURE — CL050: CPT

## 2024-02-06 PROCEDURE — 66982 XCAPSL CTRC RMVL CPLX WO ECP: CPT | Mod: RT

## 2024-02-07 ENCOUNTER — NON-APPOINTMENT (OUTPATIENT)
Age: 74
End: 2024-02-07

## 2024-02-07 ENCOUNTER — APPOINTMENT (OUTPATIENT)
Dept: OPHTHALMOLOGY | Facility: CLINIC | Age: 74
End: 2024-02-07
Payer: MEDICARE

## 2024-02-07 PROCEDURE — 99024 POSTOP FOLLOW-UP VISIT: CPT

## 2024-02-14 ENCOUNTER — APPOINTMENT (OUTPATIENT)
Dept: OPHTHALMOLOGY | Facility: CLINIC | Age: 74
End: 2024-02-14
Payer: MEDICARE

## 2024-02-14 ENCOUNTER — NON-APPOINTMENT (OUTPATIENT)
Age: 74
End: 2024-02-14

## 2024-02-14 PROCEDURE — 92136 OPHTHALMIC BIOMETRY: CPT | Mod: 26,LT

## 2024-02-14 PROCEDURE — 99213 OFFICE O/P EST LOW 20 MIN: CPT | Mod: 24

## 2024-02-20 ENCOUNTER — APPOINTMENT (OUTPATIENT)
Dept: OPHTHALMOLOGY | Facility: AMBULATORY SURGERY CENTER | Age: 74
End: 2024-02-20
Payer: MEDICARE

## 2024-02-20 ENCOUNTER — NON-APPOINTMENT (OUTPATIENT)
Age: 74
End: 2024-02-20

## 2024-02-20 PROCEDURE — 66982 XCAPSL CTRC RMVL CPLX WO ECP: CPT | Mod: 79,LT

## 2024-02-20 PROCEDURE — CL050: CPT

## 2024-02-21 ENCOUNTER — APPOINTMENT (OUTPATIENT)
Dept: OPHTHALMOLOGY | Facility: CLINIC | Age: 74
End: 2024-02-21

## 2024-02-21 ENCOUNTER — APPOINTMENT (OUTPATIENT)
Dept: OPHTHALMOLOGY | Facility: CLINIC | Age: 74
End: 2024-02-21
Payer: MEDICARE

## 2024-02-21 ENCOUNTER — NON-APPOINTMENT (OUTPATIENT)
Age: 74
End: 2024-02-21

## 2024-02-21 PROCEDURE — 99024 POSTOP FOLLOW-UP VISIT: CPT

## 2024-02-28 ENCOUNTER — APPOINTMENT (OUTPATIENT)
Dept: OPHTHALMOLOGY | Facility: CLINIC | Age: 74
End: 2024-02-28
Payer: MEDICARE

## 2024-02-28 ENCOUNTER — APPOINTMENT (OUTPATIENT)
Dept: OPHTHALMOLOGY | Facility: CLINIC | Age: 74
End: 2024-02-28

## 2024-02-28 ENCOUNTER — NON-APPOINTMENT (OUTPATIENT)
Age: 74
End: 2024-02-28

## 2024-02-28 PROCEDURE — 99024 POSTOP FOLLOW-UP VISIT: CPT

## 2024-03-14 ENCOUNTER — NON-APPOINTMENT (OUTPATIENT)
Age: 74
End: 2024-03-14

## 2024-03-14 ENCOUNTER — APPOINTMENT (OUTPATIENT)
Dept: OPHTHALMOLOGY | Facility: CLINIC | Age: 74
End: 2024-03-14
Payer: MEDICARE

## 2024-03-14 PROCEDURE — 99024 POSTOP FOLLOW-UP VISIT: CPT

## 2024-03-14 PROCEDURE — ZZZZZ: CPT

## 2024-03-20 ENCOUNTER — APPOINTMENT (OUTPATIENT)
Dept: OPHTHALMOLOGY | Facility: CLINIC | Age: 74
End: 2024-03-20

## 2024-03-21 ENCOUNTER — APPOINTMENT (OUTPATIENT)
Dept: OPHTHALMOLOGY | Facility: CLINIC | Age: 74
End: 2024-03-21
Payer: MEDICARE

## 2024-03-21 ENCOUNTER — NON-APPOINTMENT (OUTPATIENT)
Age: 74
End: 2024-03-21

## 2024-03-21 PROCEDURE — 99024 POSTOP FOLLOW-UP VISIT: CPT

## 2024-03-21 PROCEDURE — ZZZZZ: CPT

## 2024-03-28 ENCOUNTER — NON-APPOINTMENT (OUTPATIENT)
Age: 74
End: 2024-03-28

## 2024-03-28 ENCOUNTER — APPOINTMENT (OUTPATIENT)
Dept: OPHTHALMOLOGY | Facility: CLINIC | Age: 74
End: 2024-03-28
Payer: MEDICARE

## 2024-03-28 PROCEDURE — 99024 POSTOP FOLLOW-UP VISIT: CPT

## 2024-03-28 PROCEDURE — ZZZZZ: CPT

## 2024-04-04 ENCOUNTER — NON-APPOINTMENT (OUTPATIENT)
Age: 74
End: 2024-04-04

## 2024-04-04 ENCOUNTER — APPOINTMENT (OUTPATIENT)
Dept: OPHTHALMOLOGY | Facility: CLINIC | Age: 74
End: 2024-04-04
Payer: MEDICARE

## 2024-04-04 PROCEDURE — ZZZZZ: CPT

## 2024-04-04 PROCEDURE — 99024 POSTOP FOLLOW-UP VISIT: CPT

## 2024-04-09 ENCOUNTER — APPOINTMENT (OUTPATIENT)
Dept: NEUROLOGY | Facility: CLINIC | Age: 74
End: 2024-04-09

## 2024-05-16 ENCOUNTER — APPOINTMENT (OUTPATIENT)
Dept: NEUROSURGERY | Facility: CLINIC | Age: 74
End: 2024-05-16
Payer: MEDICARE

## 2024-05-16 VITALS — SYSTOLIC BLOOD PRESSURE: 137 MMHG | DIASTOLIC BLOOD PRESSURE: 74 MMHG | OXYGEN SATURATION: 100 % | HEART RATE: 74 BPM

## 2024-05-16 DIAGNOSIS — I73.9 PERIPHERAL VASCULAR DISEASE, UNSPECIFIED: ICD-10-CM

## 2024-05-16 DIAGNOSIS — Z98.890 OTHER SPECIFIED POSTPROCEDURAL STATES: ICD-10-CM

## 2024-05-16 PROCEDURE — 99215 OFFICE O/P EST HI 40 MIN: CPT

## 2024-05-20 NOTE — CONSULT LETTER
[Dear  ___] : Dear  [unfilled], [Courtesy Letter:] : I had the pleasure of seeing your patient, [unfilled], in my office today. [Sincerely,] : Sincerely, [FreeTextEntry2] : Beka Abarca MD  [address] [place], [place] [id number] [FreeTextEntry1] : Mr. Olvera is a very pleasant 74-year-old male patient who was seen in our office today in follow-up.  The patient underwent a cervical laminoplasty for progressive myelopathy on October 19, 2022.  Unfortunately, the patient returned to our office today with complaints of progressive difficulties ambulating.  The patient has noted increasing difficulty walking and specifically states that his legs feel heavy and difficult to maneuver.  The patient is unclear whether or not he feels significant pain in the lower extremities.  The patient states that his symptoms tend to worsen with prolonged walking, however.  The patient has also noted urinary incontinence but states that this is primarily because he cannot reach the bathroom in time as a result of his lower extremity symptoms.  The patient endorses weakness in the lower extremities.  The patient denies any significant difficulties with his upper extremity function.  On examination, the patient is alert, oriented, and compliant with the exam.  The patient has good nikki with his gait, but continues to have a wide-based.  The patient also does not appear to be lifting his legs well when walking.  The patient demonstrates hyperreflexia and ankle clonus on the right lower extremity which was present prior to surgery and unchanged.  The patient does not have a Luiz sign or hand.  The patient is accompanied with an updated MRI scan of the lumbar spine dated March 9, 2024.  These images demonstrated moderate degenerative changes in the lumbar spine with facet arthrosis primarily at L4/5 with bilateral foraminal stenosis and lateral recess stenosis at L3/4.  The patient's most recent MRI scans of the cervical spine were performed on June 7, 2023 which did not demonstrate any residual stenosis of the cervical spine.  The patient additionally obtained a thoracic MRI scan at that time which did not demonstrate any structural lesions compressing the spinal cord.  Taken together, I do not have a clear explanation for the patient's worsening symptoms.  The patient is currently under the care of a neurologist who has been evaluating him appropriately and has recently suggested the possibility of normal pressure hydrocephalus.  The patient himself does not endorse significant cognitive dysfunction but the patient's wife does mention increasing forgetfulness.  I have recommended an updated MRI scan of the cervical spine to rule new structural pathology though my suspicion is low.  I have also recommended ultrasounds of the lower extremities to rule out the possibility of vascular stenosis though, again, my suspicion is low.  The patient has been provided a physical therapy prescription to continue to help with his gait and balance issues.  The patient has been informed that his lumbar stenosis is a possible candidate for surgery should he have neurogenic claudication symptoms.  However, the patient currently is not certain whether he actually has pain or discomfort with walking.  The patient has been advised to pay more attention to his symptoms and return to our office shortly to review his imaging findings so that we can develop a more specific treatment plan going forward. [FreeTextEntry3] : Manny Keller MD, PhD, CS, FAANS Attending Neurosurgeon  of Neurosurgery [facility] at [facility] at [address], [place] [address] Office: [phone] Fax: [phone]

## 2024-05-23 ENCOUNTER — APPOINTMENT (OUTPATIENT)
Dept: NEUROSURGERY | Facility: CLINIC | Age: 74
End: 2024-05-23
Payer: MEDICARE

## 2024-05-23 VITALS — SYSTOLIC BLOOD PRESSURE: 132 MMHG | DIASTOLIC BLOOD PRESSURE: 78 MMHG | HEART RATE: 68 BPM | OXYGEN SATURATION: 98 %

## 2024-05-23 DIAGNOSIS — G95.9 DISEASE OF SPINAL CORD, UNSPECIFIED: ICD-10-CM

## 2024-05-23 DIAGNOSIS — G99.2 SPINAL STENOSIS, CERVICAL REGION: ICD-10-CM

## 2024-05-23 DIAGNOSIS — R27.0 ATAXIA, UNSPECIFIED: ICD-10-CM

## 2024-05-23 DIAGNOSIS — M48.02 SPINAL STENOSIS, CERVICAL REGION: ICD-10-CM

## 2024-05-23 PROCEDURE — 99214 OFFICE O/P EST MOD 30 MIN: CPT

## 2024-05-23 NOTE — CONSULT LETTER
[Dear  ___] : Dear  [unfilled], [Courtesy Letter:] : I had the pleasure of seeing your patient, [unfilled], in my office today. [Sincerely,] : Sincerely, [FreeTextEntry2] : Beka Abarca MD  [address] [place], [place] [id number] [FreeTextEntry1] : Mr. Olvera is a very pleasant 74-year-old male patient who was seen in our office today in follow-up.  The patient has been complaining of increasing difficulty walking and was organized advanced imaging which was reviewed today.  Unfortunately, the patient has not noticed any significant difference in his difficulty walking.  The patient endorses both a combination of discomfort and coordination difficulties leading to his issues.  The patient is under the care of a neurologist as well as a pain management physician.  He has been offered both a lumbar puncture for the possibility of normal pressure hydrocephalus and a MILD procedure for lumbar stenosis respectively.    On examination, the patient remains alert, oriented, and compliant with the exam.  The patient continues to demonstrate a slightly magnetic and wide-based gait.  The patient continues to demonstrate hyperreflexia and clonus on the right.  The patient does not have a Luiz sign.  The patient is accompanied with a new MRI scan of the cervical spine dated May 18, 2024.  These images demonstrated adequate decompression of the spinal cord without ongoing cord compression or evidence of new surgically significant stenosis.  The patient's most recent MRI scan of the lumbar spine was performed on March 9, 2024 which demonstrated worsening lateral recess stenosis at L3/4 and moderate foraminal stenosis at L4/5.  Taken together, the patient has a clinical history and radiographic evidence most consistent with multifactorial difficulty walking.  The patient's baseline myelopathy likely contributes to his difficulty walking in addition to his lumbar stenosis and possibly normal pressure hydrocephalus.  We spent some time discussing possible diagnostic and therapeutic options including a lumbar puncture or drain for the possibility of NPH as well as the MILD procedure for his lumbar stenosis.  The patient has also been informed that he is a candidate for a surgical lumbar decompression through a minimally invasive approach should his claudication symptoms worsen.  At this time, the patient would like to return home and consider his options.  The patient will be following up with us on an as-needed basis. [FreeTextEntry3] : Manny Keller MD, PhD, CS, FAANS Attending Neurosurgeon  of Neurosurgery [facility] at [facility] at [address], [place] [address] Office: [phone] Fax: [phone]

## 2024-05-30 RX ORDER — METFORMIN HYDROCHLORIDE 1000 MG/1
1000 TABLET, COATED ORAL
Qty: 180 | Refills: 1 | Status: ACTIVE | COMMUNITY
Start: 2022-04-29 | End: 1900-01-01

## 2024-06-05 ENCOUNTER — RX RENEWAL (OUTPATIENT)
Age: 74
End: 2024-06-05

## 2024-06-05 RX ORDER — SEMAGLUTIDE 0.68 MG/ML
2 INJECTION, SOLUTION SUBCUTANEOUS
Qty: 9 | Refills: 1 | Status: ACTIVE | COMMUNITY
Start: 2023-03-09 | End: 1900-01-01

## 2024-06-14 ENCOUNTER — APPOINTMENT (OUTPATIENT)
Dept: ENDOCRINOLOGY | Facility: CLINIC | Age: 74
End: 2024-06-14
Payer: MEDICARE

## 2024-06-14 VITALS
BODY MASS INDEX: 25.66 KG/M2 | OXYGEN SATURATION: 96 % | SYSTOLIC BLOOD PRESSURE: 122 MMHG | HEIGHT: 65 IN | HEART RATE: 60 BPM | WEIGHT: 154 LBS | DIASTOLIC BLOOD PRESSURE: 60 MMHG

## 2024-06-14 DIAGNOSIS — E03.9 HYPOTHYROIDISM, UNSPECIFIED: ICD-10-CM

## 2024-06-14 DIAGNOSIS — E11.9 TYPE 2 DIABETES MELLITUS W/OUT COMPLICATIONS: ICD-10-CM

## 2024-06-14 LAB
HBA1C MFR BLD HPLC: 5.8
LDLC SERPL DIRECT ASSAY-MCNC: 128
MICROALBUMIN/CREAT 24H UR-RTO: 25

## 2024-06-14 PROCEDURE — 95251 CONT GLUC MNTR ANALYSIS I&R: CPT

## 2024-06-14 PROCEDURE — 82962 GLUCOSE BLOOD TEST: CPT

## 2024-06-14 PROCEDURE — 99214 OFFICE O/P EST MOD 30 MIN: CPT

## 2024-06-14 PROCEDURE — G2211 COMPLEX E/M VISIT ADD ON: CPT

## 2024-06-17 LAB — GLUCOSE BLDC GLUCOMTR-MCNC: 98

## 2024-06-17 NOTE — ASSESSMENT
[Long Term Vascular Complications] : long term vascular complications of diabetes [Importance of Diet and Exercise] : importance of diet and exercise to improve glycemic control, achieve weight loss and improve cardiovascular health [Exercise/Effect on Glucose] : exercise/effect on glucose [Retinopathy Screening] : Patient was referred to ophthalmology for retinopathy screening [Levothyroxine] : The patient was instructed to take Levothyroxine on an empty stomach, separate from vitamins, and wait at least 30 minutes before eating [FreeTextEntry1] : Cecil is a 74 yr old male, who presents for follow up in regards type 2 diabetes and PTC s/p total thyroidectomy in 2018.   T2DM -Reviewed risk/complication of uncontrolled DM  -Increase exercise as tolerated 5 days a week x 30 mins/day -Increase dietary efforts, low carb/low sugar -Needs to scan Eula more often -Repeat HgbA1C prior to next appt  Continue MFN, Jardiance, and ozempic   Hypothyroidism s/p total thyroidectomy in 2018, Goal TSH in <0.1  TSH was 0.210 in 4/22 He is being followed up by Dr. Duvall in [place]. Will have him continue his follow up with him. Continue LT4 137 mcg QD   RTO in 4 months MD

## 2024-06-17 NOTE — HISTORY OF PRESENT ILLNESS
[FreeTextEntry1] : He was in [facility] for CABG on 4/11/22. Now had cervical laminoplasty October 2022 for  Quality: T2DM Severity: moderate  Duration:  10+ years Modifying Factors: oral meds and GLP-1  SMBG CGM downloaded and reviewed: Eula 2 Average glucose:99 % time CGM active: 20% Glucose variability (target <36%): 28.2 % VERY HIGH (>250): 0 % HIGH (181-250): 3 % TARGET (): 95 % LOW (54-69): 2 % VERY LOW (<54): 0 Interpretation : Overall good control, needs to scan more often   HgbA1C: 5.8%  Current Regimen: metformin 1000 mg bid jardiance 10 mg daily Ozempic 0.5 mg weekly   Eye Exam: 4/2024, had cataracts, no DR  Foot Exam: + neuropathy, following neuologist  Kidney Disease: none Heart Disease: CABG 4/11/2022, following cardiology  Weight: stable  Diet: trying to watch  Exercise: PT twice a week  Smoking:  none

## 2024-07-15 ENCOUNTER — NON-APPOINTMENT (OUTPATIENT)
Age: 74
End: 2024-07-15

## 2024-07-15 ENCOUNTER — APPOINTMENT (OUTPATIENT)
Dept: OPHTHALMOLOGY | Facility: CLINIC | Age: 74
End: 2024-07-15
Payer: MEDICARE

## 2024-07-15 PROCEDURE — 92014 COMPRE OPH EXAM EST PT 1/>: CPT

## 2024-09-04 ENCOUNTER — APPOINTMENT (OUTPATIENT)
Dept: GERIATRICS | Facility: CLINIC | Age: 74
End: 2024-09-04
Payer: MEDICARE

## 2024-09-04 VITALS
DIASTOLIC BLOOD PRESSURE: 65 MMHG | RESPIRATION RATE: 16 BRPM | TEMPERATURE: 96.2 F | HEART RATE: 69 BPM | OXYGEN SATURATION: 94 % | SYSTOLIC BLOOD PRESSURE: 110 MMHG | HEIGHT: 65 IN | WEIGHT: 154.98 LBS | BODY MASS INDEX: 25.82 KG/M2

## 2024-09-04 DIAGNOSIS — F32.A DEPRESSION, UNSPECIFIED: ICD-10-CM

## 2024-09-04 DIAGNOSIS — Z87.39 PERSONAL HISTORY OF OTHER DISEASES OF THE MUSCULOSKELETAL SYSTEM AND CONNECTIVE TISSUE: ICD-10-CM

## 2024-09-04 DIAGNOSIS — Z71.89 OTHER SPECIFIED COUNSELING: ICD-10-CM

## 2024-09-04 DIAGNOSIS — Z00.00 ENCOUNTER FOR GENERAL ADULT MEDICAL EXAMINATION W/OUT ABNORMAL FINDINGS: ICD-10-CM

## 2024-09-04 PROCEDURE — G0438: CPT

## 2024-09-04 PROCEDURE — 99204 OFFICE O/P NEW MOD 45 MIN: CPT | Mod: 25

## 2024-09-04 PROCEDURE — 99497 ADVNCD CARE PLAN 30 MIN: CPT

## 2024-09-04 RX ORDER — SERTRALINE HYDROCHLORIDE 100 MG/1
100 TABLET, FILM COATED ORAL
Qty: 90 | Refills: 0 | Status: ACTIVE | COMMUNITY
Start: 2024-09-04 | End: 1900-01-01

## 2024-09-05 PROBLEM — Z87.39 HISTORY OF FOOT DROP: Status: RESOLVED | Noted: 2024-09-05 | Resolved: 2024-09-05

## 2024-09-05 PROBLEM — Z71.89 ADVANCED CARE PLANNING/COUNSELING DISCUSSION: Status: ACTIVE | Noted: 2024-09-05

## 2024-09-05 PROBLEM — Z00.00 MEDICARE ANNUAL WELLNESS VISIT, INITIAL: Status: ACTIVE | Noted: 2024-09-05

## 2024-09-05 NOTE — PHYSICAL EXAM
[Alert] : alert [Well Nourished] : well nourished [No Acute Distress] : in no acute distress [Well Developed] : well developed [Sclera] : the sclera and conjunctiva were normal [EOMI] : extraocular movements were intact [PERRL] : pupils were equal in size, round, and reactive to light [Normal Outer Ear/Nose] : the ears and nose were normal in appearance [Normal Appearance] : the appearance of the neck was normal [Supple] : the neck was supple [No Respiratory Distress] : no respiratory distress [No Acc Muscle Use] : no accessory muscle use [Respiration, Rhythm And Depth] : normal respiratory rhythm and effort [Auscultation Breath Sounds / Voice Sounds] : lungs were clear to auscultation bilaterally [Normal S1, S2] : normal S1 and S2 [Heart Rate And Rhythm] : heart rate was normal and rhythm regular [Edema] : edema was not present [Pedal Pulses Normal] : the pedal pulses are present [Bowel Sounds] : normal bowel sounds [Abdomen Tenderness] : non-tender [Abdomen Soft] : soft [No Spinal Tenderness] : no spinal tenderness [Normal Color / Pigmentation] : normal skin color and pigmentation [Oriented To Time, Place, And Person] : oriented to person, place, and time [Normal Affect] : the affect was normal [de-identified] : Foot drop, ataxic gait. Ataxia with finger to nose testing. Intact strength and sensation otherwise. Bell's palsy present on right face.  [de-identified] : Depressed move

## 2024-09-05 NOTE — HISTORY OF PRESENT ILLNESS
[Patient reported Patient reported dental screening is normal] : Patient reported dental screening is normal [Patient reported skin cancer screening was normal] : Patient reported skin cancer screening was normal [0] : 0 [Retired] : retired from work [Smoking Status Reviewed and Updated] : Smoking status was reviewed and updated [PMH Reviewed and Updated] : past medical history reviewed and updated [PSH Reviewed and Updated] : past surgical history reviewed and updated [Family History Reviewed and Updated] : family history reviewed and updated [Medication and Allergies Reconciled] : medication and allergies reconciled [Over the Past 2 Weeks, Have You Felt Down, Depressed, or Hopeless?] : 1.) Over the past 2 weeks, have you felt down, depressed, or hopeless? Yes [Over the Past 2 Weeks, Have You Felt Little Interest or Pleasure Doing Things?] : 2.) Over the past 2 weeks, have you felt little interest or pleasure doing things? Yes [None] : The patient has no concerns about alcohol abuse [Never] : has never used illicit drugs [General Adherence] : and is generally adherent [Can not Exercise (Disability)] : Exercise: The patient can not exercise due to disability [Walking] : walking [Compliant with medications] : compliant with medications [Adequate] : adequate [Spouse] : spouse [Fully Independent] : fully independent [Drives without concerns] : drives without concerns [History of falls] : history of falls [Seatbelts] : seatbelts [Bicycle Helmet] : bicycle helmet [Safe Driving Habits] : safe driving habits [Smoke Detectors] : smoke detectors [Advanced Directives Discussed] : discussed at today's visit [Advanced Directives Previoulsy Documented] : Advanced Directives was previously documented [Patient Has Full Capacity] : this patient has full decision making capacity for discussion of advance care planning [Patient Has Documented Advanced Directive/Health Proxy but did not bring paperwork to Office Visit] : Patient has documented Advanced Directive/Health Proxy but did not bring paperwork to office visit [3] : 1) Little interest or pleasure doing things for nearly every day (3) [2] : 2) Feeling down, depressed, or hopeless for more than half of the days (2) [PHQ-2 Positive] : PHQ-2 Positive [Nearly Every Day (3)] : 1.) Little interest or pleasure in doing things? Nearly every day [1/2 of Days or More (2)] : 4.) Feeling tired or having little energy? Half the days or more [Not at All (0)] : 9.) Thoughts that you would be off dead or of hurting yourself in some way? Not at all [Mild] : Severity of Depression is Mild [Somewhat Difficult] : How difficult have these problems made it for you to do your work, take care of things at home, or get along with people? Somewhat difficult [PHQ-9 Positive] : PHQ-9 Positive [I have developed a follow-up plan documented below in the note.] : I have developed a follow-up plan documented below in the note. [Two or more falls in past year] : Patient reported two or more falls in the past year [Patient is independent with] : bathing [Completely Independent] : Completely independent. [] : managing medications [Independent] : managing finances [Cane] : cane [Walker] : walker [Smoke Detector] : smoke detector [Carbon Monoxide Detector] : carbon monoxide detector [Night Light] : night light [Wears Seat Belt] : wears seat belt [NO] : No [Patient/Caregiver unclear of wishes] : , patient/caregiver unclear of wishes [Reviewed no changes] : Reviewed, no changes [Designated Healthcare Proxy] : Designated healthcare proxy [Name: ___] : Health Care Proxy's Name: [unfilled]  [Relationship: ___] : Relationship: [unfilled] [Aggressive treatment] : aggressive treatment [DNR] : DNR [I will adhere to the patient's wishes.] : I will adhere to the patient's wishes. [Time Spent: ___ minutes] : Time Spent: [unfilled] minutes [Patient reported hearing was normal] : Patient reported hearing was normal [Patient reported vision is normal] : Patient reported vision is normal [Patient reported colon/rectal/cancer screening was normal] : Patient reported colon/rectal cancer screening was normal [Patient reported skin cancer screening was abnormal] : Patient reported skin cancer screening was abnormal [Patient Concern] : no personal concern about alcohol use [Family Concern] : no family concern about alcohol use [Tolerance to Alcohol] : no tolerance to alcohol [Annoyed by Criticism] : no annoyance by criticism of alcohol use [Attempts to Cut Down] : no attempts to cut alcohol use [Morning Drinking] : no morning drinking [Unable To Manage Meds] : ability to manage ~his/her~ medications [Intolerance] : no intolerance [Med Instructions Not Understood] : understanding medication instructions ["Doesn't Seem To Help"] : belief that the medication is helping [Bathroom Grab Bars] : not using bathroom grab bars [CPR Training for Household] : did not receive CPR training for patient [Sunscreen] : not using sunscreen [CPR Training for Patient] : did not receive CPR training for patient [de-identified] : Quit smoking in 1983 after his heart attack [de-identified] : Drinks 1-2 drinks daily. Discussed that we recommend no more than 7 drinks a week for our patients. Patient was amenable to discussion.  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 Can walk with difficulty, Does participate in physical therapy 2x per week  [de-identified] : Patient noted that he would like to be DNR as if his heart stopped, he would not want to be resuscitated, however his wife noted that she was concerned that she would want to try something first. She relayed the experience of her mother getting ill with volume overload and received Lasix with improvement in her symptoms. Discussed how a DNR decision does not mean that we won't give him full treatment but essentially only applies if his heart were to stop. Patient reaffirmed that he thinks he would prefer not to be resuscitated if his heart were to stop beating but would like to talk it over more with his wife to allow her to understand his perspective. Discussed that there is an option for a MOLST form which they could keep around the house for emergency situations. They noted that they would like to review it further and rediscuss at our next follow up appointment. They noted that they had completed documentation with their  and that he has named his wife as his primary health care proxy however will send in the documentation so that we can have it on file.  [BoneDensityDate] : 09/2024 [TextBox_37] : In office vision: R 20/30 and L 20/25. B/l: 20/25 [FreeTextEntry2] : Precancerous lesions removed from scalp [Stair Lift] : no stair lift used in home [FreeTextEntry8] : Incontinence has resolved recently. 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  [FreeTextEntry4] : Patient noted that he would like to be DNR as if his heart stopped, he would not want to be resuscitated, however his wife noted that she was concerned that she would want to try something first. She relayed the experience of her mother getting ill with volume overload and received Lasix with improvement in her symptoms. Discussed how a DNR decision does not mean that we won't give him full treatment but essentially only applies if his heart were to stop. Patient reaffirmed that he thinks he would prefer not to be resuscitated if his heart were to stop beating but would like to talk it over more with his wife to allow her to understand his perspective. Discussed that there is an option for a MOLST form which they could keep around the house for emergency situations. They noted that they would like to review it further and rediscuss at our next follow up appointment. They noted that they had completed documentation with their  and that he has named his wife as his primary health care proxy however will send in the documentation so that we can have it on file.  [FreeTextEntry1] : Cecil Olvera is a 74 year old M with a history of T2DM, thyroid cancer s/p total thyroidectomy (2018), CAD s/p CABG (2022), HLD, HTN and cervical spine stenosis with myelopathy s/p cervical laminoplasty (2022); hx of gastric bypass who presents here to establish care.      Dr Barber -  [facility] for thytroid  Other specialists:  - Endocrinology: Dr Limon - Ophthalmology: - Neurosurgery: Dr Arianna Dodson  -  Cardiology -  [facility]  Gastric bypass surgery  -  2010   Summer 0  [facility] sepsis and had did  placed a stent and then followed with having the stent; GI follow up - Dr Yen -  with [facility].   Dr Justice dinh othptlamology -  cataracts sutrery recentely.   Dr Blandon dermatologist    concern for normal pressure hydrocephalus, can't ewakl issues with golf an hking and sits on the couh all daty. has ataxia, and noted that his quality of life has diminished so muhc. Believes there is a rason that he is having so many difficulty with walker.   Concners   go to PT two times week.       Had the two shot series; Flu and RSV, COVDI boosters    Pneumonia shot,2015 got two shots.,  [Mammogramdate] : 2024 [Grab Bars] : no grab bars [Shower Chair] : no shower chair [Driving Concerns] : not driving or driving without noted concerns [Wears Sunscreen] : does not wear sunscreen [[id number]] : 5 [[id number]] : 7 [AdvancecareDate] : 09/24

## 2024-09-05 NOTE — HISTORY OF PRESENT ILLNESS
[Patient reported Patient reported dental screening is normal] : Patient reported dental screening is normal [Patient reported skin cancer screening was normal] : Patient reported skin cancer screening was normal [0] : 0 [Retired] : retired from work [Smoking Status Reviewed and Updated] : Smoking status was reviewed and updated [PMH Reviewed and Updated] : past medical history reviewed and updated [PSH Reviewed and Updated] : past surgical history reviewed and updated [Family History Reviewed and Updated] : family history reviewed and updated [Medication and Allergies Reconciled] : medication and allergies reconciled [Over the Past 2 Weeks, Have You Felt Down, Depressed, or Hopeless?] : 1.) Over the past 2 weeks, have you felt down, depressed, or hopeless? Yes [Over the Past 2 Weeks, Have You Felt Little Interest or Pleasure Doing Things?] : 2.) Over the past 2 weeks, have you felt little interest or pleasure doing things? Yes [None] : The patient has no concerns about alcohol abuse [Never] : has never used illicit drugs [General Adherence] : and is generally adherent [Can not Exercise (Disability)] : Exercise: The patient can not exercise due to disability [Walking] : walking [Compliant with medications] : compliant with medications [Adequate] : adequate [Spouse] : spouse [Fully Independent] : fully independent [Drives without concerns] : drives without concerns [History of falls] : history of falls [Seatbelts] : seatbelts [Bicycle Helmet] : bicycle helmet [Safe Driving Habits] : safe driving habits [Smoke Detectors] : smoke detectors [Advanced Directives Discussed] : discussed at today's visit [Advanced Directives Previoulsy Documented] : Advanced Directives was previously documented [Patient Has Full Capacity] : this patient has full decision making capacity for discussion of advance care planning [Patient Has Documented Advanced Directive/Health Proxy but did not bring paperwork to Office Visit] : Patient has documented Advanced Directive/Health Proxy but did not bring paperwork to office visit [3] : 1) Little interest or pleasure doing things for nearly every day (3) [2] : 2) Feeling down, depressed, or hopeless for more than half of the days (2) [PHQ-2 Positive] : PHQ-2 Positive [Nearly Every Day (3)] : 1.) Little interest or pleasure in doing things? Nearly every day [1/2 of Days or More (2)] : 4.) Feeling tired or having little energy? Half the days or more [Not at All (0)] : 9.) Thoughts that you would be off dead or of hurting yourself in some way? Not at all [Mild] : Severity of Depression is Mild [Somewhat Difficult] : How difficult have these problems made it for you to do your work, take care of things at home, or get along with people? Somewhat difficult [PHQ-9 Positive] : PHQ-9 Positive [I have developed a follow-up plan documented below in the note.] : I have developed a follow-up plan documented below in the note. [Two or more falls in past year] : Patient reported two or more falls in the past year [Patient is independent with] : bathing [Completely Independent] : Completely independent. [] : managing medications [Independent] : managing finances [Cane] : cane [Walker] : walker [Smoke Detector] : smoke detector [Carbon Monoxide Detector] : carbon monoxide detector [Night Light] : night light [Wears Seat Belt] : wears seat belt [NO] : No [Patient/Caregiver unclear of wishes] : , patient/caregiver unclear of wishes [Reviewed no changes] : Reviewed, no changes [Designated Healthcare Proxy] : Designated healthcare proxy [Name: ___] : Health Care Proxy's Name: [unfilled]  [Relationship: ___] : Relationship: [unfilled] [Aggressive treatment] : aggressive treatment [DNR] : DNR [I will adhere to the patient's wishes.] : I will adhere to the patient's wishes. [Time Spent: ___ minutes] : Time Spent: [unfilled] minutes [Patient reported hearing was normal] : Patient reported hearing was normal [Patient reported vision is normal] : Patient reported vision is normal [Patient reported colon/rectal/cancer screening was normal] : Patient reported colon/rectal cancer screening was normal [Patient reported skin cancer screening was abnormal] : Patient reported skin cancer screening was abnormal [Patient Concern] : no personal concern about alcohol use [Family Concern] : no family concern about alcohol use [Tolerance to Alcohol] : no tolerance to alcohol [Annoyed by Criticism] : no annoyance by criticism of alcohol use [Attempts to Cut Down] : no attempts to cut alcohol use [Morning Drinking] : no morning drinking [Unable To Manage Meds] : ability to manage ~his/her~ medications [Intolerance] : no intolerance [Med Instructions Not Understood] : understanding medication instructions ["Doesn't Seem To Help"] : belief that the medication is helping [Bathroom Grab Bars] : not using bathroom grab bars [CPR Training for Household] : did not receive CPR training for patient [Sunscreen] : not using sunscreen [CPR Training for Patient] : did not receive CPR training for patient [de-identified] : Quit smoking in 1983 after his heart attack [de-identified] : Drinks 1-2 drinks daily. Discussed that we recommend no more than 7 drinks a week for our patients. Patient was amenable to discussion.  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 Can walk with difficulty, Does participate in physical therapy 2x per week  [de-identified] : Patient noted that he would like to be DNR as if his heart stopped, he would not want to be resuscitated, however his wife noted that she was concerned that she would want to try something first. She relayed the experience of her mother getting ill with volume overload and received Lasix with improvement in her symptoms. Discussed how a DNR decision does not mean that we won't give him full treatment but essentially only applies if his heart were to stop. Patient reaffirmed that he thinks he would prefer not to be resuscitated if his heart were to stop beating but would like to talk it over more with his wife to allow her to understand his perspective. Discussed that there is an option for a MOLST form which they could keep around the house for emergency situations. They noted that they would like to review it further and rediscuss at our next follow up appointment. They noted that they had completed documentation with their  and that he has named his wife as his primary health care proxy however will send in the documentation so that we can have it on file.  [BoneDensityDate] : 09/2024 [TextBox_37] : In office vision: R 20/30 and L 20/25. B/l: 20/25 [FreeTextEntry2] : Precancerous lesions removed from scalp [Stair Lift] : no stair lift used in home [FreeTextEntry8] : Incontinence has resolved recently. 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  [FreeTextEntry4] : Patient noted that he would like to be DNR as if his heart stopped, he would not want to be resuscitated, however his wife noted that she was concerned that she would want to try something first. She relayed the experience of her mother getting ill with volume overload and received Lasix with improvement in her symptoms. Discussed how a DNR decision does not mean that we won't give him full treatment but essentially only applies if his heart were to stop. Patient reaffirmed that he thinks he would prefer not to be resuscitated if his heart were to stop beating but would like to talk it over more with his wife to allow her to understand his perspective. Discussed that there is an option for a MOLST form which they could keep around the house for emergency situations. They noted that they would like to review it further and rediscuss at our next follow up appointment. They noted that they had completed documentation with their  and that he has named his wife as his primary health care proxy however will send in the documentation so that we can have it on file.  [FreeTextEntry1] : Cecil Olvera is a 74 year old M with a history of T2DM, thyroid cancer s/p total thyroidectomy (2018), CAD s/p CABG (2022), HLD, HTN and cervical spine stenosis with myelopathy s/p cervical laminoplasty (2022); hx of gastric bypass who presents here to establish care.      Dr Barber -  [facility] for thytroid  Other specialists:  - Endocrinology: Dr Limon - Ophthalmology: - Neurosurgery: Dr Arianna Dodson  -  Cardiology -  [facility]  Gastric bypass surgery  -  2010   Summer 0  [facility] sepsis and had did  placed a stent and then followed with having the stent; GI follow up - Dr Yen -  with [facility].   Dr Justice dinh othptlamology -  cataracts sutrery recentely.   Dr Blandon dermatologist    concern for normal pressure hydrocephalus, can't ewakl issues with golf an hking and sits on the couh all daty. has ataxia, and noted that his quality of life has diminished so muhc. Believes there is a rason that he is having so many difficulty with walker.   Concners   go to PT two times week.       Had the two shot series; Flu and RSV, COVDI boosters    Pneumonia shot,2015 got two shots.,  [Mammogramdate] : 2024 [Grab Bars] : no grab bars [Shower Chair] : no shower chair [Driving Concerns] : not driving or driving without noted concerns [Wears Sunscreen] : does not wear sunscreen [[id number]] : 5 [[id number]] : 7 [AdvancecareDate] : 09/24

## 2024-09-05 NOTE — PHYSICAL EXAM
[Alert] : alert [Well Nourished] : well nourished [No Acute Distress] : in no acute distress [Well Developed] : well developed [Sclera] : the sclera and conjunctiva were normal [EOMI] : extraocular movements were intact [PERRL] : pupils were equal in size, round, and reactive to light [Normal Outer Ear/Nose] : the ears and nose were normal in appearance [Normal Appearance] : the appearance of the neck was normal [Supple] : the neck was supple [No Respiratory Distress] : no respiratory distress [No Acc Muscle Use] : no accessory muscle use [Respiration, Rhythm And Depth] : normal respiratory rhythm and effort [Auscultation Breath Sounds / Voice Sounds] : lungs were clear to auscultation bilaterally [Normal S1, S2] : normal S1 and S2 [Heart Rate And Rhythm] : heart rate was normal and rhythm regular [Edema] : edema was not present [Pedal Pulses Normal] : the pedal pulses are present [Bowel Sounds] : normal bowel sounds [Abdomen Tenderness] : non-tender [Abdomen Soft] : soft [No Spinal Tenderness] : no spinal tenderness [Normal Color / Pigmentation] : normal skin color and pigmentation [Oriented To Time, Place, And Person] : oriented to person, place, and time [Normal Affect] : the affect was normal [de-identified] : Foot drop, ataxic gait. Ataxia with finger to nose testing. Intact strength and sensation otherwise. Bell's palsy present on right face.  [de-identified] : Depressed move

## 2024-09-05 NOTE — REASON FOR VISIT
[Initial Evaluation] : an initial evaluation [Spouse] : spouse [FreeTextEntry1] : Initial Medicare AWV, Establish Care

## 2024-09-05 NOTE — PHYSICAL EXAM
[Alert] : alert [Well Nourished] : well nourished [No Acute Distress] : in no acute distress [Well Developed] : well developed [Sclera] : the sclera and conjunctiva were normal [EOMI] : extraocular movements were intact [PERRL] : pupils were equal in size, round, and reactive to light [Normal Outer Ear/Nose] : the ears and nose were normal in appearance [Normal Appearance] : the appearance of the neck was normal [Supple] : the neck was supple [No Respiratory Distress] : no respiratory distress [No Acc Muscle Use] : no accessory muscle use [Respiration, Rhythm And Depth] : normal respiratory rhythm and effort [Auscultation Breath Sounds / Voice Sounds] : lungs were clear to auscultation bilaterally [Normal S1, S2] : normal S1 and S2 [Heart Rate And Rhythm] : heart rate was normal and rhythm regular [Edema] : edema was not present [Pedal Pulses Normal] : the pedal pulses are present [Bowel Sounds] : normal bowel sounds [Abdomen Tenderness] : non-tender [Abdomen Soft] : soft [No Spinal Tenderness] : no spinal tenderness [Normal Color / Pigmentation] : normal skin color and pigmentation [Oriented To Time, Place, And Person] : oriented to person, place, and time [Normal Affect] : the affect was normal [de-identified] : Foot drop, ataxic gait. Ataxia with finger to nose testing. Intact strength and sensation otherwise. Bell's palsy present on right face.  [de-identified] : Depressed move

## 2024-09-05 NOTE — HISTORY OF PRESENT ILLNESS
[Patient reported Patient reported dental screening is normal] : Patient reported dental screening is normal [Patient reported skin cancer screening was normal] : Patient reported skin cancer screening was normal [0] : 0 [Retired] : retired from work [Smoking Status Reviewed and Updated] : Smoking status was reviewed and updated [PMH Reviewed and Updated] : past medical history reviewed and updated [PSH Reviewed and Updated] : past surgical history reviewed and updated [Family History Reviewed and Updated] : family history reviewed and updated [Medication and Allergies Reconciled] : medication and allergies reconciled [Over the Past 2 Weeks, Have You Felt Down, Depressed, or Hopeless?] : 1.) Over the past 2 weeks, have you felt down, depressed, or hopeless? Yes [Over the Past 2 Weeks, Have You Felt Little Interest or Pleasure Doing Things?] : 2.) Over the past 2 weeks, have you felt little interest or pleasure doing things? Yes [None] : The patient has no concerns about alcohol abuse [Never] : has never used illicit drugs [General Adherence] : and is generally adherent [Can not Exercise (Disability)] : Exercise: The patient can not exercise due to disability [Walking] : walking [Compliant with medications] : compliant with medications [Adequate] : adequate [Spouse] : spouse [Fully Independent] : fully independent [Drives without concerns] : drives without concerns [History of falls] : history of falls [Seatbelts] : seatbelts [Bicycle Helmet] : bicycle helmet [Safe Driving Habits] : safe driving habits [Smoke Detectors] : smoke detectors [Advanced Directives Discussed] : discussed at today's visit [Advanced Directives Previoulsy Documented] : Advanced Directives was previously documented [Patient Has Full Capacity] : this patient has full decision making capacity for discussion of advance care planning [Patient Has Documented Advanced Directive/Health Proxy but did not bring paperwork to Office Visit] : Patient has documented Advanced Directive/Health Proxy but did not bring paperwork to office visit [3] : 1) Little interest or pleasure doing things for nearly every day (3) [2] : 2) Feeling down, depressed, or hopeless for more than half of the days (2) [PHQ-2 Positive] : PHQ-2 Positive [Nearly Every Day (3)] : 1.) Little interest or pleasure in doing things? Nearly every day [1/2 of Days or More (2)] : 4.) Feeling tired or having little energy? Half the days or more [Not at All (0)] : 9.) Thoughts that you would be off dead or of hurting yourself in some way? Not at all [Mild] : Severity of Depression is Mild [Somewhat Difficult] : How difficult have these problems made it for you to do your work, take care of things at home, or get along with people? Somewhat difficult [PHQ-9 Positive] : PHQ-9 Positive [I have developed a follow-up plan documented below in the note.] : I have developed a follow-up plan documented below in the note. [Two or more falls in past year] : Patient reported two or more falls in the past year [Patient is independent with] : bathing [Completely Independent] : Completely independent. [] : managing medications [Independent] : managing finances [Cane] : cane [Walker] : walker [Smoke Detector] : smoke detector [Carbon Monoxide Detector] : carbon monoxide detector [Night Light] : night light [Wears Seat Belt] : wears seat belt [NO] : No [Patient/Caregiver unclear of wishes] : , patient/caregiver unclear of wishes [Reviewed no changes] : Reviewed, no changes [Designated Healthcare Proxy] : Designated healthcare proxy [Name: ___] : Health Care Proxy's Name: [unfilled]  [Relationship: ___] : Relationship: [unfilled] [Aggressive treatment] : aggressive treatment [DNR] : DNR [I will adhere to the patient's wishes.] : I will adhere to the patient's wishes. [Time Spent: ___ minutes] : Time Spent: [unfilled] minutes [Patient reported hearing was normal] : Patient reported hearing was normal [Patient reported vision is normal] : Patient reported vision is normal [Patient reported colon/rectal/cancer screening was normal] : Patient reported colon/rectal cancer screening was normal [Patient reported skin cancer screening was abnormal] : Patient reported skin cancer screening was abnormal [Patient Concern] : no personal concern about alcohol use [Family Concern] : no family concern about alcohol use [Tolerance to Alcohol] : no tolerance to alcohol [Annoyed by Criticism] : no annoyance by criticism of alcohol use [Attempts to Cut Down] : no attempts to cut alcohol use [Morning Drinking] : no morning drinking [Unable To Manage Meds] : ability to manage ~his/her~ medications [Intolerance] : no intolerance [Med Instructions Not Understood] : understanding medication instructions ["Doesn't Seem To Help"] : belief that the medication is helping [Bathroom Grab Bars] : not using bathroom grab bars [CPR Training for Household] : did not receive CPR training for patient [Sunscreen] : not using sunscreen [CPR Training for Patient] : did not receive CPR training for patient [de-identified] : Quit smoking in 1983 after his heart attack [de-identified] : Drinks 1-2 drinks daily. Discussed that we recommend no more than 7 drinks a week for our patients. Patient was amenable to discussion.  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 Can walk with difficulty, Does participate in physical therapy 2x per week  [de-identified] : Patient noted that he would like to be DNR as if his heart stopped, he would not want to be resuscitated, however his wife noted that she was concerned that she would want to try something first. She relayed the experience of her mother getting ill with volume overload and received Lasix with improvement in her symptoms. Discussed how a DNR decision does not mean that we won't give him full treatment but essentially only applies if his heart were to stop. Patient reaffirmed that he thinks he would prefer not to be resuscitated if his heart were to stop beating but would like to talk it over more with his wife to allow her to understand his perspective. Discussed that there is an option for a MOLST form which they could keep around the house for emergency situations. They noted that they would like to review it further and rediscuss at our next follow up appointment. They noted that they had completed documentation with their  and that he has named his wife as his primary health care proxy however will send in the documentation so that we can have it on file.  [BoneDensityDate] : 09/2024 [TextBox_37] : In office vision: R 20/30 and L 20/25. B/l: 20/25 [FreeTextEntry2] : Precancerous lesions removed from scalp [Stair Lift] : no stair lift used in home [FreeTextEntry8] : Incontinence has resolved recently. 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  [FreeTextEntry4] : Patient noted that he would like to be DNR as if his heart stopped, he would not want to be resuscitated, however his wife noted that she was concerned that she would want to try something first. She relayed the experience of her mother getting ill with volume overload and received Lasix with improvement in her symptoms. Discussed how a DNR decision does not mean that we won't give him full treatment but essentially only applies if his heart were to stop. Patient reaffirmed that he thinks he would prefer not to be resuscitated if his heart were to stop beating but would like to talk it over more with his wife to allow her to understand his perspective. Discussed that there is an option for a MOLST form which they could keep around the house for emergency situations. They noted that they would like to review it further and rediscuss at our next follow up appointment. They noted that they had completed documentation with their  and that he has named his wife as his primary health care proxy however will send in the documentation so that we can have it on file.  [FreeTextEntry1] : Cecil Olvera is a 74 year old M with a history of T2DM, thyroid cancer s/p total thyroidectomy (2018), CAD s/p CABG (2022), HLD, HTN and cervical spine stenosis with myelopathy s/p cervical laminoplasty (2022); hx of gastric bypass who presents here to establish care.      Dr Barber -  [facility] for thytroid  Other specialists:  - Endocrinology: Dr Limon - Ophthalmology: - Neurosurgery: Dr Arianna Dodson  -  Cardiology -  [facility]  Gastric bypass surgery  -  2010   Summer 0  [facility] sepsis and had did  placed a stent and then followed with having the stent; GI follow up - Dr Yen -  with [facility].   Dr Justice dinh othptlamology -  cataracts sutrery recentely.   Dr Blandon dermatologist    concern for normal pressure hydrocephalus, can't ewakl issues with golf an hking and sits on the couh all daty. has ataxia, and noted that his quality of life has diminished so muhc. Believes there is a rason that he is having so many difficulty with walker.   Concners   go to PT two times week.       Had the two shot series; Flu and RSV, COVDI boosters    Pneumonia shot,2015 got two shots.,  [Mammogramdate] : 2024 [Grab Bars] : no grab bars [Shower Chair] : no shower chair [Driving Concerns] : not driving or driving without noted concerns [Wears Sunscreen] : does not wear sunscreen [EGG7Rtpez] : 5 [[id number]] : 7 [AdvancecareDate] : 09/24

## 2024-09-05 NOTE — PHYSICAL EXAM
[Alert] : alert [Well Nourished] : well nourished [No Acute Distress] : in no acute distress [Well Developed] : well developed [Sclera] : the sclera and conjunctiva were normal [EOMI] : extraocular movements were intact [PERRL] : pupils were equal in size, round, and reactive to light [Normal Outer Ear/Nose] : the ears and nose were normal in appearance [Normal Appearance] : the appearance of the neck was normal [Supple] : the neck was supple [No Respiratory Distress] : no respiratory distress [No Acc Muscle Use] : no accessory muscle use [Respiration, Rhythm And Depth] : normal respiratory rhythm and effort [Auscultation Breath Sounds / Voice Sounds] : lungs were clear to auscultation bilaterally [Normal S1, S2] : normal S1 and S2 [Heart Rate And Rhythm] : heart rate was normal and rhythm regular [Edema] : edema was not present [Pedal Pulses Normal] : the pedal pulses are present [Bowel Sounds] : normal bowel sounds [Abdomen Tenderness] : non-tender [Abdomen Soft] : soft [No Spinal Tenderness] : no spinal tenderness [Normal Color / Pigmentation] : normal skin color and pigmentation [Oriented To Time, Place, And Person] : oriented to person, place, and time [Normal Affect] : the affect was normal [de-identified] : Foot drop, ataxic gait. Ataxia with finger to nose testing. Intact strength and sensation otherwise. Bell's palsy present on right face.  [de-identified] : Depressed move

## 2024-09-05 NOTE — HISTORY OF PRESENT ILLNESS
[Patient reported Patient reported dental screening is normal] : Patient reported dental screening is normal [Patient reported skin cancer screening was normal] : Patient reported skin cancer screening was normal [0] : 0 [Retired] : retired from work [Smoking Status Reviewed and Updated] : Smoking status was reviewed and updated [PMH Reviewed and Updated] : past medical history reviewed and updated [PSH Reviewed and Updated] : past surgical history reviewed and updated [Family History Reviewed and Updated] : family history reviewed and updated [Medication and Allergies Reconciled] : medication and allergies reconciled [Over the Past 2 Weeks, Have You Felt Down, Depressed, or Hopeless?] : 1.) Over the past 2 weeks, have you felt down, depressed, or hopeless? Yes [Over the Past 2 Weeks, Have You Felt Little Interest or Pleasure Doing Things?] : 2.) Over the past 2 weeks, have you felt little interest or pleasure doing things? Yes [None] : The patient has no concerns about alcohol abuse [Never] : has never used illicit drugs [General Adherence] : and is generally adherent [Can not Exercise (Disability)] : Exercise: The patient can not exercise due to disability [Walking] : walking [Compliant with medications] : compliant with medications [Adequate] : adequate [Spouse] : spouse [Fully Independent] : fully independent [Drives without concerns] : drives without concerns [History of falls] : history of falls [Seatbelts] : seatbelts [Bicycle Helmet] : bicycle helmet [Safe Driving Habits] : safe driving habits [Smoke Detectors] : smoke detectors [Advanced Directives Discussed] : discussed at today's visit [Advanced Directives Previoulsy Documented] : Advanced Directives was previously documented [Patient Has Full Capacity] : this patient has full decision making capacity for discussion of advance care planning [Patient Has Documented Advanced Directive/Health Proxy but did not bring paperwork to Office Visit] : Patient has documented Advanced Directive/Health Proxy but did not bring paperwork to office visit [3] : 1) Little interest or pleasure doing things for nearly every day (3) [2] : 2) Feeling down, depressed, or hopeless for more than half of the days (2) [PHQ-2 Positive] : PHQ-2 Positive [Nearly Every Day (3)] : 1.) Little interest or pleasure in doing things? Nearly every day [1/2 of Days or More (2)] : 4.) Feeling tired or having little energy? Half the days or more [Not at All (0)] : 9.) Thoughts that you would be off dead or of hurting yourself in some way? Not at all [Mild] : Severity of Depression is Mild [Somewhat Difficult] : How difficult have these problems made it for you to do your work, take care of things at home, or get along with people? Somewhat difficult [PHQ-9 Positive] : PHQ-9 Positive [I have developed a follow-up plan documented below in the note.] : I have developed a follow-up plan documented below in the note. [Two or more falls in past year] : Patient reported two or more falls in the past year [Patient is independent with] : bathing [Completely Independent] : Completely independent. [] : managing medications [Independent] : managing finances [Cane] : cane [Walker] : walker [Smoke Detector] : smoke detector [Carbon Monoxide Detector] : carbon monoxide detector [Night Light] : night light [Wears Seat Belt] : wears seat belt [NO] : No [Patient/Caregiver unclear of wishes] : , patient/caregiver unclear of wishes [Reviewed no changes] : Reviewed, no changes [Designated Healthcare Proxy] : Designated healthcare proxy [Name: ___] : Health Care Proxy's Name: [unfilled]  [Relationship: ___] : Relationship: [unfilled] [Aggressive treatment] : aggressive treatment [DNR] : DNR [I will adhere to the patient's wishes.] : I will adhere to the patient's wishes. [Time Spent: ___ minutes] : Time Spent: [unfilled] minutes [Patient reported hearing was normal] : Patient reported hearing was normal [Patient reported vision is normal] : Patient reported vision is normal [Patient reported colon/rectal/cancer screening was normal] : Patient reported colon/rectal cancer screening was normal [Patient reported skin cancer screening was abnormal] : Patient reported skin cancer screening was abnormal [Patient Concern] : no personal concern about alcohol use [Family Concern] : no family concern about alcohol use [Tolerance to Alcohol] : no tolerance to alcohol [Annoyed by Criticism] : no annoyance by criticism of alcohol use [Attempts to Cut Down] : no attempts to cut alcohol use [Morning Drinking] : no morning drinking [Unable To Manage Meds] : ability to manage ~his/her~ medications [Intolerance] : no intolerance [Med Instructions Not Understood] : understanding medication instructions ["Doesn't Seem To Help"] : belief that the medication is helping [Bathroom Grab Bars] : not using bathroom grab bars [CPR Training for Household] : did not receive CPR training for patient [Sunscreen] : not using sunscreen [CPR Training for Patient] : did not receive CPR training for patient [de-identified] : Quit smoking in 1983 after his heart attack [de-identified] : Drinks 1-2 drinks daily. Discussed that we recommend no more than 7 drinks a week for our patients. Patient was amenable to discussion.  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 Can walk with difficulty, Does participate in physical therapy 2x per week  [de-identified] : Patient noted that he would like to be DNR as if his heart stopped, he would not want to be resuscitated, however his wife noted that she was concerned that she would want to try something first. She relayed the experience of her mother getting ill with volume overload and received Lasix with improvement in her symptoms. Discussed how a DNR decision does not mean that we won't give him full treatment but essentially only applies if his heart were to stop. Patient reaffirmed that he thinks he would prefer not to be resuscitated if his heart were to stop beating but would like to talk it over more with his wife to allow her to understand his perspective. Discussed that there is an option for a MOLST form which they could keep around the house for emergency situations. They noted that they would like to review it further and rediscuss at our next follow up appointment. They noted that they had completed documentation with their  and that he has named his wife as his primary health care proxy however will send in the documentation so that we can have it on file.  [BoneDensityDate] : 09/2024 [TextBox_37] : In office vision: R 20/30 and L 20/25. B/l: 20/25 [FreeTextEntry2] : Precancerous lesions removed from scalp [Stair Lift] : no stair lift used in home [FreeTextEntry8] : Incontinence has resolved recently. 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  [FreeTextEntry4] : Patient noted that he would like to be DNR as if his heart stopped, he would not want to be resuscitated, however his wife noted that she was concerned that she would want to try something first. She relayed the experience of her mother getting ill with volume overload and received Lasix with improvement in her symptoms. Discussed how a DNR decision does not mean that we won't give him full treatment but essentially only applies if his heart were to stop. Patient reaffirmed that he thinks he would prefer not to be resuscitated if his heart were to stop beating but would like to talk it over more with his wife to allow her to understand his perspective. Discussed that there is an option for a MOLST form which they could keep around the house for emergency situations. They noted that they would like to review it further and rediscuss at our next follow up appointment. They noted that they had completed documentation with their  and that he has named his wife as his primary health care proxy however will send in the documentation so that we can have it on file.  [FreeTextEntry1] : Cecil Olvera is a 74 year old M with a history of T2DM, thyroid cancer s/p total thyroidectomy (2018), CAD s/p CABG (2022), HLD, HTN and cervical spine stenosis with myelopathy s/p cervical laminoplasty (2022); hx of gastric bypass who presents here to establish care.      Dr Barber -  [facility] for thytroid  Other specialists:  - Endocrinology: Dr Limon - Ophthalmology: - Neurosurgery: Dr Arianna Dodson  -  Cardiology -  [facility]  Gastric bypass surgery  -  2010   Summer 0  [facility] sepsis and had did  placed a stent and then followed with having the stent; GI follow up - Dr Yen -  with [facility].   Dr Justice dinh othptlamology -  cataracts sutrery recentely.   Dr Blandon dermatologist    concern for normal pressure hydrocephalus, can't ewakl issues with golf an hking and sits on the couh all daty. has ataxia, and noted that his quality of life has diminished so muhc. Believes there is a rason that he is having so many difficulty with walker.   Concners   go to PT two times week.       Had the two shot series; Flu and RSV, COVDI boosters    Pneumonia shot,2015 got two shots.,  [Mammogramdate] : 2024 [Grab Bars] : no grab bars [Shower Chair] : no shower chair [Driving Concerns] : not driving or driving without noted concerns [Wears Sunscreen] : does not wear sunscreen [[id number]] : 5 [[id number]] : 7 [AdvancecareDate] : 09/24

## 2024-09-10 ENCOUNTER — APPOINTMENT (OUTPATIENT)
Dept: NEUROLOGY | Facility: CLINIC | Age: 74
End: 2024-09-10
Payer: MEDICARE

## 2024-09-10 VITALS
HEART RATE: 70 BPM | HEIGHT: 65 IN | DIASTOLIC BLOOD PRESSURE: 65 MMHG | WEIGHT: 154 LBS | BODY MASS INDEX: 25.66 KG/M2 | SYSTOLIC BLOOD PRESSURE: 110 MMHG

## 2024-09-10 DIAGNOSIS — M48.061 SPINAL STENOSIS, LUMBAR REGION WITHOUT NEUROGENIC CLAUDICATION: ICD-10-CM

## 2024-09-10 DIAGNOSIS — M21.379 FOOT DROP, UNSPECIFIED FOOT: ICD-10-CM

## 2024-09-10 DIAGNOSIS — R41.89 OTHER SYMPTOMS AND SIGNS INVOLVING COGNITIVE FUNCTIONS AND AWARENESS: ICD-10-CM

## 2024-09-10 DIAGNOSIS — G91.2 (IDIOPATHIC) NORMAL PRESSURE HYDROCEPHALUS: ICD-10-CM

## 2024-09-10 DIAGNOSIS — R27.0 ATAXIA, UNSPECIFIED: ICD-10-CM

## 2024-09-10 DIAGNOSIS — E11.42 TYPE 2 DIABETES MELLITUS WITH DIABETIC POLYNEUROPATHY: ICD-10-CM

## 2024-09-10 PROCEDURE — 99205 OFFICE O/P NEW HI 60 MIN: CPT

## 2024-09-10 PROCEDURE — G2211 COMPLEX E/M VISIT ADD ON: CPT

## 2024-09-10 NOTE — ASSESSMENT
[FreeTextEntry1] : Impression: This 74-year-old male patient with diabetes/neuropathy hypertension hyperlipidemia depression CAD status post CABG status post cervical laminectomy for stenosis and myelopathy lumbar spinal stenosis is seen to rule out normal pressure hydrocephalus.  This patient has chronic history for at least the last 1 year of cognitive decline gait ataxia and also he has had urinary nighttime incontinence.  His presentation is quite complicated.  There are multiple possibilities regarding his cognitive decline including vascular disease in addition to NPH.  There are multiple possibilities regarding his gait disorder including history of cervical myelopathy diabetic peripheral neuropathy lumbar stenosis and rule out cerebrovascular disease as well as NPH.  The mild left foot drop is also of unclear etiology.  Differential diagnosis includes L5 radiculopathy peroneal neuropathy/CVA.  Recommendations: Prior to considering him for a  shunt for NPH I did suggest repeating MRI of the brain and obtaining MRA studies of the brain and neck.  Obtain copies of the most recent brain MRI performed elsewhere.  Obtain copies of most recent EMG/NCV consider repeat.  Consider neuropsychological testing to better evaluate his cognitive symptomatology.  Consider 3-day lumbar drain prior to shunt.

## 2024-09-10 NOTE — PHYSICAL EXAM
[FreeTextEntry1] : Head:  Normocephalic Neck: Mild restriction.  Spine: Mild restriction negative straight leg raising.  Mental Status:  Alert Oriented X3 Speech normal and no aphasia or dysarthria.  Cranial Nerves:  PERRL, fundi not visualized.  Visual Fields full  EOMI no diplopia no ptosis no nystagmus.  Chronic effects of right Bell's palsy.  Tongue protrudes in the midline up elevates normally.  Motor: Prominent bilateral postural and intention tremor.  Mild left upper extremity drift.  Mild weakness of left foot dorsiflexion and extensor hallucis.  DTRs: Symmetric 1-2+ with downgoing plantars.  Sensory: Reduced vibration distally in the lowers.  Gait: Short step widened based ataxia mild left foot drop though he is able to walk on his heels and toes bilaterally.  Unable to tandem walk.  Negative Romberg.

## 2024-09-10 NOTE — HISTORY OF PRESENT ILLNESS
[FreeTextEntry1] : This patient is seen for an office consultation.  He is a 74-year-old male patient is accompanied by his wife and son.  He has a complicated history.  He had a cervical laminectomy the performed from C3-4 through C6-7 for myelopathy in October 2022.  He had a previous MRI on 6/7/2022 revealing myelomalacia at the C5-6 level and areas of spinal stenosis and spondylosis.  He also has a history of lumbar spinal stenosis and there is an MRI from 11/14/2023 revealing degenerative changes and varying degrees of spinal stenosis and neuroforaminal narrowing with a superimposed congenital stenosis.  In addition this patient has diabetes and he has a diagnosis of peripheral neuropathy.  He has cognitive decline chronically for at least the last year with issues affecting short-term memory etc.  He has had nighttime urinary incontinence.    He has a chronic gait disorder which was not present at the time of the October 2022 cervical laminectomy according to his family and has been progressive during approximately the last year.  He has had neurological evaluation elsewhere including EMG/NCV which did diagnose diabetic peripheral neuropathy.  In the last several months he has been known to have a left foot drop which is painless.  MRI of the brain did suggest NPH and a high-volume lumbar spinal tap revealed equivocal findings.  He did not have the 3-day lumbar drain procedure.  He is seeing neurosurgery and is being considered for a ventriculoperitoneal shunt.  He has a history of CAD coronary bypass diabetes hypertension hyperlipidemia depression remote right Bell's palsy.  Medications reviewed.  I do not have copies of brain MRI EMG/NCV or lumbar puncture results.

## 2024-09-16 ENCOUNTER — APPOINTMENT (OUTPATIENT)
Dept: MRI IMAGING | Facility: CLINIC | Age: 74
End: 2024-09-16
Payer: MEDICARE

## 2024-09-16 ENCOUNTER — OUTPATIENT (OUTPATIENT)
Dept: OUTPATIENT SERVICES | Facility: HOSPITAL | Age: 74
LOS: 1 days | End: 2024-09-16
Payer: MEDICARE

## 2024-09-16 DIAGNOSIS — E89.0 POSTPROCEDURAL HYPOTHYROIDISM: Chronic | ICD-10-CM

## 2024-09-16 DIAGNOSIS — Z98.890 OTHER SPECIFIED POSTPROCEDURAL STATES: Chronic | ICD-10-CM

## 2024-09-16 DIAGNOSIS — M21.379 FOOT DROP, UNSPECIFIED FOOT: ICD-10-CM

## 2024-09-16 DIAGNOSIS — Z98.61 CORONARY ANGIOPLASTY STATUS: Chronic | ICD-10-CM

## 2024-09-16 DIAGNOSIS — Z98.84 BARIATRIC SURGERY STATUS: Chronic | ICD-10-CM

## 2024-09-16 PROCEDURE — 70551 MRI BRAIN STEM W/O DYE: CPT | Mod: 26,MH

## 2024-09-16 PROCEDURE — 70551 MRI BRAIN STEM W/O DYE: CPT

## 2024-09-18 ENCOUNTER — NON-APPOINTMENT (OUTPATIENT)
Age: 74
End: 2024-09-18

## 2024-09-20 ENCOUNTER — APPOINTMENT (OUTPATIENT)
Dept: MRI IMAGING | Facility: CLINIC | Age: 74
End: 2024-09-20
Payer: MEDICARE

## 2024-09-20 ENCOUNTER — OUTPATIENT (OUTPATIENT)
Dept: OUTPATIENT SERVICES | Facility: HOSPITAL | Age: 74
LOS: 1 days | End: 2024-09-20
Payer: MEDICARE

## 2024-09-20 DIAGNOSIS — Z98.890 OTHER SPECIFIED POSTPROCEDURAL STATES: Chronic | ICD-10-CM

## 2024-09-20 DIAGNOSIS — M21.379 FOOT DROP, UNSPECIFIED FOOT: ICD-10-CM

## 2024-09-20 DIAGNOSIS — Z98.61 CORONARY ANGIOPLASTY STATUS: Chronic | ICD-10-CM

## 2024-09-20 DIAGNOSIS — E89.0 POSTPROCEDURAL HYPOTHYROIDISM: Chronic | ICD-10-CM

## 2024-09-20 PROCEDURE — 70544 MR ANGIOGRAPHY HEAD W/O DYE: CPT | Mod: 26,MH

## 2024-09-20 PROCEDURE — 70544 MR ANGIOGRAPHY HEAD W/O DYE: CPT

## 2024-09-20 PROCEDURE — 70547 MR ANGIOGRAPHY NECK W/O DYE: CPT | Mod: 26,MH

## 2024-09-20 PROCEDURE — 70547 MR ANGIOGRAPHY NECK W/O DYE: CPT

## 2024-10-02 NOTE — DIETITIAN INITIAL EVALUATION ADULT - OBTAIN CURRENT WEIGHT
Detail Level: Zone Initiate Treatment: Injected candida into wart 0.2cc\\n\\nWartPeel in Remedium (17% salicylic acid (bulk), 2% fluorouracil (bulk))\\n-Apply to wart qhs , cover with bandaid and rinse off the next day.\\nStart in 5-7 days yes

## 2024-10-07 ENCOUNTER — APPOINTMENT (OUTPATIENT)
Dept: NEUROLOGY | Facility: CLINIC | Age: 74
End: 2024-10-07
Payer: MEDICARE

## 2024-10-07 VITALS
HEART RATE: 57 BPM | TEMPERATURE: 97.7 F | SYSTOLIC BLOOD PRESSURE: 166 MMHG | WEIGHT: 154 LBS | DIASTOLIC BLOOD PRESSURE: 77 MMHG | HEIGHT: 65 IN | OXYGEN SATURATION: 94 % | BODY MASS INDEX: 25.66 KG/M2

## 2024-10-07 VITALS
OXYGEN SATURATION: 94 % | BODY MASS INDEX: 25.66 KG/M2 | HEIGHT: 65 IN | TEMPERATURE: 97.7 F | WEIGHT: 154 LBS | DIASTOLIC BLOOD PRESSURE: 77 MMHG | SYSTOLIC BLOOD PRESSURE: 168 MMHG | HEART RATE: 57 BPM

## 2024-10-07 DIAGNOSIS — R41.89 OTHER SYMPTOMS AND SIGNS INVOLVING COGNITIVE FUNCTIONS AND AWARENESS: ICD-10-CM

## 2024-10-07 DIAGNOSIS — E11.42 TYPE 2 DIABETES MELLITUS WITH DIABETIC POLYNEUROPATHY: ICD-10-CM

## 2024-10-07 DIAGNOSIS — R27.0 ATAXIA, UNSPECIFIED: ICD-10-CM

## 2024-10-07 PROCEDURE — 99215 OFFICE O/P EST HI 40 MIN: CPT

## 2024-10-07 PROCEDURE — G2211 COMPLEX E/M VISIT ADD ON: CPT

## 2024-10-09 ENCOUNTER — APPOINTMENT (OUTPATIENT)
Dept: GERIATRICS | Facility: CLINIC | Age: 74
End: 2024-10-09

## 2024-10-09 DIAGNOSIS — M54.42 LUMBAGO WITH SCIATICA, LEFT SIDE: ICD-10-CM

## 2024-10-09 DIAGNOSIS — F32.A DEPRESSION, UNSPECIFIED: ICD-10-CM

## 2024-10-09 DIAGNOSIS — G89.29 LUMBAGO WITH SCIATICA, LEFT SIDE: ICD-10-CM

## 2024-10-09 DIAGNOSIS — M54.41 LUMBAGO WITH SCIATICA, LEFT SIDE: ICD-10-CM

## 2024-10-09 DIAGNOSIS — G91.2 (IDIOPATHIC) NORMAL PRESSURE HYDROCEPHALUS: ICD-10-CM

## 2024-10-09 DIAGNOSIS — M21.379 FOOT DROP, UNSPECIFIED FOOT: ICD-10-CM

## 2024-10-09 PROCEDURE — 99214 OFFICE O/P EST MOD 30 MIN: CPT

## 2024-10-09 RX ORDER — DULOXETINE HYDROCHLORIDE 30 MG/1
30 CAPSULE, DELAYED RELEASE PELLETS ORAL DAILY
Qty: 60 | Refills: 1 | Status: ACTIVE | COMMUNITY
Start: 2024-10-09 | End: 1900-01-01

## 2024-10-10 ENCOUNTER — APPOINTMENT (OUTPATIENT)
Dept: NEUROSURGERY | Facility: CLINIC | Age: 74
End: 2024-10-10

## 2024-10-16 ENCOUNTER — APPOINTMENT (OUTPATIENT)
Dept: ENDOCRINOLOGY | Facility: CLINIC | Age: 74
End: 2024-10-16

## 2024-10-30 ENCOUNTER — APPOINTMENT (OUTPATIENT)
Dept: SPINE | Facility: CLINIC | Age: 74
End: 2024-10-30
Payer: MEDICARE

## 2024-10-30 VITALS
RESPIRATION RATE: 16 BRPM | BODY MASS INDEX: 23.99 KG/M2 | HEIGHT: 65 IN | SYSTOLIC BLOOD PRESSURE: 133 MMHG | HEART RATE: 62 BPM | DIASTOLIC BLOOD PRESSURE: 72 MMHG | WEIGHT: 144 LBS | OXYGEN SATURATION: 98 %

## 2024-10-30 DIAGNOSIS — G91.2 (IDIOPATHIC) NORMAL PRESSURE HYDROCEPHALUS: ICD-10-CM

## 2024-10-30 PROCEDURE — 99214 OFFICE O/P EST MOD 30 MIN: CPT

## 2024-11-05 ENCOUNTER — NON-APPOINTMENT (OUTPATIENT)
Age: 74
End: 2024-11-05

## 2024-11-05 LAB
ANION GAP SERPL CALC-SCNC: 15 MMOL/L
BUN SERPL-MCNC: 16 MG/DL
CALCIUM SERPL-MCNC: 9.9 MG/DL
CHLORIDE SERPL-SCNC: 101 MMOL/L
CO2 SERPL-SCNC: 26 MMOL/L
CREAT SERPL-MCNC: 0.83 MG/DL
EGFR: 92 ML/MIN/1.73M2
GLUCOSE SERPL-MCNC: 106 MG/DL
HCT VFR BLD CALC: 46 %
HGB BLD-MCNC: 14.1 G/DL
INR PPP: 0.96 RATIO
MCHC RBC-ENTMCNC: 25.8 PG
MCHC RBC-ENTMCNC: 30.7 G/DL
MCV RBC AUTO: 84.2 FL
PLATELET # BLD AUTO: 166 K/UL
POTASSIUM SERPL-SCNC: 4 MMOL/L
PT BLD: 11.3 SEC
RBC # BLD: 5.46 M/UL
RBC # FLD: 18.1 %
SODIUM SERPL-SCNC: 142 MMOL/L
WBC # FLD AUTO: 10.48 K/UL

## 2024-11-11 ENCOUNTER — APPOINTMENT (OUTPATIENT)
Dept: RADIOLOGY | Facility: HOSPITAL | Age: 74
End: 2024-11-11
Payer: MEDICARE

## 2024-11-11 ENCOUNTER — INPATIENT (INPATIENT)
Facility: HOSPITAL | Age: 74
LOS: 2 days | Discharge: HOME | DRG: 57 | End: 2024-11-14
Attending: NEUROLOGICAL SURGERY | Admitting: NEUROLOGICAL SURGERY
Payer: MEDICARE

## 2024-11-11 VITALS — OXYGEN SATURATION: 97 % | HEART RATE: 51 BPM | SYSTOLIC BLOOD PRESSURE: 144 MMHG | DIASTOLIC BLOOD PRESSURE: 76 MMHG

## 2024-11-11 DIAGNOSIS — E89.0 POSTPROCEDURAL HYPOTHYROIDISM: Chronic | ICD-10-CM

## 2024-11-11 DIAGNOSIS — Z98.890 OTHER SPECIFIED POSTPROCEDURAL STATES: Chronic | ICD-10-CM

## 2024-11-11 DIAGNOSIS — Z98.84 BARIATRIC SURGERY STATUS: Chronic | ICD-10-CM

## 2024-11-11 DIAGNOSIS — G91.2 (IDIOPATHIC) NORMAL PRESSURE HYDROCEPHALUS: ICD-10-CM

## 2024-11-11 DIAGNOSIS — Z90.49 ACQUIRED ABSENCE OF OTHER SPECIFIED PARTS OF DIGESTIVE TRACT: Chronic | ICD-10-CM

## 2024-11-11 DIAGNOSIS — Z95.1 PRESENCE OF AORTOCORONARY BYPASS GRAFT: Chronic | ICD-10-CM

## 2024-11-11 DIAGNOSIS — Z98.61 CORONARY ANGIOPLASTY STATUS: Chronic | ICD-10-CM

## 2024-11-11 LAB
GLUCOSE BLDC GLUCOMTR-MCNC: 116 MG/DL — HIGH (ref 70–99)
GLUCOSE BLDC GLUCOMTR-MCNC: 89 MG/DL — SIGNIFICANT CHANGE UP (ref 70–99)
GLUCOSE BLDC GLUCOMTR-MCNC: 92 MG/DL — SIGNIFICANT CHANGE UP (ref 70–99)

## 2024-11-11 PROCEDURE — 62329 THER SPI PNXR CSF FLUOR/CT: CPT

## 2024-11-11 RX ORDER — TRAZODONE HYDROCHLORIDE 100 MG/1
100 TABLET ORAL DAILY
Refills: 0 | Status: DISCONTINUED | OUTPATIENT
Start: 2024-11-11 | End: 2024-11-12

## 2024-11-11 RX ORDER — ACETAMINOPHEN 500 MG
1000 TABLET ORAL EVERY 6 HOURS
Refills: 0 | Status: DISCONTINUED | OUTPATIENT
Start: 2024-11-11 | End: 2024-11-13

## 2024-11-11 RX ORDER — FAMOTIDINE 10 MG/ML
20 INJECTION INTRAVENOUS EVERY 24 HOURS
Refills: 0 | Status: DISCONTINUED | OUTPATIENT
Start: 2024-11-11 | End: 2024-11-14

## 2024-11-11 RX ORDER — SENNA 187 MG
2 TABLET ORAL AT BEDTIME
Refills: 0 | Status: DISCONTINUED | OUTPATIENT
Start: 2024-11-11 | End: 2024-11-14

## 2024-11-11 RX ORDER — GLUCAGON INJECTION, SOLUTION 1 MG/.2ML
1 INJECTION, SOLUTION SUBCUTANEOUS ONCE
Refills: 0 | Status: DISCONTINUED | OUTPATIENT
Start: 2024-11-11 | End: 2024-11-14

## 2024-11-11 RX ORDER — PROPRANOLOL HCL 60 MG
160 TABLET ORAL DAILY
Refills: 0 | Status: DISCONTINUED | OUTPATIENT
Start: 2024-11-11 | End: 2024-11-11

## 2024-11-11 RX ORDER — TAMSULOSIN HCL 0.4 MG
0.4 CAPSULE ORAL AT BEDTIME
Refills: 0 | Status: DISCONTINUED | OUTPATIENT
Start: 2024-11-11 | End: 2024-11-14

## 2024-11-11 RX ORDER — OXYCODONE HYDROCHLORIDE 30 MG/1
5 TABLET ORAL EVERY 4 HOURS
Refills: 0 | Status: DISCONTINUED | OUTPATIENT
Start: 2024-11-11 | End: 2024-11-14

## 2024-11-11 RX ORDER — POLYETHYLENE GLYCOL 3350 17 G/17G
17 POWDER, FOR SOLUTION ORAL DAILY
Refills: 0 | Status: DISCONTINUED | OUTPATIENT
Start: 2024-11-11 | End: 2024-11-12

## 2024-11-11 RX ORDER — LEVOTHYROXINE SODIUM 88 MCG
137 TABLET ORAL DAILY
Refills: 0 | Status: DISCONTINUED | OUTPATIENT
Start: 2024-11-11 | End: 2024-11-14

## 2024-11-11 RX ORDER — TRAZODONE HYDROCHLORIDE 100 MG/1
50 TABLET ORAL DAILY
Refills: 0 | Status: DISCONTINUED | OUTPATIENT
Start: 2024-11-11 | End: 2024-11-11

## 2024-11-11 RX ORDER — SERTRALINE HYDROCHLORIDE 50 MG/1
100 TABLET, FILM COATED ORAL DAILY
Refills: 0 | Status: DISCONTINUED | OUTPATIENT
Start: 2024-11-11 | End: 2024-11-14

## 2024-11-11 RX ORDER — PROPRANOLOL HCL 60 MG
120 TABLET ORAL DAILY
Refills: 0 | Status: DISCONTINUED | OUTPATIENT
Start: 2024-11-11 | End: 2024-11-14

## 2024-11-11 RX ORDER — ROSUVASTATIN CALCIUM 10 MG
20 TABLET ORAL AT BEDTIME
Refills: 0 | Status: DISCONTINUED | OUTPATIENT
Start: 2024-11-11 | End: 2024-11-14

## 2024-11-11 RX ORDER — OXYCODONE HYDROCHLORIDE 30 MG/1
10 TABLET ORAL EVERY 4 HOURS
Refills: 0 | Status: DISCONTINUED | OUTPATIENT
Start: 2024-11-11 | End: 2024-11-14

## 2024-11-11 RX ORDER — FINASTERIDE 5 MG/1
5 TABLET, FILM COATED ORAL DAILY
Refills: 0 | Status: DISCONTINUED | OUTPATIENT
Start: 2024-11-11 | End: 2024-11-14

## 2024-11-11 RX ORDER — LOSARTAN POTASSIUM 25 MG/1
100 TABLET ORAL DAILY
Refills: 0 | Status: DISCONTINUED | OUTPATIENT
Start: 2024-11-11 | End: 2024-11-14

## 2024-11-11 RX ORDER — INSULIN LISPRO 100/ML
VIAL (ML) SUBCUTANEOUS
Refills: 0 | Status: DISCONTINUED | OUTPATIENT
Start: 2024-11-11 | End: 2024-11-14

## 2024-11-11 RX ORDER — INSULIN LISPRO 100/ML
VIAL (ML) SUBCUTANEOUS AT BEDTIME
Refills: 0 | Status: DISCONTINUED | OUTPATIENT
Start: 2024-11-11 | End: 2024-11-14

## 2024-11-11 RX ADMIN — FINASTERIDE 5 MILLIGRAM(S): 5 TABLET, FILM COATED ORAL at 13:18

## 2024-11-11 RX ADMIN — OXYCODONE HYDROCHLORIDE 10 MILLIGRAM(S): 30 TABLET ORAL at 18:27

## 2024-11-11 RX ADMIN — Medication 20 MILLIGRAM(S): at 22:17

## 2024-11-11 RX ADMIN — TRAZODONE HYDROCHLORIDE 100 MILLIGRAM(S): 100 TABLET ORAL at 13:18

## 2024-11-11 RX ADMIN — Medication 0.4 MILLIGRAM(S): at 22:17

## 2024-11-11 RX ADMIN — SERTRALINE HYDROCHLORIDE 100 MILLIGRAM(S): 50 TABLET, FILM COATED ORAL at 13:18

## 2024-11-11 NOTE — OCCUPATIONAL THERAPY INITIAL EVALUATION ADULT - COGNITIVE, VISUAL PERCEPTUAL, OT EVAL
GOAL: pt will follow 4 step commands independently while engaging in ADL tasks to increase independence and safety

## 2024-11-11 NOTE — H&P ADULT - HISTORY OF PRESENT ILLNESS
74M PMH Cervical D/F C3-C7 for myelopathy 10/2022, lumbar degen, CAD, NSTEMI, CABG, DM (no peripheral neuropathy), HTN, HLD, Depression,  remote right Bell's palsy, BPH, adm 11/11 NPH trial, cognitive decline x1 yr, nighttime urinary incontinence, gait instability (not there 2022).    Exam: L foot drop baseline

## 2024-11-11 NOTE — PATIENT PROFILE ADULT - FALL HARM RISK - HARM RISK INTERVENTIONS

## 2024-11-11 NOTE — PHYSICAL THERAPY INITIAL EVALUATION ADULT - PERTINENT HX OF CURRENT PROBLEM, REHAB EVAL
Pt is a 74M PMH Cervical D/F C3-C7 for myelopathy 10/2022, lumbar degeneration, CAD, NSTEMI, CABG, DM (no peripheral neuropathy), HTN, HLD, Depression,  remote right Bell's palsy, BPH, adm 11/11 NPH trial, cognitive decline x1 yr, night time urinary incontinence, gait instability (not there 2022).

## 2024-11-11 NOTE — PHYSICAL THERAPY INITIAL EVALUATION ADULT - TIMED UP AND GO TEST, REHAB EVAL
Pt performed the TUG (10 ft) x3 without AD and Sup. Pt performed 1st trial for 27.25 seconds with 4 steps for turning, 2nd trial for 24.95 seconds with 4 steps for turning and 3rd trial for 26.12 seconds with 5 steps for turning. Pt demonstrated dec balance, dec trunk rotation, dec nikki, dec step length, wide REGIS, occasionally leaning on wall to regain balance, dec arm swing, dec knee flexion and poor body positioning for stand to sit transfers.

## 2024-11-11 NOTE — OCCUPATIONAL THERAPY INITIAL EVALUATION ADULT - DIAGNOSIS, OT EVAL
pt presents with decreased strength, cognition, and balance limiting their ability to engage in ADLs and functional tasks.

## 2024-11-11 NOTE — PRE-ANESTHESIA EVALUATION ADULT - NSANTHADDINFOFT_GEN_ALL_CORE
The patient was seen and evaluated in prior to entering the procedure room.   The anesthetic plan, including risks and benefits, were discussed with the patient and all questions answered.

## 2024-11-11 NOTE — PHYSICAL THERAPY INITIAL EVALUATION ADULT - ADDITIONAL COMMENTS
Pt lives with his wife in a house with 1 steps to enter & 1 step inside and 1 flight of stairs, +HR. Pt occasionally uses a RW for ambulation. Pt's wife available to assist when needed. Pt is R hand dominant. +drives occasionally.

## 2024-11-11 NOTE — PRE-ANESTHESIA EVALUATION ADULT - NSANTHPMHFT_GEN_ALL_CORE
74M PMH Cervical D/F C3-C7 for myelopathy 10/2022, lumbar degen, CAD, NSTEMI, CABG, DM (no peripheral neuropathy), HTN, HLD, Depression,  remote right Bell's palsy, BPH, adm 11/11 NPH trial, cognitive decline x1 yr, nighttime urinary incontinence, gait instability.  Last dose jardiance 1 week ago.

## 2024-11-11 NOTE — PATIENT PROFILE ADULT - PATIENT'S PREFERRED PRONOUN
"I contacted Aarti Short Washington's  at [facility] (where she lives) because there was a question as to whether a CPS case would be reopened when the parents failed a recent rooming-in period to demonstrate competency in caring for Washington.     She wrote that:  \"Her parents continue to have custody and make all decisions. We have been working with [facility] to help prepare for a future discharge, however; there is not an official \"case\" open against Washington's parents. The  has been very blunt with Washington's parents, though, and have told them that if they don't get it together, an official case may be opened. The 's contact info is as follows:     Mimi Brannon  [facility]  [phone]    Discharge would be back to our facility, unless the parents decide otherwise.  I don't think they will as they have signed the Bed Hold Agreement. If they would say they are taking her home we would need to get a hold of Mimi at [facility].\"       "
Him/He

## 2024-11-12 ENCOUNTER — APPOINTMENT (OUTPATIENT)
Dept: GERIATRICS | Facility: CLINIC | Age: 74
End: 2024-11-12

## 2024-11-12 LAB
A1C WITH ESTIMATED AVERAGE GLUCOSE RESULT: 6.1 % — HIGH (ref 4–5.6)
ANION GAP SERPL CALC-SCNC: 12 MMOL/L — SIGNIFICANT CHANGE UP (ref 5–17)
BUN SERPL-MCNC: 17 MG/DL — SIGNIFICANT CHANGE UP (ref 7–23)
CALCIUM SERPL-MCNC: 9.2 MG/DL — SIGNIFICANT CHANGE UP (ref 8.4–10.5)
CHLORIDE SERPL-SCNC: 97 MMOL/L — SIGNIFICANT CHANGE UP (ref 96–108)
CO2 SERPL-SCNC: 24 MMOL/L — SIGNIFICANT CHANGE UP (ref 22–31)
CREAT SERPL-MCNC: 0.87 MG/DL — SIGNIFICANT CHANGE UP (ref 0.5–1.3)
EGFR: 91 ML/MIN/1.73M2 — SIGNIFICANT CHANGE UP
ESTIMATED AVERAGE GLUCOSE: 128 MG/DL — HIGH (ref 68–114)
GLUCOSE BLDC GLUCOMTR-MCNC: 108 MG/DL — HIGH (ref 70–99)
GLUCOSE BLDC GLUCOMTR-MCNC: 108 MG/DL — HIGH (ref 70–99)
GLUCOSE BLDC GLUCOMTR-MCNC: 178 MG/DL — HIGH (ref 70–99)
GLUCOSE BLDC GLUCOMTR-MCNC: 92 MG/DL — SIGNIFICANT CHANGE UP (ref 70–99)
GLUCOSE SERPL-MCNC: 174 MG/DL — HIGH (ref 70–99)
HCT VFR BLD CALC: 39.5 % — SIGNIFICANT CHANGE UP (ref 39–50)
HGB BLD-MCNC: 12.4 G/DL — LOW (ref 13–17)
MCHC RBC-ENTMCNC: 25.6 PG — LOW (ref 27–34)
MCHC RBC-ENTMCNC: 31.4 G/DL — LOW (ref 32–36)
MCV RBC AUTO: 81.4 FL — SIGNIFICANT CHANGE UP (ref 80–100)
NRBC # BLD: 0 /100 WBCS — SIGNIFICANT CHANGE UP (ref 0–0)
PLATELET # BLD AUTO: 137 K/UL — LOW (ref 150–400)
POTASSIUM SERPL-MCNC: 3.7 MMOL/L — SIGNIFICANT CHANGE UP (ref 3.5–5.3)
POTASSIUM SERPL-SCNC: 3.7 MMOL/L — SIGNIFICANT CHANGE UP (ref 3.5–5.3)
RBC # BLD: 4.85 M/UL — SIGNIFICANT CHANGE UP (ref 4.2–5.8)
RBC # FLD: 16.4 % — HIGH (ref 10.3–14.5)
SODIUM SERPL-SCNC: 133 MMOL/L — LOW (ref 135–145)
WBC # BLD: 7.7 K/UL — SIGNIFICANT CHANGE UP (ref 3.8–10.5)
WBC # FLD AUTO: 7.7 K/UL — SIGNIFICANT CHANGE UP (ref 3.8–10.5)

## 2024-11-12 PROCEDURE — 70450 CT HEAD/BRAIN W/O DYE: CPT | Mod: 26

## 2024-11-12 PROCEDURE — 99233 SBSQ HOSP IP/OBS HIGH 50: CPT | Mod: FS

## 2024-11-12 RX ORDER — CALCIUM CARBONATE 400(1000)
1 TABLET,CHEWABLE ORAL
Refills: 0 | Status: DISCONTINUED | OUTPATIENT
Start: 2024-11-12 | End: 2024-11-14

## 2024-11-12 RX ORDER — MELATONIN 5 MG
5 TABLET ORAL AT BEDTIME
Refills: 0 | Status: DISCONTINUED | OUTPATIENT
Start: 2024-11-12 | End: 2024-11-14

## 2024-11-12 RX ORDER — TRAZODONE HYDROCHLORIDE 100 MG/1
100 TABLET ORAL AT BEDTIME
Refills: 0 | Status: DISCONTINUED | OUTPATIENT
Start: 2024-11-12 | End: 2024-11-14

## 2024-11-12 RX ORDER — POTASSIUM CHLORIDE 10 MEQ
40 TABLET, EXTENDED RELEASE ORAL ONCE
Refills: 0 | Status: COMPLETED | OUTPATIENT
Start: 2024-11-12 | End: 2024-11-12

## 2024-11-12 RX ORDER — ENOXAPARIN SODIUM 40MG/0.4ML
40 SYRINGE (ML) SUBCUTANEOUS
Refills: 0 | Status: DISCONTINUED | OUTPATIENT
Start: 2024-11-12 | End: 2024-11-13

## 2024-11-12 RX ORDER — POLYETHYLENE GLYCOL 3350 17 G/17G
17 POWDER, FOR SOLUTION ORAL
Refills: 0 | Status: DISCONTINUED | OUTPATIENT
Start: 2024-11-12 | End: 2024-11-14

## 2024-11-12 RX ADMIN — Medication 400 MILLIGRAM(S): at 10:20

## 2024-11-12 RX ADMIN — Medication 1000 MILLIGRAM(S): at 22:12

## 2024-11-12 RX ADMIN — OXYCODONE HYDROCHLORIDE 10 MILLIGRAM(S): 30 TABLET ORAL at 12:14

## 2024-11-12 RX ADMIN — Medication 40 MILLIEQUIVALENT(S): at 17:34

## 2024-11-12 RX ADMIN — OXYCODONE HYDROCHLORIDE 10 MILLIGRAM(S): 30 TABLET ORAL at 12:45

## 2024-11-12 RX ADMIN — TRAZODONE HYDROCHLORIDE 100 MILLIGRAM(S): 100 TABLET ORAL at 22:00

## 2024-11-12 RX ADMIN — OXYCODONE HYDROCHLORIDE 10 MILLIGRAM(S): 30 TABLET ORAL at 17:37

## 2024-11-12 RX ADMIN — FINASTERIDE 5 MILLIGRAM(S): 5 TABLET, FILM COATED ORAL at 12:10

## 2024-11-12 RX ADMIN — SERTRALINE HYDROCHLORIDE 100 MILLIGRAM(S): 50 TABLET, FILM COATED ORAL at 12:10

## 2024-11-12 RX ADMIN — Medication 400 MILLIGRAM(S): at 21:42

## 2024-11-12 RX ADMIN — FAMOTIDINE 20 MILLIGRAM(S): 10 INJECTION INTRAVENOUS at 21:41

## 2024-11-12 RX ADMIN — OXYCODONE HYDROCHLORIDE 10 MILLIGRAM(S): 30 TABLET ORAL at 00:45

## 2024-11-12 RX ADMIN — OXYCODONE HYDROCHLORIDE 10 MILLIGRAM(S): 30 TABLET ORAL at 00:15

## 2024-11-12 RX ADMIN — Medication 120 MILLIGRAM(S): at 05:42

## 2024-11-12 RX ADMIN — Medication 20 MILLIGRAM(S): at 21:41

## 2024-11-12 RX ADMIN — Medication 5 MILLIGRAM(S): at 21:41

## 2024-11-12 RX ADMIN — Medication 137 MICROGRAM(S): at 05:43

## 2024-11-12 RX ADMIN — Medication 5 MILLIGRAM(S): at 00:37

## 2024-11-12 RX ADMIN — Medication 1000 MILLIGRAM(S): at 12:00

## 2024-11-12 RX ADMIN — LOSARTAN POTASSIUM 100 MILLIGRAM(S): 25 TABLET ORAL at 05:43

## 2024-11-12 RX ADMIN — Medication 40 MILLIGRAM(S): at 17:34

## 2024-11-12 RX ADMIN — Medication 2: at 12:11

## 2024-11-12 RX ADMIN — Medication 0.4 MILLIGRAM(S): at 21:41

## 2024-11-12 NOTE — PROGRESS NOTE ADULT - ASSESSMENT
HPI:  74M PMH Cervical D/F C3-C7 for myelopathy 10/2022, lumbar degen, CAD, NSTEMI, CABG, DM (no peripheral neuropathy), HTN, HLD, Depression,  remote right Bell's palsy, BPH, adm 11/11 NPH trial, cognitive decline x1 yr, nighttime urinary incontinence, gait instability (not there 2022).    Exam: L foot drop baseline     (11 Nov 2024 12:00)    PROCEDURE:    11/11 s/p LD placement by neuroradiology    PLAN:  - neuro and vital check Q4hrs  - CTH pending for episode of ?overdrainage overnight, will hold LD drainage today  - depression, continue zoloft  - pain control with tylenol and oxycodone prn  - insomnia, continue home melatonin and Trazadone  - history of CAD s/p CABG, hold ASA given LD placement  - HTN, vitals reviewed stable, continue home propanolol, losartan, and HCTZ  - HLD, continue Crestor  - BPH, continue proscar and flomax  - continue synthyroid for hypothyroidism  - T2DM, continue ISS  - tolerating CCD diet  - senna and miralax for bowel regimens  - activity increase as tolerated  - incentive spirometer  - DVT ppx: b/l SCDs and will start DVT ppx once CTH result stable  Discharge planning: PT/OT daily eval for NPH trial    plan discussed with Dr. Trey lima [id number]

## 2024-11-12 NOTE — CHART NOTE - NSCHARTNOTEFT_GEN_A_CORE
CAPRINI SCORE [CLOT] Score on Admission for     AGE RELATED RISK FACTORS                                                       MOBILITY RELATED FACTORS  [ ] Age 41-60 years                                            (1 Point)                  [ ] Bed rest                                                        (1 Point)  [x ] Age: 61-74 years                                           (2 Points)                 [ ] Plaster cast                                                   (2 Points)  [ ] Age= 75 years                                              (3 Points)                 [ ] Bed bound for more than 72 hours                 (2 Points)    DISEASE RELATED RISK FACTORS                                               GENDER SPECIFIC FACTORS  [ ] Edema in the lower extremities                       (1 Point)                  [ ] Pregnancy                                                     (1 Point)  [ ] Varicose veins                                               (1 Point)                  [ ] Post-partum < 6 weeks                                   (1 Point)             [ ] BMI > 25 Kg/m2                                            (1 Point)                  [ ] Hormonal therapy  or oral contraception          (1 Point)                 [ ] Sepsis (in the previous month)                        (1 Point)                  [ ] History of pregnancy complications                 (1 point)  [ ] Pneumonia or serious lung disease                                               [ ] Unexplained or recurrent                     (1 Point)           (in the previous month)                               (1 Point)  [ ] Abnormal pulmonary function test                     (1 Point)                 SURGERY RELATED RISK FACTORS (include planned surgeries)  [ ] Acute myocardial infarction                              (1 Point)                 [ ]  Section                                             (1 Point)  [ ] Congestive heart failure (in the previous month)  (1 Point)         [ ] Minor surgery                                                  (1 Point)   [ ] Inflammatory bowel disease                             (1 Point)                 [ ] Arthroscopic surgery                                        (2 Points)  [ ] Central venous access                                      (2 Points)                [ ] General surgery lasting more than 45 minutes   (2 Points)       [ ] Stroke (in the previous month)                          (5 Points)               [ ] Elective arthroplasty                                         (5 Points)            [ ] current or past malignancy                              (2 Points)                                                                                                       HEMATOLOGY RELATED FACTORS                                                 TRAUMA RELATED RISK FACTORS  [ ] Prior episodes of VTE                                     (3 Points)                [ ] Fracture of the hip, pelvis, or leg                       (5 Points)  [ ] Positive family history for VTE                         (3 Points)                 [ ] Acute spinal cord injury (in the previous month)  (5 Points)  [ ] Prothrombin 74574 A                                     (3 Points)                 [ ] Paralysis  (less than 1 month)                             (5 Points)  [ ] Factor V Leiden                                             (3 Points)                  [ ] Multiple Trauma within 1 month                        (5 Points)  [ ] Lupus anticoagulants                                     (3 Points)                                                           [ ] Anticardiolipin antibodies                               (3 Points)                                                       [ ] High homocysteine in the blood                      (3 Points)                                             [ ] Other congenital or acquired thrombophilia      (3 Points)                                                [ ] Heparin induced thrombocytopenia                  (3 Points)                                          Total Score [     2     ]    Risk:  Very low 0   Low 1 to 2   Moderate 3 to 4   High =5       VTE Prophylasix Recommednations:  [x ] mechanical pneumatic compression devices                                      [ ] contraindicated: _____________________  [ x] chemo prophylasix                                                                                   [ ] contraindicated _____________________    **** HIGH LIKELIHOOD DVT PRESENT ON ADMISSION  [ ] (please order LE dopplers within 24 hours of admission)

## 2024-11-12 NOTE — PROVIDER CONTACT NOTE (OTHER) - ASSESSMENT
Patient is neurologically stable and unchanged. VSS besides increased BP (pre drain: 135/68, post drain 151/74), but patient sat up before vitals were taken. Patient complains of no pain or discomfort.

## 2024-11-12 NOTE — PROVIDER CONTACT NOTE (OTHER) - SITUATION
Lumbar drain accidentally left open to bag, total output unknown. Total drain time about 5 minutes. Last check of output was 13ml and about 30 seconds later it was 0ml. Drain immediately stopped.

## 2024-11-12 NOTE — PROGRESS NOTE ADULT - ASSESSMENT
Refills sent to alternate pharmacy in [place]   72M with PMHx of MI s/p stents (1983), CAD s/p multiple balloon angioplasties up to a few years ago, DM II,  HLD, HTN, thyroid cancer s/p thyroidectomy, cholecystitis s/p sepsis requiring cholecystectomy, PUD s/p gastric bypass, spinal stenosis, depression presented to the [place] ED 4/8/22 c/o of chest pain, nausea, and regurgitation. Pt underwent cardiac catheterization at [place] showing multivessel disease and was transferred to [facility].  Pt underwent ABG c3, LAD endarterectomy with svg patch.  Pt extubated, off pressors.  NAD.   tolerating PO lorpessor, brief episode of afib post op (10 seconds) - started on amio

## 2024-11-13 DIAGNOSIS — G91.2 (IDIOPATHIC) NORMAL PRESSURE HYDROCEPHALUS: ICD-10-CM

## 2024-11-13 DIAGNOSIS — E11.9 TYPE 2 DIABETES MELLITUS WITHOUT COMPLICATIONS: ICD-10-CM

## 2024-11-13 DIAGNOSIS — I25.10 ATHEROSCLEROTIC HEART DISEASE OF NATIVE CORONARY ARTERY WITHOUT ANGINA PECTORIS: ICD-10-CM

## 2024-11-13 LAB
ANION GAP SERPL CALC-SCNC: 12 MMOL/L — SIGNIFICANT CHANGE UP (ref 5–17)
BUN SERPL-MCNC: 20 MG/DL — SIGNIFICANT CHANGE UP (ref 7–23)
CALCIUM SERPL-MCNC: 9.8 MG/DL — SIGNIFICANT CHANGE UP (ref 8.4–10.5)
CHLORIDE SERPL-SCNC: 100 MMOL/L — SIGNIFICANT CHANGE UP (ref 96–108)
CO2 SERPL-SCNC: 26 MMOL/L — SIGNIFICANT CHANGE UP (ref 22–31)
CREAT SERPL-MCNC: 0.77 MG/DL — SIGNIFICANT CHANGE UP (ref 0.5–1.3)
EGFR: 94 ML/MIN/1.73M2 — SIGNIFICANT CHANGE UP
GLUCOSE BLDC GLUCOMTR-MCNC: 150 MG/DL — HIGH (ref 70–99)
GLUCOSE BLDC GLUCOMTR-MCNC: 156 MG/DL — HIGH (ref 70–99)
GLUCOSE BLDC GLUCOMTR-MCNC: 167 MG/DL — HIGH (ref 70–99)
GLUCOSE BLDC GLUCOMTR-MCNC: 93 MG/DL — SIGNIFICANT CHANGE UP (ref 70–99)
GLUCOSE SERPL-MCNC: 92 MG/DL — SIGNIFICANT CHANGE UP (ref 70–99)
HCT VFR BLD CALC: 36.8 % — LOW (ref 39–50)
HGB BLD-MCNC: 11.7 G/DL — LOW (ref 13–17)
MCHC RBC-ENTMCNC: 25.3 PG — LOW (ref 27–34)
MCHC RBC-ENTMCNC: 31.8 G/DL — LOW (ref 32–36)
MCV RBC AUTO: 79.7 FL — LOW (ref 80–100)
NRBC # BLD: 0 /100 WBCS — SIGNIFICANT CHANGE UP (ref 0–0)
PLATELET # BLD AUTO: 124 K/UL — LOW (ref 150–400)
POTASSIUM SERPL-MCNC: 3.3 MMOL/L — LOW (ref 3.5–5.3)
POTASSIUM SERPL-SCNC: 3.3 MMOL/L — LOW (ref 3.5–5.3)
RBC # BLD: 4.62 M/UL — SIGNIFICANT CHANGE UP (ref 4.2–5.8)
RBC # FLD: 16.5 % — HIGH (ref 10.3–14.5)
SODIUM SERPL-SCNC: 138 MMOL/L — SIGNIFICANT CHANGE UP (ref 135–145)
WBC # BLD: 7.45 K/UL — SIGNIFICANT CHANGE UP (ref 3.8–10.5)
WBC # FLD AUTO: 7.45 K/UL — SIGNIFICANT CHANGE UP (ref 3.8–10.5)

## 2024-11-13 PROCEDURE — 99232 SBSQ HOSP IP/OBS MODERATE 35: CPT | Mod: FS

## 2024-11-13 RX ORDER — POTASSIUM CHLORIDE 10 MEQ
20 TABLET, EXTENDED RELEASE ORAL
Refills: 0 | Status: COMPLETED | OUTPATIENT
Start: 2024-11-13 | End: 2024-11-13

## 2024-11-13 RX ORDER — ACETAMINOPHEN 500 MG
650 TABLET ORAL EVERY 6 HOURS
Refills: 0 | Status: DISCONTINUED | OUTPATIENT
Start: 2024-11-13 | End: 2024-11-14

## 2024-11-13 RX ORDER — ACETAMINOPHEN 500 MG
1000 TABLET ORAL ONCE
Refills: 0 | Status: COMPLETED | OUTPATIENT
Start: 2024-11-13 | End: 2024-11-13

## 2024-11-13 RX ADMIN — Medication 1000 MILLIGRAM(S): at 09:14

## 2024-11-13 RX ADMIN — Medication 20 MILLIEQUIVALENT(S): at 14:39

## 2024-11-13 RX ADMIN — OXYCODONE HYDROCHLORIDE 10 MILLIGRAM(S): 30 TABLET ORAL at 12:25

## 2024-11-13 RX ADMIN — Medication 20 MILLIEQUIVALENT(S): at 10:23

## 2024-11-13 RX ADMIN — SERTRALINE HYDROCHLORIDE 100 MILLIGRAM(S): 50 TABLET, FILM COATED ORAL at 12:04

## 2024-11-13 RX ADMIN — Medication 1000 MILLIGRAM(S): at 16:50

## 2024-11-13 RX ADMIN — Medication 20 MILLIEQUIVALENT(S): at 12:04

## 2024-11-13 RX ADMIN — Medication 120 MILLIGRAM(S): at 05:46

## 2024-11-13 RX ADMIN — Medication 2: at 11:36

## 2024-11-13 RX ADMIN — TRAZODONE HYDROCHLORIDE 100 MILLIGRAM(S): 100 TABLET ORAL at 21:21

## 2024-11-13 RX ADMIN — FAMOTIDINE 20 MILLIGRAM(S): 10 INJECTION INTRAVENOUS at 21:21

## 2024-11-13 RX ADMIN — Medication 5 MILLIGRAM(S): at 21:22

## 2024-11-13 RX ADMIN — Medication 0.4 MILLIGRAM(S): at 21:22

## 2024-11-13 RX ADMIN — Medication 2: at 16:42

## 2024-11-13 RX ADMIN — Medication 137 MICROGRAM(S): at 05:45

## 2024-11-13 RX ADMIN — Medication 20 MILLIGRAM(S): at 21:21

## 2024-11-13 RX ADMIN — LOSARTAN POTASSIUM 100 MILLIGRAM(S): 25 TABLET ORAL at 05:45

## 2024-11-13 RX ADMIN — FINASTERIDE 5 MILLIGRAM(S): 5 TABLET, FILM COATED ORAL at 12:04

## 2024-11-13 RX ADMIN — Medication 400 MILLIGRAM(S): at 16:20

## 2024-11-13 RX ADMIN — Medication 400 MILLIGRAM(S): at 08:44

## 2024-11-13 RX ADMIN — OXYCODONE HYDROCHLORIDE 10 MILLIGRAM(S): 30 TABLET ORAL at 12:55

## 2024-11-13 NOTE — PROGRESS NOTE ADULT - ASSESSMENT
HPI:  74M PMH Cervical D/F C3-C7 for myelopathy 10/2022, lumbar degen, CAD, NSTEMI, CABG, DM (no peripheral neuropathy), HTN, HLD, Depression,  remote right Bell's palsy, BPH, adm 11/11 NPH trial, cognitive decline x1 yr, nighttime urinary incontinence, gait instability (not there 2022).    Exam: L foot drop baseline     (11 Nov 2024 12:00)    PROCEDURE:  s/p lumbar drain placement on 11/11     PLAN:  Neuro:   1 Out of bed   2 continue pt eval today   3 episode of over drainage on 11/11 night - drainage hel till night of 11/12.   4 no positional headache.   5 continue trazadone and zoloft     Respiratory:   1 room air   2 incentive spirometry     CV:  1 HTN - continue on losrtan, hctz and propanolol   2 CAD and s/p CABG - aspirin on hold     Endocrine:   1 Hypothyroidism - continue on po synthroid     Heme/Onc:             DVT ppx: sql and scds   1 stable     Renal:   1 voiding   2 continue flomax   3 hypokalemia- potassium was supplemented     ID:   1 afebrile     GI:   1 ccd diet   2 senna and miralax   3 continue pepcid     Social/Family:   Discharge planning: pending - anticipate dc to home once ld is removed     -35 minutes spent for patient care and all questions were answered.    -will discuss with Dr. Yang   -[employer] [id number]     HPI:  74M PMH Cervical D/F C3-C7 for myelopathy 10/2022, lumbar degen, CAD, NSTEMI, CABG, DM (no peripheral neuropathy), HTN, HLD, Depression,  remote right Bell's palsy, BPH, adm 11/11 NPH trial, cognitive decline x1 yr, nighttime urinary incontinence, gait instability (not there 2022).    Exam: L foot drop baseline     (11 Nov 2024 12:00)    PROCEDURE:  s/p lumbar drain placement on 11/11     PLAN:  Neuro:   1 Out of bed   2 continue pt eval today   3 episode of over drainage on 11/11 night - drainage hel till night of 11/12.   4 no positional headache.   5 continue trazadone and zoloft     Respiratory:   1 room air   2 incentive spirometry     CV:  1 HTN - continue on losrtan, hctz and propanolol   2 CAD and s/p CABG - aspirin on hold     Endocrine:   1 Hypothyroidism - continue on po synthroid   2 DM - a1c 6.1 - On sliding scale   Heme/Onc:             DVT ppx: sql and scds   1 stable     Renal:   1 voiding   2 continue flomax   3 hypokalemia- potassium was supplemented     ID:   1 afebrile     GI:   1 ccd diet   2 senna and miralax   3 continue pepcid     Social/Family:   Discharge planning: pending - anticipate dc to home once ld is removed     -35 minutes spent for patient care and all questions were answered.    -will discuss with Dr. Yang   -[place] [id number]

## 2024-11-14 ENCOUNTER — TRANSCRIPTION ENCOUNTER (OUTPATIENT)
Age: 74
End: 2024-11-14

## 2024-11-14 VITALS
DIASTOLIC BLOOD PRESSURE: 68 MMHG | RESPIRATION RATE: 18 BRPM | TEMPERATURE: 98 F | SYSTOLIC BLOOD PRESSURE: 130 MMHG | OXYGEN SATURATION: 100 % | HEART RATE: 56 BPM

## 2024-11-14 LAB
ANION GAP SERPL CALC-SCNC: 12 MMOL/L — SIGNIFICANT CHANGE UP (ref 5–17)
BUN SERPL-MCNC: 18 MG/DL — SIGNIFICANT CHANGE UP (ref 7–23)
CALCIUM SERPL-MCNC: 9.7 MG/DL — SIGNIFICANT CHANGE UP (ref 8.4–10.5)
CHLORIDE SERPL-SCNC: 102 MMOL/L — SIGNIFICANT CHANGE UP (ref 96–108)
CO2 SERPL-SCNC: 26 MMOL/L — SIGNIFICANT CHANGE UP (ref 22–31)
CREAT SERPL-MCNC: 0.8 MG/DL — SIGNIFICANT CHANGE UP (ref 0.5–1.3)
EGFR: 93 ML/MIN/1.73M2 — SIGNIFICANT CHANGE UP
GLUCOSE BLDC GLUCOMTR-MCNC: 113 MG/DL — HIGH (ref 70–99)
GLUCOSE BLDC GLUCOMTR-MCNC: 138 MG/DL — HIGH (ref 70–99)
GLUCOSE SERPL-MCNC: 99 MG/DL — SIGNIFICANT CHANGE UP (ref 70–99)
POTASSIUM SERPL-MCNC: 3.6 MMOL/L — SIGNIFICANT CHANGE UP (ref 3.5–5.3)
POTASSIUM SERPL-SCNC: 3.6 MMOL/L — SIGNIFICANT CHANGE UP (ref 3.5–5.3)
SODIUM SERPL-SCNC: 140 MMOL/L — SIGNIFICANT CHANGE UP (ref 135–145)

## 2024-11-14 PROCEDURE — 70450 CT HEAD/BRAIN W/O DYE: CPT | Mod: 26

## 2024-11-14 RX ADMIN — LOSARTAN POTASSIUM 100 MILLIGRAM(S): 25 TABLET ORAL at 05:37

## 2024-11-14 RX ADMIN — FINASTERIDE 5 MILLIGRAM(S): 5 TABLET, FILM COATED ORAL at 12:19

## 2024-11-14 RX ADMIN — Medication 120 MILLIGRAM(S): at 05:37

## 2024-11-14 RX ADMIN — SERTRALINE HYDROCHLORIDE 100 MILLIGRAM(S): 50 TABLET, FILM COATED ORAL at 12:17

## 2024-11-14 RX ADMIN — Medication 137 MICROGRAM(S): at 05:37

## 2024-11-14 NOTE — DISCHARGE NOTE NURSING/CASE MANAGEMENT/SOCIAL WORK - NSPROEXTENSIONSOFSELF_GEN_A_NUR
[address] [facility]   [facility]   Phone: [phone]   Fax: [phone]    Physical Therapy Daily Treatment Note      Date:  2022  Patient Name:  Jerry Gimenez    :  1956  MRN: [id number]  Physician: ADRI Reed - CNP // surgeon: Dr. Carla Flores: Orlando Germain - based on [place]  Medical Diagnosis: Z01.818 (ICD-10-CM) - Pre-operative clearance  Rehab Codes: R25 pain , M25.60 stiffness, R53.1 weakness, Z96.652 L TKA   Onset Date: 22- L TKA              Next 's appt: ?  Visit# / total visits:   Cancels/No Shows: 0/0  Date of initial visit: 22        Date of PN: 22 (visit 6)      Precautions: hx of DVT        Subjective:    Patient reprots today that he has been working on L knee motion at home. Noted that pain management continues to be a struggle without meds.         Pain:  [x] Yes  [] No Location: L knee Pain Rating: (0-10 scale) n/a/10  Pain altered Tx:  [x] No  [] Yes  Action:  Comments:    Objective:  INTERVENTIONS  INTERVENTIONS  Reps/ Time Weight/ Level Completed  Today Comments          MODALITIES        vasocompression 10 min Low; 36 deg  Supine with towel behind knee          MANUAL        ISTM - roller stick  5 min    L quad   Patellar mobs- all directions 3 min              EXERCISES        Nustep  5 min Lvl 4  Seat 11 x LE only           Supine        HS stretch with strap 2x30s   x    Gastroc stretch with strap  3x30s      SAQ 2x10 0-1#  Tactile cues only, no assist needed   Inc weight second set     glute bridge  10x2  x Increasing Knee flexion between sets    SLR 2x10   Initially with Yoseph to no assist   Still mild quad lag    Heel slides with OP 15x  x OP from therapist 5\" hold at end range each time      Quad Sets with Towel Roll Under Heel 5x15s  x With therapist over pressure (-3 deg)   hooklying hip abd  2x15 red x           TG DL squat  20x Height 10 x                  Standing TKE 20x red x Cues for posture    Step ups: fwd & lateral 10x2 ea 6 in x L ONLY    4 way hip  20x ea  Red  x                                  Other:  L KNEE ROM  Knee flexion  Knee extension    12/7  72 -20   12/16 102 -8   12/19 103 -3            Specific Instructions for next treatment: nustep, ROM, add manual to increase knee flexion and extension, strengthening as tolerated (try more in standing, work on ambulation        Assessment: [x] Progressing toward goals. Continued to focus on knee ROM and strengthening. Completed bridges in 2 sets of 10, increasing knee flexion between sets to further promote flexion ROM. Increased reps with 4-way hip for additional strengthening and challenge in LLE stability. Less stiffness at end range flexion following heel slides. [] No change. [] Other:    [x] Patient would continue to benefit from skilled physical therapy services in order to: increase ROM, improve strength, control edema, and progress functional mobility to return to near PLOF. GOALS: -assessed 12/19/22  STG: (to be met in 8 treatments)  Pt will self report worst pain no greater than 4/10 in the L knee upon arrival to PT in order to better tolerate ADLs/work activities with minimal dysfunction   12/19: progressing -- still moderate pain   Pt will improve AROM in L knee extension to at least 0 deg to reach terminal stance for ambulation. 12/19: progressing -- -3 deg from full extension   Pt will improved AROM in L knee flexion to >115 deg to be able to sit to low surfaces. 12/19: progressing -- 103 deg knee flexion  Pt will be able to complete sit to stand with BLE with good mechanics to improve ability to transfer throughout the day. 12/19: MET   Pt will have minimal swelling in the LLE showing healing and allowing for ROM to improve.      12/19: progressing   Pt will decrease functional limitation per KOOS JR  to <50% in order to demonstrate improved functional tolerances at PLOF with minimal restriction/dysfunction   12/19: will assess in later session. LTG: (to be met in 16 treatments)   Pt will increase strength of all major muscle groups of the LEs to 5/5 or greater demonstrating improved strength needed to maximize safety and efficiency with mobility tasks such as gait, transfers and stairs. Pt will be able to ambulate without AD with normalized gait mechanics to allow him to return to PLOF   Pt will decrease functional limitation per KOOS JR  to <40% in order to demonstrate improved functional tolerances at PLOF with minimal restriction/dysfunction  Pt will demonstrate independence with a long term HEP for continued progress/maintenance after completion of PT     Pt. Education:  [x] Yes  [] No  [x] Reviewed Prior HEP/Ed  Method of Education: [x] Verbal  [] Demo  [] Written  Comprehension of Education:  [x] Verbalizes understanding. [] Demonstrates understanding. [] Needs review. [x] Demonstrates/verbalizes HEP/Ed previously given. Home exercise Program   EXERCISES        Glute squeezes* 10x3     Quad sets* 10x3 Hold 3s   Supine hipd ABD assisted* 10x 3     Seated heel slide assisted * 10x3 Hold 5s         Plan: [x] Continue per plan of care.    [] Other:        Treatment Charges: Mins Units   []  Modalities     [x]  Ther Exercise 43 3   []  Manual Therapy     []  Ther Activities     []  Aquatics     []  Neuromuscular     [] Vasocompression     [] Gait Training     [] Dry needling        [] 1 or 2 muscles        [] 3 or more muscles     []  Other     Total Treatment time 43 3     Time In: 8:45 am            Time Out: 9:28 am      Electronically signed by:  Rinku Rodas PTA none

## 2024-11-14 NOTE — DISCHARGE NOTE PROVIDER - NSDCMRMEDTOKEN_GEN_ALL_CORE_FT
Acetaminophen Extra Strength Gelcaps 500 m tab(s) orally every 6 hours  Crestor 20 mg oral tablet: 1 tab(s) orally once a day  Jardiance 10 mg oral tablet: 1 tab(s) orally once a day (in the morning) hold 3-4 days before surgery  levothyroxine 137 mcg (0.137 mg) oral tablet: 1 tab(s) orally once a day  Lexapro 20 mg oral tablet: 1 tab(s) orally once a day  losartan-hydrochlorothiazide 100 mg-25 mg oral tablet: 1 tab(s) orally once a day  MetFORMIN (Eqv-Fortamet) 1000 mg oral tablet, extended release: 1 tab(s) orally 2 times a day  methocarbamol 750 mg oral tablet: 1 tab(s) orally every 8 hours, As Needed -for muscle spasm MDD:3   pantoprazole 40 mg oral delayed release tablet: 1 tab(s) orally once a day (before a meal) MDD:1  Pepcid 20 mg oral tablet: 1 tab(s) orally 2 times a day  polyethylene glycol 3350 oral powder for reconstitution: 17 gram(s) orally 2 times a day, As Needed -for constipation MDD:17   propranolol 160 mg oral capsule, extended release: 1 cap(s) orally once a day  Proscar 5 mg oral tablet: 1 tab(s) orally once a day  senna leaf extract oral tablet: 2 tab(s) orally once a day (at bedtime), As Needed -for constipation MDD:2   tamsulosin 0.4 mg oral capsule: 1 cap(s) orally once a day (at bedtime) MDD:1  traZODone 100 mg oral tablet: 1 tab(s) orally once a day (at bedtime), As Needed -for insomnia MDD:1   Valium 2 mg oral tablet: 1 tab(s) orally every 6 hours, As Needed -for muscle spasm MDD:4  Victoza 18 mg/3 mL subcutaneous solution: 1 dose(s) subcutaneous once a day

## 2024-11-14 NOTE — DISCHARGE NOTE PROVIDER - NSDCADMDATE_GEN_ALL_CORE_FT
----- Message from Petrona Mckinley MD sent at 9/11/2018  3:38 PM CDT -----  Please call her. Her stress test was normal. Proceed with the heart monitor (event monitor) that the cardiologist ordered for her palpitations.   11-Nov-2024 10:59

## 2024-11-14 NOTE — PROGRESS NOTE ADULT - SUBJECTIVE AND OBJECTIVE BOX
CLINICAL INDICATION:  Trial of lumbar drain for Normal Pressure Hydrocephalus    PREPROCEDURE:    Patient presents for fluoroscopically guided insertion of lumbar drain.  Risks and benefits were discussed with patient and/or health care proxy.  Risks include but are not limited to headache, bleeding, infection and nerve damage.    Patient and/or health care proxy understands and consents to procedure.    POSTPROCEDURE:    Lumbar puncture was performed at the L2-L3 level using a 14-gauge needle using fluoroscopic guidance. Clear csf flow was noted at hub of needle. Catheter was threaded into the subarachnoid space and was sutured into place by neurosurgery resident at bedside.     Patient tolerated the procedure well and left the department in stable condition.    :    RUDY GUERRA MD  
SUBJECTIVE:   Patient was seen and evaluated at bedside. Patient is resting in bed and is in no new acute distress.   OVERNIGHT EVENTS:   none   Vital Signs Last 24 Hrs  T(C): 36.8 (13 Nov 2024 08:36), Max: 36.8 (12 Nov 2024 16:29)  T(F): 98.2 (13 Nov 2024 08:36), Max: 98.2 (12 Nov 2024 16:29)  HR: 55 (13 Nov 2024 08:36) (48 - 70)  BP: 168/81 (13 Nov 2024 08:36) (107/71 - 170/80)  BP(mean): --  RR: 18 (13 Nov 2024 08:36) (18 - 18)  SpO2: 98% (13 Nov 2024 08:36) (96% - 99%)    Parameters below as of 13 Nov 2024 08:36  Patient On (Oxygen Delivery Method): room air        PHYSICAL EXAM:    General: No Acute Distress     Neurological: Awake, alert oriented to person, place and time, Following Commands, PERRL, EOMI, Face Symmetrical, Speech Fluent, Moving all extremities, Muscle Strength normal in all four extremities, No Drift, Sensation to Light Touch Intact    Pulmonary: Clear to Auscultation, No Rales, No Rhonchi, No Wheezes     Cardiovascular: S1, S2, Regular Rate and Rhythm     Gastrointestinal: Soft, Nontender, Nondistended     Incision: lumbar drain site is stable.     LABS:                        11.7   7.45  )-----------( 124      ( 13 Nov 2024 04:48 )             36.8    11-13    138  |  100  |  20  ----------------------------<  92  3.3[L]   |  26  |  0.77    Ca    9.8      13 Nov 2024 04:48          11-12 @ 07:01  -  11-13 @ 07:00  --------------------------------------------------------  IN: 340 mL / OUT: 420 mL / NET: -80 mL      DRAINS:     MEDICATIONS:  Antibiotics:    Neuro:  acetaminophen   IVPB .. 1000 milliGRAM(s) IV Intermittent every 6 hours PRN Severe Pain (7 - 10)  melatonin 5 milliGRAM(s) Oral at bedtime  oxyCODONE    IR 5 milliGRAM(s) Oral every 4 hours PRN Moderate Pain (4 - 6)  oxyCODONE    IR 10 milliGRAM(s) Oral every 4 hours PRN Severe Pain (7 - 10)  sertraline 100 milliGRAM(s) Oral daily  traZODone 100 milliGRAM(s) Oral at bedtime    Cardiac:  hydrochlorothiazide 25 milliGRAM(s) Oral daily  losartan 100 milliGRAM(s) Oral daily  propranolol  milliGRAM(s) Oral daily    Pulm:    GI/:  bisacodyl 5 milliGRAM(s) Oral daily PRN Constipation  calcium carbonate    500 mG (Tums) Chewable 1 Tablet(s) Chew two times a day PRN Heartburn  famotidine    Tablet 20 milliGRAM(s) Oral every 24 hours  polyethylene glycol 3350 17 Gram(s) Oral two times a day  senna 2 Tablet(s) Oral at bedtime  tamsulosin 0.4 milliGRAM(s) Oral at bedtime    Other:   dextrose 5%. 1000 milliLiter(s) IV Continuous <Continuous>  dextrose 5%. 1000 milliLiter(s) IV Continuous <Continuous>  dextrose 50% Injectable 25 Gram(s) IV Push once  dextrose 50% Injectable 25 Gram(s) IV Push once  dextrose 50% Injectable 12.5 Gram(s) IV Push once  dextrose Oral Gel 15 Gram(s) Oral once PRN Blood Glucose LESS THAN 70 milliGRAM(s)/deciliter  enoxaparin Injectable 40 milliGRAM(s) SubCutaneous <User Schedule>  finasteride 5 milliGRAM(s) Oral daily  glucagon  Injectable 1 milliGRAM(s) IntraMuscular once  insulin lispro (ADMELOG) corrective regimen sliding scale   SubCutaneous at bedtime  insulin lispro (ADMELOG) corrective regimen sliding scale   SubCutaneous three times a day before meals  levothyroxine 137 MICROGram(s) Oral daily  potassium chloride    Tablet ER 20 milliEquivalent(s) Oral every 2 hours  rosuvastatin 20 milliGRAM(s) Oral at bedtime    DIET: [] Regular [] CCD [] Renal [] Puree [] Dysphagia [] Tube Feeds:   ccd diet   IMAGING:     ACC: [id number] EXAM:  CT BRAIN   ORDERED BY: DANA YUEN     PROCEDURE DATE:  11/12/2024          INTERPRETATION:  .    CLINICAL INFORMATION: Headache with CSF drainage. NPH trial.    TECHNIQUE: Multiple axial CT images of the head were obtained without   contrast. Sagittal and coronal reconstructed images were acquired from   the source data.    COMPARISON: Prior brain MRI study dated 9/16/2024. Prior outside CT study   of the head dated 6/27/2024.    FINDINGS: Ventriculomegaly is seen out of proportion to sulcal   prominence. There is crowding of the cerebral sulci at the vertex. The   callosal angle appears sharp at the level of the posterior commissure   measured in the coronal plane.    There is no acute intracranial hemorrhage, mass effect, shift of the   midline structures, or herniation.    The paranasal sinuses and tympanomastoid cavities are clear. The   calvarium appears intact. There is evidence of bilateral cataract removal.    IMPRESSION: Unchanged findings for which normalpressure or communicating   hydrocephalus can be considered. Clinical correlation is acquired for   this diagnosis.    No acute intracranial findings.    --- End of Report ---            GWEN SANTACRUZ MD; Attending Radiologist  This document has been electronically signed. Nov 12 2024  2:31PM  
Patient was seen at bedside this am. Patient c/o 7/10 headache. Otherwise, denied any dizziness, cp, sob, n/v, or abd pain.     OVERNIGHT EVENTS:  - one episode of ?overdrainage last night. drain was open bag for total of 5mins. drain was clamped afterwards. Patient was lay flat for 1 hour after this episode.     Vital Signs Last 24 Hrs  T(C): 36.6 (12 Nov 2024 09:07), Max: 37.2 (11 Nov 2024 23:59)  T(F): 97.8 (12 Nov 2024 09:07), Max: 98.9 (11 Nov 2024 23:59)  HR: 53 (12 Nov 2024 10:43) (50 - 68)  BP: 135/70 (12 Nov 2024 09:07) (121/86 - 154/74)  BP(mean): 91 (12 Nov 2024 09:07) (79 - 91)  RR: 18 (12 Nov 2024 09:07) (12 - 18)  SpO2: 97% (12 Nov 2024 10:43) (94% - 100%)    Parameters below as of 12 Nov 2024 10:43  Patient On (Oxygen Delivery Method): room air        I&O's Detail    11 Nov 2024 07:01  -  12 Nov 2024 07:00  --------------------------------------------------------  IN:  Total IN: 0 mL    OUT:    Drain (mL): 60 mL    Voided (mL): 800 mL  Total OUT: 860 mL    Total NET: -860 mL      12 Nov 2024 07:01  -  12 Nov 2024 11:36  --------------------------------------------------------  IN:  Total IN: 0 mL    OUT:    Drain (mL): 20 mL  Total OUT: 20 mL    Total NET: -20 mL        I&O's Summary    11 Nov 2024 07:01  -  12 Nov 2024 07:00  --------------------------------------------------------  IN: 0 mL / OUT: 860 mL / NET: -860 mL    12 Nov 2024 07:01  -  12 Nov 2024 11:36  --------------------------------------------------------  IN: 0 mL / OUT: 20 mL / NET: -20 mL        PHYSICAL EXAM:  Neurological:  awake, alert, oriented to person, place, and date, residual facial asymmetry from remote history of Rt bell's palsy, EOM intact, speech clear and fluent, following commands, moving all extremities 5/5, sensation intact to light touch  Cardiovascular: +s1, s2  Respiratory: clear to auscultation b/l  Gastrointestinal: soft, non-distended, non-tender  Genitourinary: voiding  Extremities: warm, dry  Incision/Wound: LD drain site dressing dry and intact, old spot of blood on the dressing, no active bleeding or drainage noted        LABS:                  CAPILLARY BLOOD GLUCOSE      POCT Blood Glucose.: 178 mg/dL (12 Nov 2024 11:10)  POCT Blood Glucose.: 92 mg/dL (12 Nov 2024 07:29)  POCT Blood Glucose.: 116 mg/dL (11 Nov 2024 21:03)  POCT Blood Glucose.: 89 mg/dL (11 Nov 2024 16:30)  POCT Blood Glucose.: 92 mg/dL (11 Nov 2024 15:18)      Drug Levels: [] N/A    CSF Analysis: [] N/A      Allergies    No Known Allergies    Intolerances    ACE inhibitors (Other)  levofloxacin (Joint Pain)    MEDICATIONS:  Antibiotics:    Neuro:  acetaminophen   IVPB .. 1000 milliGRAM(s) IV Intermittent every 6 hours PRN  melatonin 5 milliGRAM(s) Oral at bedtime  oxyCODONE    IR 10 milliGRAM(s) Oral every 4 hours PRN  oxyCODONE    IR 5 milliGRAM(s) Oral every 4 hours PRN  sertraline 100 milliGRAM(s) Oral daily  traZODone 100 milliGRAM(s) Oral at bedtime    Anticoagulation:    OTHER:  bisacodyl 5 milliGRAM(s) Oral daily PRN  dextrose 50% Injectable 25 Gram(s) IV Push once  dextrose 50% Injectable 25 Gram(s) IV Push once  dextrose 50% Injectable 12.5 Gram(s) IV Push once  dextrose Oral Gel 15 Gram(s) Oral once PRN  famotidine    Tablet 20 milliGRAM(s) Oral every 24 hours  finasteride 5 milliGRAM(s) Oral daily  glucagon  Injectable 1 milliGRAM(s) IntraMuscular once  hydrochlorothiazide 25 milliGRAM(s) Oral daily  insulin lispro (ADMELOG) corrective regimen sliding scale   SubCutaneous three times a day before meals  insulin lispro (ADMELOG) corrective regimen sliding scale   SubCutaneous at bedtime  levothyroxine 137 MICROGram(s) Oral daily  losartan 100 milliGRAM(s) Oral daily  polyethylene glycol 3350 17 Gram(s) Oral two times a day  propranolol  milliGRAM(s) Oral daily  rosuvastatin 20 milliGRAM(s) Oral at bedtime  senna 2 Tablet(s) Oral at bedtime  tamsulosin 0.4 milliGRAM(s) Oral at bedtime    IVF:  dextrose 5%. 1000 milliLiter(s) IV Continuous <Continuous>  dextrose 5%. 1000 milliLiter(s) IV Continuous <Continuous>    CULTURES:    RADIOLOGY & ADDITIONAL TESTS:      
SUBJECTIVE:   Patient was seen and evaluated at bedside. Patient is resting in bed and is in no new acute distress.   OVERNIGHT EVENTS:   none   Vital Signs Last 24 Hrs  T(C): 36.7 (14 Nov 2024 04:41), Max: 37.1 (13 Nov 2024 21:15)  T(F): 98 (14 Nov 2024 04:41), Max: 98.8 (13 Nov 2024 21:15)  HR: 56 (14 Nov 2024 04:41) (51 - 70)  BP: 141/70 (14 Nov 2024 04:41) (116/67 - 170/80)  BP(mean): --  RR: 18 (14 Nov 2024 04:41) (18 - 18)  SpO2: 96% (14 Nov 2024 04:41) (96% - 99%)    Parameters below as of 14 Nov 2024 04:41      Patient On (Oxygen Delivery Method): room air      PHYSICAL EXAM:    General: No Acute Distress     Neurological: Awake, alert oriented to person, place and time, Following Commands, PERRL, EOMI, Face Symmetrical, Speech Fluent, Moving all extremities, Muscle Strength normal in all four extremities, No Drift, Sensation to Light Touch Intact    Pulmonary: Clear to Auscultation, No Rales, No Rhonchi, No Wheezes     Cardiovascular: S1, S2, Regular Rate and Rhythm     Gastrointestinal: Soft, Nontender, Nondistended     Incision: lumbar drain site is stable.     LABS:                                 11.7   7.45  )-----------( 124      ( 13 Nov 2024 04:48 )             36.8   11-14    140  |  102  |  18  ----------------------------<  99  3.6   |  26  |  0.80    Ca    9.7      14 Nov 2024 06:36          11-12 @ 07:01  -  11-13 @ 07:00  --------------------------------------------------------  IN: 340 mL / OUT: 420 mL / NET: -80 mL      DRAINS:     MEDICATIONS:  Antibiotics:    Neuro:  acetaminophen   IVPB .. 1000 milliGRAM(s) IV Intermittent every 6 hours PRN Severe Pain (7 - 10)  melatonin 5 milliGRAM(s) Oral at bedtime  oxyCODONE    IR 5 milliGRAM(s) Oral every 4 hours PRN Moderate Pain (4 - 6)  oxyCODONE    IR 10 milliGRAM(s) Oral every 4 hours PRN Severe Pain (7 - 10)  sertraline 100 milliGRAM(s) Oral daily  traZODone 100 milliGRAM(s) Oral at bedtime    Cardiac:  hydrochlorothiazide 25 milliGRAM(s) Oral daily  losartan 100 milliGRAM(s) Oral daily  propranolol  milliGRAM(s) Oral daily    Pulm:    GI/:  bisacodyl 5 milliGRAM(s) Oral daily PRN Constipation  calcium carbonate    500 mG (Tums) Chewable 1 Tablet(s) Chew two times a day PRN Heartburn  famotidine    Tablet 20 milliGRAM(s) Oral every 24 hours  polyethylene glycol 3350 17 Gram(s) Oral two times a day  senna 2 Tablet(s) Oral at bedtime  tamsulosin 0.4 milliGRAM(s) Oral at bedtime    Other:   dextrose 5%. 1000 milliLiter(s) IV Continuous <Continuous>  dextrose 5%. 1000 milliLiter(s) IV Continuous <Continuous>  dextrose 50% Injectable 25 Gram(s) IV Push once  dextrose 50% Injectable 25 Gram(s) IV Push once  dextrose 50% Injectable 12.5 Gram(s) IV Push once  dextrose Oral Gel 15 Gram(s) Oral once PRN Blood Glucose LESS THAN 70 milliGRAM(s)/deciliter  enoxaparin Injectable 40 milliGRAM(s) SubCutaneous <User Schedule>  finasteride 5 milliGRAM(s) Oral daily  glucagon  Injectable 1 milliGRAM(s) IntraMuscular once  insulin lispro (ADMELOG) corrective regimen sliding scale   SubCutaneous at bedtime  insulin lispro (ADMELOG) corrective regimen sliding scale   SubCutaneous three times a day before meals  levothyroxine 137 MICROGram(s) Oral daily  potassium chloride    Tablet ER 20 milliEquivalent(s) Oral every 2 hours  rosuvastatin 20 milliGRAM(s) Oral at bedtime    DIET: [] Regular [] CCD [] Renal [] Puree [] Dysphagia [] Tube Feeds:   ccd diet   IMAGING:     ACC: [id number] EXAM:  CT BRAIN   ORDERED BY: DANA YUEN     PROCEDURE DATE:  11/12/2024          INTERPRETATION:  .    CLINICAL INFORMATION: Headache with CSF drainage. NPH trial.    TECHNIQUE: Multiple axial CT images of the head were obtained without   contrast. Sagittal and coronal reconstructed images were acquired from   the source data.    COMPARISON: Prior brain MRI study dated 9/16/2024. Prior outside CT study   of the head dated 6/27/2024.    FINDINGS: Ventriculomegaly is seen out of proportion to sulcal   prominence. There is crowding of the cerebral sulci at the vertex. The   callosal angle appears sharp at the level of the posterior commissure   measured in the coronal plane.    There is no acute intracranial hemorrhage, mass effect, shift of the   midline structures, or herniation.    The paranasal sinuses and tympanomastoid cavities are clear. The   calvarium appears intact. There is evidence of bilateral cataract removal.    IMPRESSION: Unchanged findings for which normalpressure or communicating   hydrocephalus can be considered. Clinical correlation is acquired for   this diagnosis.    No acute intracranial findings.    --- End of Report ---            GWEN SANTACRUZ MD; Attending Radiologist  This document has been electronically signed. Nov 12 2024  2:31PM

## 2024-11-14 NOTE — PROGRESS NOTE ADULT - ASSESSMENT
HPI:  74M PMH Cervical D/F C3-C7 for myelopathy 10/2022, lumbar degen, CAD, NSTEMI, CABG, DM (no peripheral neuropathy), HTN, HLD, Depression,  remote right Bell's palsy, BPH, adm 11/11 NPH trial, cognitive decline x1 yr, nighttime urinary incontinence, gait instability (not there 2022).    Exam: L foot drop baseline     (11 Nov 2024 12:00)    PROCEDURE:  s/p lumbar drain placement on 11/11     PLAN:  Neuro:   -drain removed overnight no leaks  -stereo ct pre-shunt now  -final post drainage PT eval prior to discharge today  - continue trazadone and zoloft     Respiratory:   1 room air   2 incentive spirometry     CV:  1 HTN - continue on losrtan, hctz and propanolol   2 CAD and s/p CABG - aspirin on hold, resume when ok per surgeon now drain out    Endocrine:   1 Hypothyroidism - continue on po synthroid   2 DM - a1c 6.1 - On sliding scale   Heme/Onc:             DVT ppx: sql and scds   1 stable     Renal:   1 voiding   2 continue flomax   3 hypokalemia- potassium was supplemented 11/13    ID:   1 afebrile     GI:   1 ccd diet   2 senna and miralax   3 continue pepcid     Social/Family:   Discharge planning: home later     -35 minutes spent for patient care and all questions were answered.    -will discuss with Dr. Yang   -[place] [phone]

## 2024-11-14 NOTE — DISCHARGE NOTE PROVIDER - CARE PROVIDER_API CALL
Tyler Yang (MD)  Neurosurgery  [address], [address]  [place] [address]  Phone: [phone]  Fax: [phone]  Follow Up Time:

## 2024-11-14 NOTE — DISCHARGE NOTE NURSING/CASE MANAGEMENT/SOCIAL WORK - PATIENT PORTAL LINK FT
You can access the FollowMyHealth Patient Portal offered by [facility] by registering at the following website: [email or web address]. By joining [employer]’s FollowMyHealth portal, you will also be able to view your health information using other applications (apps) compatible with our system.

## 2024-11-14 NOTE — DISCHARGE NOTE NURSING/CASE MANAGEMENT/SOCIAL WORK - FINANCIAL ASSISTANCE
[facility] provides services at a reduced cost to those who are determined to be eligible through [facility]’s financial assistance program. Information regarding [facility]’s financial assistance program can be found by going to [email or web address] or by calling [phone].

## 2024-11-14 NOTE — DISCHARGE NOTE PROVIDER - HOSPITAL COURSE
74M PMH Cervical D/F C3-C7 for myelopathy 10/2022, lumbar degen, CAD, NSTEMI, CABG, DM (no peripheral neuropathy), HTN, HLD, Depression,  remote right Bell's palsy, BPH, adm 11/11 NPH trial, cognitive decline x1 yr, nighttime urinary incontinence, gait instability (not there 2022).    74M adm. 11/11 for LD trial for NPH.  tolerated drainage process well.  11/14 3am drain removed with dissolvable suture placed by resident.  PT reports very slight improvement post drainage process.  11/14 stereo head ct stably large vents no bleed.    11/14 medically stable for dc home to follow up in office in 1-2 weeks with surgeon.  resumption of ASA use at discretion of surgeon.

## 2024-11-14 NOTE — PROGRESS NOTE ADULT - REASON FOR ADMISSION
Patient was admitted for NPH trial. h/o cognitive decline, urinary incontinence and gait instability.
Patient was admitted for NPH trial. h/o cognitive decline, urinary incontinence and gait instability.

## 2024-11-19 DIAGNOSIS — Z79.01 LONG TERM (CURRENT) USE OF ANTICOAGULANTS: ICD-10-CM

## 2024-11-26 PROCEDURE — 80048 BASIC METABOLIC PNL TOTAL CA: CPT

## 2024-11-26 PROCEDURE — 97116 GAIT TRAINING THERAPY: CPT

## 2024-11-26 PROCEDURE — 97129 THER IVNTJ 1ST 15 MIN: CPT

## 2024-11-26 PROCEDURE — 70450 CT HEAD/BRAIN W/O DYE: CPT | Mod: MC

## 2024-11-26 PROCEDURE — 83036 HEMOGLOBIN GLYCOSYLATED A1C: CPT

## 2024-11-26 PROCEDURE — 97110 THERAPEUTIC EXERCISES: CPT

## 2024-11-26 PROCEDURE — 85027 COMPLETE CBC AUTOMATED: CPT

## 2024-11-26 PROCEDURE — 97165 OT EVAL LOW COMPLEX 30 MIN: CPT

## 2024-11-26 PROCEDURE — 97161 PT EVAL LOW COMPLEX 20 MIN: CPT

## 2024-11-26 PROCEDURE — 62329 THER SPI PNXR CSF FLUOR/CT: CPT

## 2024-11-26 PROCEDURE — 82962 GLUCOSE BLOOD TEST: CPT

## 2024-11-26 PROCEDURE — 97530 THERAPEUTIC ACTIVITIES: CPT

## 2024-11-27 ENCOUNTER — APPOINTMENT (OUTPATIENT)
Dept: SPINE | Facility: CLINIC | Age: 74
End: 2024-11-27
Payer: MEDICARE

## 2024-11-27 VITALS
SYSTOLIC BLOOD PRESSURE: 157 MMHG | OXYGEN SATURATION: 96 % | HEIGHT: 65 IN | HEART RATE: 67 BPM | DIASTOLIC BLOOD PRESSURE: 83 MMHG | BODY MASS INDEX: 23.99 KG/M2 | WEIGHT: 144 LBS

## 2024-11-27 DIAGNOSIS — G91.2 (IDIOPATHIC) NORMAL PRESSURE HYDROCEPHALUS: ICD-10-CM

## 2024-11-27 DIAGNOSIS — R26.9 UNSPECIFIED ABNORMALITIES OF GAIT AND MOBILITY: ICD-10-CM

## 2024-11-27 PROCEDURE — 99212 OFFICE O/P EST SF 10 MIN: CPT

## 2024-12-16 ENCOUNTER — APPOINTMENT (OUTPATIENT)
Dept: ORTHOPEDIC SURGERY | Facility: CLINIC | Age: 74
End: 2024-12-16
Payer: MEDICARE

## 2024-12-16 VITALS — WEIGHT: 144 LBS | HEIGHT: 65 IN | BODY MASS INDEX: 23.99 KG/M2

## 2024-12-16 DIAGNOSIS — S39.012A STRAIN OF MUSCLE, FASCIA AND TENDON OF LOWER BACK, INITIAL ENCOUNTER: ICD-10-CM

## 2024-12-16 DIAGNOSIS — M47.817 SPONDYLOSIS W/OUT MYELOPATHY OR RADICULOPATHY, LUMBOSACRAL REGION: ICD-10-CM

## 2024-12-16 DIAGNOSIS — M48.07 SPINAL STENOSIS, LUMBOSACRAL REGION: ICD-10-CM

## 2024-12-16 PROCEDURE — 72100 X-RAY EXAM L-S SPINE 2/3 VWS: CPT

## 2024-12-16 PROCEDURE — 73502 X-RAY EXAM HIP UNI 2-3 VIEWS: CPT

## 2024-12-16 PROCEDURE — 99203 OFFICE O/P NEW LOW 30 MIN: CPT

## 2024-12-24 ENCOUNTER — APPOINTMENT (OUTPATIENT)
Dept: ORTHOPEDIC SURGERY | Facility: CLINIC | Age: 74
End: 2024-12-24

## 2025-02-01 ENCOUNTER — NON-APPOINTMENT (OUTPATIENT)
Age: 75
End: 2025-02-01

## 2025-03-11 ENCOUNTER — NON-APPOINTMENT (OUTPATIENT)
Age: 75
End: 2025-03-11

## 2025-03-11 ENCOUNTER — APPOINTMENT (OUTPATIENT)
Dept: OPHTHALMOLOGY | Facility: CLINIC | Age: 75
End: 2025-03-11
Payer: MEDICARE

## 2025-03-11 PROCEDURE — 92014 COMPRE OPH EXAM EST PT 1/>: CPT

## 2025-03-11 PROCEDURE — 92133 CPTRZD OPH DX IMG PST SGM ON: CPT

## 2025-04-17 ENCOUNTER — RX RENEWAL (OUTPATIENT)
Age: 75
End: 2025-04-17

## 2025-06-28 ENCOUNTER — APPOINTMENT (OUTPATIENT)
Dept: ULTRASOUND IMAGING | Facility: CLINIC | Age: 75
End: 2025-06-28

## 2025-06-28 ENCOUNTER — OUTPATIENT (OUTPATIENT)
Dept: OUTPATIENT SERVICES | Facility: HOSPITAL | Age: 75
LOS: 1 days | End: 2025-06-28
Payer: MEDICARE

## 2025-06-28 DIAGNOSIS — Z90.49 ACQUIRED ABSENCE OF OTHER SPECIFIED PARTS OF DIGESTIVE TRACT: Chronic | ICD-10-CM

## 2025-06-28 DIAGNOSIS — Z98.84 BARIATRIC SURGERY STATUS: Chronic | ICD-10-CM

## 2025-06-28 DIAGNOSIS — Z98.61 CORONARY ANGIOPLASTY STATUS: Chronic | ICD-10-CM

## 2025-06-28 DIAGNOSIS — E89.0 POSTPROCEDURAL HYPOTHYROIDISM: Chronic | ICD-10-CM

## 2025-06-28 DIAGNOSIS — Z98.890 OTHER SPECIFIED POSTPROCEDURAL STATES: ICD-10-CM

## 2025-06-28 DIAGNOSIS — Z95.1 PRESENCE OF AORTOCORONARY BYPASS GRAFT: Chronic | ICD-10-CM

## 2025-06-28 DIAGNOSIS — Z98.890 OTHER SPECIFIED POSTPROCEDURAL STATES: Chronic | ICD-10-CM

## 2025-06-28 DIAGNOSIS — I73.9 PERIPHERAL VASCULAR DISEASE, UNSPECIFIED: ICD-10-CM

## 2025-06-28 PROCEDURE — 93925 LOWER EXTREMITY STUDY: CPT | Mod: 26

## 2025-06-28 PROCEDURE — 93925 LOWER EXTREMITY STUDY: CPT

## 2025-07-08 ENCOUNTER — NON-APPOINTMENT (OUTPATIENT)
Age: 75
End: 2025-07-08

## 2025-07-09 ENCOUNTER — NON-APPOINTMENT (OUTPATIENT)
Age: 75
End: 2025-07-09

## 2025-07-10 ENCOUNTER — NON-APPOINTMENT (OUTPATIENT)
Age: 75
End: 2025-07-10

## 2025-07-10 ENCOUNTER — APPOINTMENT (OUTPATIENT)
Dept: OPHTHALMOLOGY | Facility: CLINIC | Age: 75
End: 2025-07-10
Payer: MEDICARE

## 2025-07-10 PROCEDURE — 92014 COMPRE OPH EXAM EST PT 1/>: CPT

## 2025-07-10 PROCEDURE — 92134 CPTRZ OPH DX IMG PST SGM RTA: CPT

## 2025-08-19 ENCOUNTER — APPOINTMENT (OUTPATIENT)
Dept: ELECTROPHYSIOLOGY | Facility: CLINIC | Age: 75
End: 2025-08-19
Payer: MEDICARE

## 2025-08-19 VITALS — DIASTOLIC BLOOD PRESSURE: 88 MMHG | OXYGEN SATURATION: 98 % | HEART RATE: 89 BPM | SYSTOLIC BLOOD PRESSURE: 161 MMHG

## 2025-08-19 DIAGNOSIS — I48.0 PAROXYSMAL ATRIAL FIBRILLATION: ICD-10-CM

## 2025-08-19 PROCEDURE — 99205 OFFICE O/P NEW HI 60 MIN: CPT

## 2025-08-19 PROCEDURE — 93000 ELECTROCARDIOGRAM COMPLETE: CPT

## 2025-08-19 RX ORDER — SACUBITRIL AND VALSARTAN 24; 26 MG/1; MG/1
24-26 TABLET, FILM COATED ORAL TWICE DAILY
Qty: 60 | Refills: 6 | Status: ACTIVE | COMMUNITY
Start: 2025-08-19

## 2025-08-19 RX ORDER — APIXABAN 5 MG/1
5 TABLET, FILM COATED ORAL
Qty: 90 | Refills: 2 | Status: ACTIVE | COMMUNITY
Start: 2025-08-19

## 2025-08-19 RX ORDER — CILOSTAZOL 100 MG/1
100 TABLET ORAL TWICE DAILY
Refills: 0 | Status: ACTIVE | COMMUNITY
Start: 2025-08-19

## 2025-08-19 RX ORDER — CARBIDOPA AND LEVODOPA 25; 100 MG/1; MG/1
25-100 TABLET ORAL 3 TIMES DAILY
Refills: 0 | Status: ACTIVE | COMMUNITY
Start: 2025-08-19

## (undated) DEVICE — DRSG MOLNLYCKE MEPILEX BORDER AG 4X4

## (undated) DEVICE — SWITCH PERF ARISS TABLE

## (undated) DEVICE — DRSG TEGADERM 4X4.75"

## (undated) DEVICE — SPONGE RAYTEC 4X4 16PLY

## (undated) DEVICE — SPONGE LAP X-RAY DETECTABLE 18X18"

## (undated) DEVICE — DRAIN DRAINAGE SET CHEST DRY ADL

## (undated) DEVICE — SUT PROLENE 5-0 36" RB-1

## (undated) DEVICE — SUT VICRYL 2-0 27" CT-1 UNDYED

## (undated) DEVICE — TOURNIQUET KIT TOURNIKWIK STYLE

## (undated) DEVICE — SPONGE PEANUT AUTO COUNT

## (undated) DEVICE — NDL HYPO SAFE 18G X 1.5"

## (undated) DEVICE — GLV 8 PROTEXIS

## (undated) DEVICE — NEEDLE COUNTER  FOAM AND MAGNET 40-70

## (undated) DEVICE — TUBING SUCTION 20FT

## (undated) DEVICE — DRAPE TOWEL BLUE 17" X 24"

## (undated) DEVICE — SUT PROLENE 7-0 24" BV175-6

## (undated) DEVICE — DRAPE IOBAN 23X33"

## (undated) DEVICE — SUT PROLENE 7-0 30" BV-1

## (undated) DEVICE — SOL ANTI FOG

## (undated) DEVICE — PRESSURE INFUSOR BAG 1000ML

## (undated) DEVICE — MEDICATION LABELS W MARKER

## (undated) DEVICE — GLV 7.5 PROTEXIS

## (undated) DEVICE — GLV 7.5 SENSICARE W ALOE

## (undated) DEVICE — DRSG KLING 4"

## (undated) DEVICE — ELCTR BOVIE HANDHELD PENCIL ROCKER SWITCH 15FT

## (undated) DEVICE — GLV 6.5 PROTEXIS

## (undated) DEVICE — GLV 6.5 ESTEEM BLUE

## (undated) DEVICE — SUT PROLENE 4-0 36" SH

## (undated) DEVICE — SUT BLUNT SZ 5

## (undated) DEVICE — SAW BLADE STRYKER SAGITTAL SAFEDGE 40.5X47.5X0.64

## (undated) DEVICE — PUN AOR 4MM

## (undated) DEVICE — PREP SCRUB BRUSH W CHG 4%

## (undated) DEVICE — DRSG SAFETAC FOAM MEPILEX 4X12"

## (undated) DEVICE — SUT PROLENE 6-0 30" C-1

## (undated) DEVICE — SUT DOUBLE 6 WIRE STERNAL

## (undated) DEVICE — BLANKET HYPER/HYPO ADULT CT/10

## (undated) DEVICE — ELCTR MULTIFUNCTION DEFIBRILLATION ELECTRODE EDGE SYSTEM ADULT

## (undated) DEVICE — SUT PROLENE 4-0 36" RB-1

## (undated) DEVICE — Device

## (undated) DEVICE — SET BLOOD Y-TYPE

## (undated) DEVICE — DRSG OPSITE POSTOP 2.5"X2"

## (undated) DEVICE — GLV 6 PROTEXIS

## (undated) DEVICE — CLIP SOFTJAW DBL 6MM

## (undated) DEVICE — GLV 7 ESTEEM BLUE

## (undated) DEVICE — GLV 7 PROTEXIS

## (undated) DEVICE — SUT PERMAHAND SILK 2 60" TIES

## (undated) DEVICE — SUT PROLENE 3-0 36" SH

## (undated) DEVICE — KIT SUCT MAXIFLO STRT CATH 14FR

## (undated) DEVICE — PUN AOR STD 5MM

## (undated) DEVICE — PAD PHRENIC NERVE MED

## (undated) DEVICE — LIGATING CLIPS AESCULAP MEDIUM BLUE 24

## (undated) DEVICE — SUT STAINLESS STEEL 5 18" SCC

## (undated) DEVICE — BLADE SURGICAL #15 CARBON

## (undated) DEVICE — SUT VICRYL 2-0 27" CT-2 UNDYED

## (undated) DEVICE — GOWN TRIMAX LG

## (undated) DEVICE — SUT ETHIBOND 5 4-30" CCS

## (undated) DEVICE — DRSG OPSITE POSTOP 13.75"X4"

## (undated) DEVICE — SUT PROLENE 3-0 36" MH

## (undated) DEVICE — SENSOR MYOCARDIAL TEMP 15MM

## (undated) DEVICE — PRESSURE INFUSER BAG 500ML DISP

## (undated) DEVICE — TONGUE DEPRESSOR

## (undated) DEVICE — STOPCOCK 3 WAY W SWIVEL MALE LUER LOCK

## (undated) DEVICE — SUT OCTOBASE HOLDING INSERT

## (undated) DEVICE — SUT PDS II 2-0 27" CT-1

## (undated) DEVICE — VESSEL LOOP ASPEN MAXI RED

## (undated) DEVICE — PLEDGET POLYMER 9.5X4.8MM

## (undated) DEVICE — SYR LUER LOK 20CC

## (undated) DEVICE — BLADE MINI SHARP ALL ROUND

## (undated) DEVICE — SUT SILK 0 30" TIES

## (undated) DEVICE — POSITIONER CARDIAC BUMP

## (undated) DEVICE — VASOVIEW HARVESTING SYS 7 XB

## (undated) DEVICE — TUBING MEDI-VAC W MAXIGRIP CONNECTORS 1/4"X6'

## (undated) DEVICE — SUT MONOCRYL 4-0 27" PS-2 UNDYED

## (undated) DEVICE — SOL INJ NS 0.9% 1000ML

## (undated) DEVICE — CABLE PACE BI-V TEMP ALLIGA  DISP 12FT

## (undated) DEVICE — SUT VICRYL 0 36" CTX UNDYED

## (undated) DEVICE — DRSG 4X4

## (undated) DEVICE — CONN REDUCR 3/8 X 1/2

## (undated) DEVICE — TUBING ALARIS PUMP MODULE NON-DEHP

## (undated) DEVICE — CLAMP SOFT FIBRA INSERT

## (undated) DEVICE — TUBING TRUWAVE PRESSURE MALE/FEMALE 72"

## (undated) DEVICE — SUT SILK 2-0 8-18" SH

## (undated) DEVICE — PERF ST MULT CLR CODE 38CM

## (undated) DEVICE — GOWN XL W TOWEL

## (undated) DEVICE — SOL IRR POUR NS 0.9% 500ML

## (undated) DEVICE — GLV 8.5 PROTEXIS

## (undated) DEVICE — DRSG TAPE TRANSPORE 3"

## (undated) DEVICE — PREP CHLORAPREP ORANGE 2PCT 26ML

## (undated) DEVICE — MARKER SKIN MULTI TIP 6"

## (undated) DEVICE — DRAPE SLUSH MACHINE 44X66"

## (undated) DEVICE — RANGER BLOOD/FLUID WARMING SET DISP

## (undated) DEVICE — VASCULAR PROBES

## (undated) DEVICE — POSITIONER HEART STABILZR URCHIN EVO

## (undated) DEVICE — STAPLER SKIN PROXIMATE

## (undated) DEVICE — LIGATING CLIPS AESCULAP LARGE 6

## (undated) DEVICE — MEDTRONIC CLEARVIEW BLOWER MISTER KIT W TUBING SET

## (undated) DEVICE — SUT VICRYL 2 27" TP-1 UNDYED

## (undated) DEVICE — SUT PROLENE 5-0 30" RB-2

## (undated) DEVICE — DRAPE TOWEL WHITE 17" X 24"

## (undated) DEVICE — SUT SILK 0 30" SH

## (undated) DEVICE — NDL BLUNT 18G LOOP VESSEL MAXI WHITE

## (undated) DEVICE — CATH IV SAFE BC YEL 24GAX0.75"

## (undated) DEVICE — DRSG WRAP ORTHO LF 6X5"